# Patient Record
Sex: FEMALE | Race: WHITE | NOT HISPANIC OR LATINO | Employment: OTHER | URBAN - METROPOLITAN AREA
[De-identification: names, ages, dates, MRNs, and addresses within clinical notes are randomized per-mention and may not be internally consistent; named-entity substitution may affect disease eponyms.]

---

## 2017-02-21 ENCOUNTER — HOSPITAL ENCOUNTER (EMERGENCY)
Facility: HOSPITAL | Age: 32
Discharge: HOME | End: 2017-02-21
Payer: COMMERCIAL

## 2017-02-21 VITALS
BODY MASS INDEX: 29.45 KG/M2 | TEMPERATURE: 98.6 F | SYSTOLIC BLOOD PRESSURE: 127 MMHG | RESPIRATION RATE: 18 BRPM | DIASTOLIC BLOOD PRESSURE: 78 MMHG | HEART RATE: 73 BPM | OXYGEN SATURATION: 100 % | WEIGHT: 150 LBS | HEIGHT: 60 IN
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
## 2017-02-21 DIAGNOSIS — T25.021A: ICD-10-CM

## 2017-02-21 PROCEDURE — 99283 EMERGENCY DEPT VISIT LOW MDM: CPT

## 2017-02-21 RX ORDER — OXYCODONE HYDROCHLORIDE AND ACETAMINOPHEN 5; 325 MG/1; MG/1
1 TABLET ORAL EVERY 4 HOURS PRN
Qty: 15 TABLET | Refills: 0 | Status: SHIPPED | OUTPATIENT
Start: 2017-02-21 | End: 2017-03-03

## 2017-09-23 ENCOUNTER — HOSPITAL ENCOUNTER (EMERGENCY)
Facility: HOSPITAL | Age: 32
Discharge: HOME | End: 2017-09-23
Attending: EMERGENCY MEDICINE | Admitting: EMERGENCY MEDICINE
Payer: COMMERCIAL

## 2017-09-23 ENCOUNTER — APPOINTMENT (EMERGENCY)
Dept: ULTRASOUND IMAGING | Facility: HOSPITAL | Age: 32
End: 2017-09-23
Payer: COMMERCIAL

## 2017-09-23 VITALS
WEIGHT: 150 LBS | OXYGEN SATURATION: 99 % | HEART RATE: 75 BPM | HEIGHT: 60 IN | RESPIRATION RATE: 18 BRPM | TEMPERATURE: 97 F | BODY MASS INDEX: 29.45 KG/M2 | DIASTOLIC BLOOD PRESSURE: 67 MMHG | SYSTOLIC BLOOD PRESSURE: 112 MMHG

## 2017-09-23 DIAGNOSIS — R19.7 NAUSEA VOMITING AND DIARRHEA: ICD-10-CM

## 2017-09-23 DIAGNOSIS — R11.2 NAUSEA VOMITING AND DIARRHEA: ICD-10-CM

## 2017-09-23 DIAGNOSIS — R10.9 ABDOMINAL PAIN: Primary | ICD-10-CM

## 2017-09-23 LAB
ALBUMIN SERPL BCP-MCNC: 3.9 G/DL (ref 3.5–5)
ALP SERPL-CCNC: 86 U/L (ref 46–116)
ALT SERPL W P-5'-P-CCNC: 24 U/L (ref 12–78)
AMYLASE SERPL-CCNC: 38 IU/L (ref 25–115)
ANION GAP SERPL CALCULATED.3IONS-SCNC: 12 MMOL/L (ref 4–13)
AST SERPL W P-5'-P-CCNC: 12 U/L (ref 5–45)
BACTERIA UR QL AUTO: ABNORMAL /HPF
BASOPHILS # BLD AUTO: 0.02 THOUSANDS/ΜL (ref 0–0.1)
BASOPHILS NFR BLD AUTO: 0 % (ref 0–1)
BILIRUB DIRECT SERPL-MCNC: 0.16 MG/DL (ref 0–0.2)
BILIRUB SERPL-MCNC: 0.6 MG/DL (ref 0.2–1)
BILIRUB UR QL STRIP: NEGATIVE
BUN SERPL-MCNC: 11 MG/DL (ref 5–25)
CALCIUM SERPL-MCNC: 8.4 MG/DL (ref 8.3–10.1)
CHLORIDE SERPL-SCNC: 102 MMOL/L (ref 100–108)
CLARITY UR: CLEAR
CO2 SERPL-SCNC: 25 MMOL/L (ref 21–32)
COLOR UR: YELLOW
CREAT SERPL-MCNC: 0.77 MG/DL (ref 0.6–1.3)
EOSINOPHIL # BLD AUTO: 0.02 THOUSAND/ΜL (ref 0–0.61)
EOSINOPHIL NFR BLD AUTO: 0 % (ref 0–6)
ERYTHROCYTE [DISTWIDTH] IN BLOOD BY AUTOMATED COUNT: 13.1 % (ref 11.6–15.1)
EXT PREG TEST URINE: NEGATIVE
GFR SERPL CREATININE-BSD FRML MDRD: 103 ML/MIN/1.73SQ M
GLUCOSE SERPL-MCNC: 91 MG/DL (ref 65–140)
GLUCOSE UR STRIP-MCNC: NEGATIVE MG/DL
HCT VFR BLD AUTO: 42.8 % (ref 34.8–46.1)
HGB BLD-MCNC: 14.6 G/DL (ref 11.5–15.4)
HGB UR QL STRIP.AUTO: NEGATIVE
KETONES UR STRIP-MCNC: NEGATIVE MG/DL
LEUKOCYTE ESTERASE UR QL STRIP: NEGATIVE
LIPASE SERPL-CCNC: 152 U/L (ref 73–393)
LYMPHOCYTES # BLD AUTO: 1.6 THOUSANDS/ΜL (ref 0.6–4.47)
LYMPHOCYTES NFR BLD AUTO: 20 % (ref 14–44)
MCH RBC QN AUTO: 28.9 PG (ref 26.8–34.3)
MCHC RBC AUTO-ENTMCNC: 34.1 G/DL (ref 31.4–37.4)
MCV RBC AUTO: 85 FL (ref 82–98)
MONOCYTES # BLD AUTO: 0.42 THOUSAND/ΜL (ref 0.17–1.22)
MONOCYTES NFR BLD AUTO: 5 % (ref 4–12)
NEUTROPHILS # BLD AUTO: 6.12 THOUSANDS/ΜL (ref 1.85–7.62)
NEUTS SEG NFR BLD AUTO: 75 % (ref 43–75)
NITRITE UR QL STRIP: NEGATIVE
NON-SQ EPI CELLS URNS QL MICRO: ABNORMAL /HPF
NRBC BLD AUTO-RTO: 0 /100 WBCS
PH UR STRIP.AUTO: 6.5 [PH] (ref 4.5–8)
PLATELET # BLD AUTO: 341 THOUSANDS/UL (ref 149–390)
PMV BLD AUTO: 9.3 FL (ref 8.9–12.7)
POTASSIUM SERPL-SCNC: 3.7 MMOL/L (ref 3.5–5.3)
PROT SERPL-MCNC: 8 G/DL (ref 6.4–8.2)
PROT UR STRIP-MCNC: ABNORMAL MG/DL
RBC # BLD AUTO: 5.05 MILLION/UL (ref 3.81–5.12)
RBC #/AREA URNS AUTO: ABNORMAL /HPF
SODIUM SERPL-SCNC: 139 MMOL/L (ref 136–145)
SP GR UR STRIP.AUTO: 1.02 (ref 1–1.03)
UROBILINOGEN UR QL STRIP.AUTO: 1 E.U./DL
WBC # BLD AUTO: 8.21 THOUSAND/UL (ref 4.31–10.16)
WBC #/AREA URNS AUTO: ABNORMAL /HPF

## 2017-09-23 PROCEDURE — 83690 ASSAY OF LIPASE: CPT | Performed by: EMERGENCY MEDICINE

## 2017-09-23 PROCEDURE — 99284 EMERGENCY DEPT VISIT MOD MDM: CPT

## 2017-09-23 PROCEDURE — 80076 HEPATIC FUNCTION PANEL: CPT | Performed by: EMERGENCY MEDICINE

## 2017-09-23 PROCEDURE — 93005 ELECTROCARDIOGRAM TRACING: CPT | Performed by: EMERGENCY MEDICINE

## 2017-09-23 PROCEDURE — 36415 COLL VENOUS BLD VENIPUNCTURE: CPT

## 2017-09-23 PROCEDURE — 96374 THER/PROPH/DIAG INJ IV PUSH: CPT

## 2017-09-23 PROCEDURE — 76705 ECHO EXAM OF ABDOMEN: CPT

## 2017-09-23 PROCEDURE — 96375 TX/PRO/DX INJ NEW DRUG ADDON: CPT

## 2017-09-23 PROCEDURE — 85025 COMPLETE CBC W/AUTO DIFF WBC: CPT | Performed by: EMERGENCY MEDICINE

## 2017-09-23 PROCEDURE — 80048 BASIC METABOLIC PNL TOTAL CA: CPT | Performed by: EMERGENCY MEDICINE

## 2017-09-23 PROCEDURE — 81001 URINALYSIS AUTO W/SCOPE: CPT | Performed by: EMERGENCY MEDICINE

## 2017-09-23 PROCEDURE — 96361 HYDRATE IV INFUSION ADD-ON: CPT

## 2017-09-23 PROCEDURE — 81025 URINE PREGNANCY TEST: CPT | Performed by: EMERGENCY MEDICINE

## 2017-09-23 PROCEDURE — 82150 ASSAY OF AMYLASE: CPT | Performed by: EMERGENCY MEDICINE

## 2017-09-23 RX ORDER — SUCRALFATE 1 G/1
1 TABLET ORAL 4 TIMES DAILY
Qty: 28 TABLET | Refills: 0 | Status: SHIPPED | OUTPATIENT
Start: 2017-09-23 | End: 2021-06-21

## 2017-09-23 RX ORDER — SUCRALFATE 1 G/1
1 TABLET ORAL ONCE
Status: COMPLETED | OUTPATIENT
Start: 2017-09-23 | End: 2017-09-23

## 2017-09-23 RX ORDER — ONDANSETRON 2 MG/ML
4 INJECTION INTRAMUSCULAR; INTRAVENOUS ONCE
Status: COMPLETED | OUTPATIENT
Start: 2017-09-23 | End: 2017-09-23

## 2017-09-23 RX ORDER — OMEPRAZOLE 20 MG/1
40 CAPSULE, DELAYED RELEASE ORAL DAILY
Qty: 28 CAPSULE | Refills: 0 | Status: SHIPPED | OUTPATIENT
Start: 2017-09-23 | End: 2021-04-22

## 2017-09-23 RX ORDER — ONDANSETRON 4 MG/1
4 TABLET, ORALLY DISINTEGRATING ORAL EVERY 6 HOURS PRN
Qty: 12 TABLET | Refills: 0 | Status: SHIPPED | OUTPATIENT
Start: 2017-09-23 | End: 2021-10-31 | Stop reason: HOSPADM

## 2017-09-23 RX ORDER — MAGNESIUM HYDROXIDE/ALUMINUM HYDROXICE/SIMETHICONE 120; 1200; 1200 MG/30ML; MG/30ML; MG/30ML
30 SUSPENSION ORAL ONCE
Status: COMPLETED | OUTPATIENT
Start: 2017-09-23 | End: 2017-09-23

## 2017-09-23 RX ADMIN — ONDANSETRON 4 MG: 2 INJECTION INTRAMUSCULAR; INTRAVENOUS at 12:46

## 2017-09-23 RX ADMIN — SODIUM CHLORIDE 1000 ML: 0.9 INJECTION, SOLUTION INTRAVENOUS at 13:50

## 2017-09-23 RX ADMIN — ALUMINUM HYDROXIDE, MAGNESIUM HYDROXIDE, AND SIMETHICONE 30 ML: 200; 200; 20 SUSPENSION ORAL at 13:48

## 2017-09-23 RX ADMIN — FAMOTIDINE 20 MG: 10 INJECTION, SOLUTION INTRAVENOUS at 13:49

## 2017-09-23 RX ADMIN — LIDOCAINE HYDROCHLORIDE 15 ML: 20 SOLUTION ORAL; TOPICAL at 13:48

## 2017-09-23 RX ADMIN — SUCRALFATE 1 G: 1 TABLET ORAL at 15:45

## 2017-09-25 LAB
ATRIAL RATE: 63 BPM
P AXIS: 50 DEGREES
PR INTERVAL: 168 MS
QRS AXIS: -33 DEGREES
QRSD INTERVAL: 82 MS
QT INTERVAL: 408 MS
QTC INTERVAL: 417 MS
T WAVE AXIS: 8 DEGREES
VENTRICULAR RATE: 63 BPM

## 2018-10-11 ENCOUNTER — APPOINTMENT (EMERGENCY)
Dept: RADIOLOGY | Facility: HOSPITAL | Age: 33
End: 2018-10-11
Payer: COMMERCIAL

## 2018-10-11 ENCOUNTER — HOSPITAL ENCOUNTER (EMERGENCY)
Facility: HOSPITAL | Age: 33
Discharge: HOME | End: 2018-10-11
Attending: EMERGENCY MEDICINE | Admitting: EMERGENCY MEDICINE
Payer: COMMERCIAL

## 2018-10-11 VITALS
WEIGHT: 160 LBS | OXYGEN SATURATION: 98 % | HEIGHT: 60 IN | RESPIRATION RATE: 20 BRPM | DIASTOLIC BLOOD PRESSURE: 59 MMHG | BODY MASS INDEX: 31.41 KG/M2 | SYSTOLIC BLOOD PRESSURE: 96 MMHG | HEART RATE: 90 BPM | TEMPERATURE: 100.4 F

## 2018-10-11 DIAGNOSIS — B34.9 VIRAL ILLNESS: Primary | ICD-10-CM

## 2018-10-11 LAB
ANION GAP SERPL CALCULATED.3IONS-SCNC: 8 MMOL/L (ref 4–13)
BACTERIA UR QL AUTO: ABNORMAL /HPF
BASOPHILS # BLD AUTO: 0.01 THOUSANDS/ΜL (ref 0–0.1)
BASOPHILS NFR BLD AUTO: 0 % (ref 0–1)
BILIRUB UR QL STRIP: NEGATIVE
BUN SERPL-MCNC: 8 MG/DL (ref 5–25)
CALCIUM SERPL-MCNC: 8.4 MG/DL (ref 8.3–10.1)
CHLORIDE SERPL-SCNC: 98 MMOL/L (ref 100–108)
CLARITY UR: CLEAR
CO2 SERPL-SCNC: 24 MMOL/L (ref 21–32)
COLOR UR: YELLOW
CREAT SERPL-MCNC: 0.95 MG/DL (ref 0.6–1.3)
EOSINOPHIL # BLD AUTO: 0 THOUSAND/ΜL (ref 0–0.61)
EOSINOPHIL NFR BLD AUTO: 0 % (ref 0–6)
ERYTHROCYTE [DISTWIDTH] IN BLOOD BY AUTOMATED COUNT: 13.2 % (ref 11.6–15.1)
EXT PREG TEST URINE: NEGATIVE
GFR SERPL CREATININE-BSD FRML MDRD: 80 ML/MIN/1.73SQ M
GLUCOSE SERPL-MCNC: 120 MG/DL (ref 65–140)
GLUCOSE UR STRIP-MCNC: NEGATIVE MG/DL
HCT VFR BLD AUTO: 42.6 % (ref 34.8–46.1)
HGB BLD-MCNC: 14.7 G/DL (ref 11.5–15.4)
HGB UR QL STRIP.AUTO: ABNORMAL
IMM GRANULOCYTES # BLD AUTO: 0.07 THOUSAND/UL (ref 0–0.2)
IMM GRANULOCYTES NFR BLD AUTO: 1 % (ref 0–2)
KETONES UR STRIP-MCNC: ABNORMAL MG/DL
LEUKOCYTE ESTERASE UR QL STRIP: NEGATIVE
LIPASE SERPL-CCNC: 300 U/L (ref 73–393)
LYMPHOCYTES # BLD AUTO: 1.11 THOUSANDS/ΜL (ref 0.6–4.47)
LYMPHOCYTES NFR BLD AUTO: 14 % (ref 14–44)
MCH RBC QN AUTO: 29.5 PG (ref 26.8–34.3)
MCHC RBC AUTO-ENTMCNC: 34.5 G/DL (ref 31.4–37.4)
MCV RBC AUTO: 86 FL (ref 82–98)
MONOCYTES # BLD AUTO: 0.22 THOUSAND/ΜL (ref 0.17–1.22)
MONOCYTES NFR BLD AUTO: 3 % (ref 4–12)
MUCOUS THREADS UR QL AUTO: ABNORMAL
NEUTROPHILS # BLD AUTO: 6.8 THOUSANDS/ΜL (ref 1.85–7.62)
NEUTS SEG NFR BLD AUTO: 82 % (ref 43–75)
NITRITE UR QL STRIP: NEGATIVE
NON-SQ EPI CELLS URNS QL MICRO: ABNORMAL /HPF
NRBC BLD AUTO-RTO: 0 /100 WBCS
PH UR STRIP.AUTO: 6 [PH] (ref 4.5–8)
PLATELET # BLD AUTO: 290 THOUSANDS/UL (ref 149–390)
PMV BLD AUTO: 9 FL (ref 8.9–12.7)
POTASSIUM SERPL-SCNC: 3.6 MMOL/L (ref 3.5–5.3)
PROT UR STRIP-MCNC: ABNORMAL MG/DL
RBC # BLD AUTO: 4.98 MILLION/UL (ref 3.81–5.12)
RBC #/AREA URNS AUTO: ABNORMAL /HPF
SODIUM SERPL-SCNC: 130 MMOL/L (ref 136–145)
SP GR UR STRIP.AUTO: 1.02 (ref 1–1.03)
UROBILINOGEN UR QL STRIP.AUTO: 2 E.U./DL
WBC # BLD AUTO: 8.21 THOUSAND/UL (ref 4.31–10.16)
WBC #/AREA URNS AUTO: ABNORMAL /HPF

## 2018-10-11 PROCEDURE — 85025 COMPLETE CBC W/AUTO DIFF WBC: CPT | Performed by: EMERGENCY MEDICINE

## 2018-10-11 PROCEDURE — 71046 X-RAY EXAM CHEST 2 VIEWS: CPT

## 2018-10-11 PROCEDURE — 81025 URINE PREGNANCY TEST: CPT | Performed by: EMERGENCY MEDICINE

## 2018-10-11 PROCEDURE — 81001 URINALYSIS AUTO W/SCOPE: CPT | Performed by: EMERGENCY MEDICINE

## 2018-10-11 PROCEDURE — 83690 ASSAY OF LIPASE: CPT | Performed by: EMERGENCY MEDICINE

## 2018-10-11 PROCEDURE — 80048 BASIC METABOLIC PNL TOTAL CA: CPT | Performed by: EMERGENCY MEDICINE

## 2018-10-11 PROCEDURE — 99284 EMERGENCY DEPT VISIT MOD MDM: CPT

## 2018-10-11 PROCEDURE — 96374 THER/PROPH/DIAG INJ IV PUSH: CPT

## 2018-10-11 PROCEDURE — 96361 HYDRATE IV INFUSION ADD-ON: CPT

## 2018-10-11 PROCEDURE — 36415 COLL VENOUS BLD VENIPUNCTURE: CPT

## 2018-10-11 PROCEDURE — 96375 TX/PRO/DX INJ NEW DRUG ADDON: CPT

## 2018-10-11 RX ORDER — PSEUDOEPHEDRINE HCL 120 MG/1
120 TABLET, FILM COATED, EXTENDED RELEASE ORAL 2 TIMES DAILY
Qty: 15 TABLET | Refills: 0 | Status: SHIPPED | OUTPATIENT
Start: 2018-10-11 | End: 2021-06-21

## 2018-10-11 RX ORDER — NAPROXEN 500 MG/1
500 TABLET ORAL 2 TIMES DAILY WITH MEALS
Qty: 30 TABLET | Refills: 0 | Status: SHIPPED | OUTPATIENT
Start: 2018-10-11 | End: 2021-06-21

## 2018-10-11 RX ORDER — KETOROLAC TROMETHAMINE 30 MG/ML
15 INJECTION, SOLUTION INTRAMUSCULAR; INTRAVENOUS ONCE
Status: COMPLETED | OUTPATIENT
Start: 2018-10-11 | End: 2018-10-11

## 2018-10-11 RX ORDER — ONDANSETRON 2 MG/ML
4 INJECTION INTRAMUSCULAR; INTRAVENOUS ONCE
Status: COMPLETED | OUTPATIENT
Start: 2018-10-11 | End: 2018-10-11

## 2018-10-11 RX ORDER — ACETAMINOPHEN 325 MG/1
650 TABLET ORAL ONCE
Status: COMPLETED | OUTPATIENT
Start: 2018-10-11 | End: 2018-10-11

## 2018-10-11 RX ADMIN — ACETAMINOPHEN 650 MG: 325 TABLET, FILM COATED ORAL at 17:14

## 2018-10-11 RX ADMIN — ONDANSETRON 4 MG: 2 INJECTION INTRAMUSCULAR; INTRAVENOUS at 17:14

## 2018-10-11 RX ADMIN — KETOROLAC TROMETHAMINE 15 MG: 30 INJECTION, SOLUTION INTRAMUSCULAR at 17:17

## 2018-10-11 RX ADMIN — SODIUM CHLORIDE 1000 ML: 0.9 INJECTION, SOLUTION INTRAVENOUS at 17:14

## 2018-10-11 NOTE — ED PROVIDER NOTES
History  Chief Complaint   Patient presents with    Vomiting     started last night with vomiting and chills, with diarrhea     HPI    This is a 29 year female that presents today with fevers, chills, vomiting and diarrhea and body aches  Patient states for past 2 days she has been having symptoms  States initially started with chills  Denies any abdominal pain  No urinary complaints  States some mild cough  Mild headache  States today started with congestion  No sick contacts at home  Patient states she has no other medical problems  Healthy at baseline  She has not taken anything for symptoms at home  Denies any Tylenol or ibuprofen  States generalized weakness  No rashes or tick bites  No travel outside the country  29 year female that presents today with fevers  On exam no tenderness of the abdomen  Lungs clear to auscultation  No neck stiffness  Patient is alert and oriented x3  Will get labs  Symptomatic treatment reassess patient most likely viral syndrome  Prior to Admission Medications   Prescriptions Last Dose Informant Patient Reported? Taking?   omeprazole (PriLOSEC) 20 mg delayed release capsule   No No   Sig: Take 2 capsules by mouth daily for 14 days   ondansetron (ZOFRAN-ODT) 4 mg disintegrating tablet   No No   Sig: Take 1 tablet by mouth every 6 (six) hours as needed for nausea or vomiting for up to 3 days   sucralfate (CARAFATE) 1 g tablet   No No   Sig: Take 1 tablet by mouth 4 (four) times a day for 7 days      Facility-Administered Medications: None       History reviewed  No pertinent past medical history  Past Surgical History:   Procedure Laterality Date     SECTION         History reviewed  No pertinent family history  I have reviewed and agree with the history as documented      Social History   Substance Use Topics    Smoking status: Never Smoker    Smokeless tobacco: Never Used    Alcohol use No        Review of Systems   Constitutional: Positive for chills, fatigue and fever  Negative for diaphoresis  HENT: Negative  Respiratory: Negative  Negative for cough, shortness of breath and wheezing  Cardiovascular: Negative  Negative for chest pain, palpitations and leg swelling  Gastrointestinal: Positive for diarrhea, nausea and vomiting  Negative for abdominal distention and abdominal pain  Genitourinary: Negative  Musculoskeletal: Negative  Negative for back pain, neck pain and neck stiffness  Skin: Negative  Neurological: Positive for weakness and headaches  Psychiatric/Behavioral: Negative  All other systems reviewed and are negative  Physical Exam  Physical Exam   Constitutional: She is oriented to person, place, and time  She appears well-developed and well-nourished  No distress  HENT:   Head: Normocephalic and atraumatic  Eyes: Pupils are equal, round, and reactive to light  EOM are normal    Neck: Normal range of motion  Neck supple  Cardiovascular: Normal rate, regular rhythm and normal heart sounds  No murmur heard  Pulmonary/Chest: Effort normal and breath sounds normal    Abdominal: Soft  Bowel sounds are normal  She exhibits no distension  There is no tenderness  There is no rebound and no guarding  Musculoskeletal: Normal range of motion  She exhibits no edema or deformity  Neurological: She is alert and oriented to person, place, and time  Skin: Skin is warm  Capillary refill takes less than 2 seconds  No rash noted  She is not diaphoretic  Psychiatric: She has a normal mood and affect  Vitals reviewed        Vital Signs  ED Triage Vitals   Temperature Pulse Respirations Blood Pressure SpO2   10/11/18 1513 10/11/18 1513 10/11/18 1513 10/11/18 1513 10/11/18 1513   (!) 102 5 °F (39 2 °C) (!) 116 18 127/74 96 %      Temp Source Heart Rate Source Patient Position - Orthostatic VS BP Location FiO2 (%)   10/11/18 1606 10/11/18 1606 10/11/18 1826 10/11/18 1606 --   Axillary Monitor Lying Right arm       Pain Score       10/11/18 1513       9           Vitals:    10/11/18 1513 10/11/18 1606 10/11/18 1826   BP: 127/74 139/62 96/59   Pulse: (!) 116 (!) 119 90   Patient Position - Orthostatic VS:   Lying       Visual Acuity      ED Medications  Medications   ondansetron (ZOFRAN) injection 4 mg (4 mg Intravenous Given 10/11/18 1714)   acetaminophen (TYLENOL) tablet 650 mg (650 mg Oral Given 10/11/18 1714)   sodium chloride 0 9 % bolus 1,000 mL (0 mL Intravenous Stopped 10/11/18 1858)   ketorolac (TORADOL) injection 15 mg (15 mg Intravenous Given 10/11/18 1717)       Diagnostic Studies  Results Reviewed     Procedure Component Value Units Date/Time    Urine Microscopic [[id number]]  (Abnormal) Collected:  10/11/18 1719    Lab Status:  Final result Specimen:  Urine from Urine, Clean Catch Updated:  10/11/18 1741     RBC, UA 2-4 (A) /hpf      WBC, UA None Seen /hpf      Epithelial Cells Occasional /hpf      Bacteria, UA Occasional /hpf      MUCOUS THREADS Occasional (A)    UA w Reflex to Microscopic [[id number]]  (Abnormal) Collected:  10/11/18 1719    Lab Status:  Final result Specimen:  Urine from Urine, Clean Catch Updated:  10/11/18 1729     Color, UA Yellow     Clarity, UA Clear     Specific Gravity, UA 1 025     pH, UA 6 0     Leukocytes, UA Negative     Nitrite, UA Negative     Protein, UA Trace (A) mg/dl      Glucose, UA Negative mg/dl      Ketones, UA Trace (A) mg/dl      Urobilinogen, UA 2 0 (A) E U /dl      Bilirubin, UA Negative     Blood, UA Trace-Intact (A)    POCT pregnancy, urine [[id number]]  (Normal) Resulted:  10/11/18 1710    Lab Status:  Final result Updated:  10/11/18 1719     EXT PREG TEST UR (Ref: Negative) negative    Basic metabolic panel [[id number]]  (Abnormal) Collected:  10/11/18 1605    Lab Status:  Final result Specimen:  Blood from Arm, Left Updated:  10/11/18 1630     Sodium 130 (L) mmol/L      Potassium 3 6 mmol/L      Chloride 98 (L) mmol/L      CO2 24 mmol/L      ANION GAP 8 mmol/L BUN 8 mg/dL      Creatinine 0 95 mg/dL      Glucose 120 mg/dL      Calcium 8 4 mg/dL      eGFR 80 ml/min/1 73sq m     Narrative:         National Kidney Disease Education Program recommendations are as follows:  GFR calculation is accurate only with a steady state creatinine  Chronic Kidney disease less than 60 ml/min/1 73 sq  meters  Kidney failure less than 15 ml/min/1 73 sq  meters  Lipase [[id number]]  (Normal) Collected:  10/11/18 1605    Lab Status:  Final result Specimen:  Blood from Arm, Left Updated:  10/11/18 1630     Lipase 300 u/L     CBC and differential [[id number]]  (Abnormal) Collected:  10/11/18 1605    Lab Status:  Final result Specimen:  Blood from Arm, Left Updated:  10/11/18 1617     WBC 8 21 Thousand/uL      RBC 4 98 Million/uL      Hemoglobin 14 7 g/dL      Hematocrit 42 6 %      MCV 86 fL      MCH 29 5 pg      MCHC 34 5 g/dL      RDW 13 2 %      MPV 9 0 fL      Platelets 726 Thousands/uL      nRBC 0 /100 WBCs      Neutrophils Relative 82 (H) %      Immat GRANS % 1 %      Lymphocytes Relative 14 %      Monocytes Relative 3 (L) %      Eosinophils Relative 0 %      Basophils Relative 0 %      Neutrophils Absolute 6 80 Thousands/µL      Immature Grans Absolute 0 07 Thousand/uL      Lymphocytes Absolute 1 11 Thousands/µL      Monocytes Absolute 0 22 Thousand/µL      Eosinophils Absolute 0 00 Thousand/µL      Basophils Absolute 0 01 Thousands/µL                  XR chest 2 views   ED Interpretation by Lizabeth Dao MD (10/11 1746)   Normal cxr       Final Result by Jj Sawyer DO (10/11 2004)      No acute cardiopulmonary disease              Workstation performed: [id number]                    Procedures  Procedures       Phone Contacts  ED Phone Contact    ED Course                               MDM  CritCare Time    Disposition  Final diagnoses:   Viral illness     Time reflects when diagnosis was documented in both MDM as applicable and the Disposition within this note     Time User Action Codes Description Comment    10/11/2018  5:50 PM Karen Junior Box [B34 9] Viral illness       ED Disposition     ED Disposition Condition Comment    Discharge  Tamala Gosselin discharge to home/self care  Condition at discharge: stable      Follow-up Information     Follow up With Specialties Details Why Contact Gabo Mendez MD  Schedule an appointment as soon as possible for a visit  [address] [place] [address]  [phone]            Discharge Medication List as of 10/11/2018  5:51 PM      START taking these medications    Details   naproxen (NAPROSYN) 500 mg tablet Take 1 tablet (500 mg total) by mouth 2 (two) times a day with meals, Starting u 10/11/2018, Print      pseudoephedrine (SUDAFED) 120 MG 12 hr tablet Take 1 tablet (120 mg total) by mouth 2 (two) times a day, Starting u 10/11/2018, Print         CONTINUE these medications which have NOT CHANGED    Details   omeprazole (PriLOSEC) 20 mg delayed release capsule Take 2 capsules by mouth daily for 14 days, Starting Sat 9/23/2017, Until Sat 10/7/2017, Print      ondansetron (ZOFRAN-ODT) 4 mg disintegrating tablet Take 1 tablet by mouth every 6 (six) hours as needed for nausea or vomiting for up to 3 days, Starting Sat 9/23/2017, Until Tue 9/26/2017, Print      sucralfate (CARAFATE) 1 g tablet Take 1 tablet by mouth 4 (four) times a day for 7 days, Starting Sat 9/23/2017, Until Sat 9/30/2017, Print           No discharge procedures on file      ED Provider  Electronically Signed by           Abdirashid Davison MD  10/11/18 6251

## 2018-10-11 NOTE — DISCHARGE INSTRUCTIONS
If any worsening of symptoms or new symptoms please return emergency department        Viral Syndrome   WHAT YOU NEED TO KNOW:   Viral syndrome is a term used for a viral infection that has no clear cause  Viruses are spread easily from person to person through the air and on shared items  DISCHARGE INSTRUCTIONS:   Call 911 for the following:   · You have a seizure  · You cannot be woken  · You have chest pain or trouble breathing  Return to the emergency department if:   · You have a stiff neck, a bad headache, and sensitivity to light  · You feel weak, dizzy, or confused  · You stop urinating or urinate a lot less than normal      · You cough up blood or thick, yellow or green, mucus  · You have severe abdominal pain or your abdomen is larger than usual   Contact your healthcare provider if:   · Your symptoms do not get better with treatment, or get worse, after 3 days  · You have a rash or ear pain  · You have burning when you urinate  · You have questions or concerns about your condition or care  Medicines: You may  need any of the following:  · Acetaminophen  decreases pain and fever  It is available without a doctor's order  Ask how much medicine to take and how often to take it  Follow directions  Acetaminophen can cause liver damage if not taken correctly  · NSAIDs , such as ibuprofen, help decrease swelling, pain, and fever  NSAIDs can cause stomach bleeding or kidney problems in certain people  If you take blood thinner medicine, always ask your healthcare provider if NSAIDs are safe for you  Always read the medicine label and follow directions  · Cold medicine  helps decrease swelling, control a cough, and relieve chest or nasal congestion  · Saline nasal spray  helps decrease nasal congestion  · Take your medicine as directed  Contact your healthcare provider if you think your medicine is not helping or if you have side effects   Tell him of her if you are allergic to any medicine  Keep a list of the medicines, vitamins, and herbs you take  Include the amounts, and when and why you take them  Bring the list or the pill bottles to follow-up visits  Carry your medicine list with you in case of an emergency  Manage your symptoms:   · Drink liquids as directed  to prevent dehydration  Ask how much liquid to drink each day and which liquids are best for you  Ask if you should drink an oral rehydration solution (ORS)  An ORS has the right amounts of water, salts, and sugar you need to replace body fluids  This may help prevent dehydration caused by vomiting or diarrhea  Do not drink liquids with caffeine  Drinks with caffeine can make dehydration worse  · Get plenty of rest  to help your body heal  Take naps throughout the day  Ask your healthcare provider when you can return to work and your normal activities  · Use a cool mist humidifier  to help you breathe easier if you have nasal or chest congestion  Ask your healthcare provider how to use a cool mist humidifier  · Eat honey or use cough drops  to help decrease throat discomfort  Ask your healthcare provider how much honey you should eat each day  Cough drops are available without a doctor's order  Follow directions for taking cough drops  · Do not smoke and stay away from others who smoke  Nicotine and other chemicals in cigarettes and cigars can cause lung damage  Smoking can also delay healing  Ask your healthcare provider for information if you currently smoke and need help to quit  E-cigarettes or smokeless tobacco still contain nicotine  Talk to your healthcare provider before you use these products  · Wash your hands frequently  to prevent the spread of germs to others  Use soap and water  Use gel hand  when soap and water are not available  Wash your hands after you use the bathroom, cough, or sneeze  Wash your hands before you prepare or eat food    Follow up with your healthcare provider as directed:  Write down your questions so you remember to ask them during your visits  © 2017 [employer] Information is for End User's use only and may not be sold, redistributed or otherwise used for commercial purposes  All illustrations and images included in CareNotes® are the copyrighted property of A [employer] M [employer]  or [employer]  The above information is an  only  It is not intended as medical advice for individual conditions or treatments  Talk to your doctor, nurse or pharmacist before following any medical regimen to see if it is safe and effective for you

## 2018-10-14 ENCOUNTER — APPOINTMENT (EMERGENCY)
Dept: CT IMAGING | Facility: HOSPITAL | Age: 33
DRG: 263 | End: 2018-10-14
Payer: COMMERCIAL

## 2018-10-14 ENCOUNTER — APPOINTMENT (OUTPATIENT)
Dept: ULTRASOUND IMAGING | Facility: HOSPITAL | Age: 33
DRG: 263 | End: 2018-10-14
Payer: COMMERCIAL

## 2018-10-14 ENCOUNTER — HOSPITAL ENCOUNTER (INPATIENT)
Facility: HOSPITAL | Age: 33
LOS: 5 days | Discharge: HOME | DRG: 263 | End: 2018-10-20
Attending: EMERGENCY MEDICINE | Admitting: SURGERY
Payer: COMMERCIAL

## 2018-10-14 DIAGNOSIS — R79.89 ABNORMAL LFTS (LIVER FUNCTION TESTS): ICD-10-CM

## 2018-10-14 DIAGNOSIS — K81.9 CHOLECYSTITIS: Primary | ICD-10-CM

## 2018-10-14 DIAGNOSIS — K81.0 ACUTE CHOLECYSTITIS: ICD-10-CM

## 2018-10-14 PROBLEM — F17.200 SMOKER: Chronic | Status: ACTIVE | Noted: 2018-10-14

## 2018-10-14 LAB
ALBUMIN SERPL BCP-MCNC: 3.2 G/DL (ref 3.5–5)
ALP SERPL-CCNC: 212 U/L (ref 46–116)
ALT SERPL W P-5'-P-CCNC: 191 U/L (ref 12–78)
ANION GAP SERPL CALCULATED.3IONS-SCNC: 12 MMOL/L (ref 4–13)
AST SERPL W P-5'-P-CCNC: 168 U/L (ref 5–45)
BACTERIA UR QL AUTO: ABNORMAL /HPF
BASOPHILS # BLD MANUAL: 0 THOUSAND/UL (ref 0–0.1)
BASOPHILS NFR MAR MANUAL: 0 % (ref 0–1)
BILIRUB DIRECT SERPL-MCNC: 2.36 MG/DL (ref 0–0.2)
BILIRUB SERPL-MCNC: 2.9 MG/DL (ref 0.2–1)
BILIRUB UR QL STRIP: ABNORMAL
BUN SERPL-MCNC: 12 MG/DL (ref 5–25)
CALCIUM SERPL-MCNC: 8.4 MG/DL (ref 8.3–10.1)
CHLORIDE SERPL-SCNC: 97 MMOL/L (ref 100–108)
CLARITY UR: CLEAR
CO2 SERPL-SCNC: 24 MMOL/L (ref 21–32)
COLOR UR: YELLOW
CREAT SERPL-MCNC: 1.08 MG/DL (ref 0.6–1.3)
EOSINOPHIL # BLD MANUAL: 0 THOUSAND/UL (ref 0–0.4)
EOSINOPHIL NFR BLD MANUAL: 0 % (ref 0–6)
ERYTHROCYTE [DISTWIDTH] IN BLOOD BY AUTOMATED COUNT: 13.5 % (ref 11.6–15.1)
EXT PREG TEST URINE: NEGATIVE
GFR SERPL CREATININE-BSD FRML MDRD: 68 ML/MIN/1.73SQ M
GLUCOSE SERPL-MCNC: 172 MG/DL (ref 65–140)
GLUCOSE UR STRIP-MCNC: NEGATIVE MG/DL
HCT VFR BLD AUTO: 40.7 % (ref 34.8–46.1)
HGB BLD-MCNC: 14.4 G/DL (ref 11.5–15.4)
HGB UR QL STRIP.AUTO: NEGATIVE
KETONES UR STRIP-MCNC: ABNORMAL MG/DL
LEUKOCYTE ESTERASE UR QL STRIP: NEGATIVE
LIPASE SERPL-CCNC: 145 U/L (ref 73–393)
LYMPHOCYTES # BLD AUTO: 0.26 THOUSAND/UL (ref 0.6–4.47)
LYMPHOCYTES # BLD AUTO: 5 % (ref 14–44)
MCH RBC QN AUTO: 29.3 PG (ref 26.8–34.3)
MCHC RBC AUTO-ENTMCNC: 35.4 G/DL (ref 31.4–37.4)
MCV RBC AUTO: 83 FL (ref 82–98)
METAMYELOCYTES NFR BLD MANUAL: 1 % (ref 0–1)
MONOCYTES # BLD AUTO: 0.1 THOUSAND/UL (ref 0–1.22)
MONOCYTES NFR BLD: 2 % (ref 4–12)
MYELOCYTES NFR BLD MANUAL: 2 % (ref 0–1)
NEUTROPHILS # BLD MANUAL: 4.66 THOUSAND/UL (ref 1.85–7.62)
NEUTS BAND NFR BLD MANUAL: 12 % (ref 0–8)
NEUTS SEG NFR BLD AUTO: 78 % (ref 43–75)
NITRITE UR QL STRIP: NEGATIVE
NON-SQ EPI CELLS URNS QL MICRO: ABNORMAL /HPF
NRBC BLD AUTO-RTO: 0 /100 WBCS
PH UR STRIP.AUTO: 5.5 [PH] (ref 4.5–8)
PLATELET # BLD AUTO: 165 THOUSANDS/UL (ref 149–390)
PLATELET BLD QL SMEAR: ADEQUATE
PMV BLD AUTO: 9.8 FL (ref 8.9–12.7)
POTASSIUM SERPL-SCNC: 3.5 MMOL/L (ref 3.5–5.3)
PROT SERPL-MCNC: 6.5 G/DL (ref 6.4–8.2)
PROT UR STRIP-MCNC: ABNORMAL MG/DL
RBC # BLD AUTO: 4.91 MILLION/UL (ref 3.81–5.12)
RBC #/AREA URNS AUTO: ABNORMAL /HPF
RBC MORPH BLD: NORMAL
S PYO AG THROAT QL: NEGATIVE
SODIUM SERPL-SCNC: 133 MMOL/L (ref 136–145)
SP GR UR STRIP.AUTO: 1.01 (ref 1–1.03)
TOTAL CELLS COUNTED SPEC: 100
UROBILINOGEN UR QL STRIP.AUTO: 1 E.U./DL
WBC # BLD AUTO: 5.18 THOUSAND/UL (ref 4.31–10.16)
WBC #/AREA URNS AUTO: ABNORMAL /HPF

## 2018-10-14 PROCEDURE — 80076 HEPATIC FUNCTION PANEL: CPT | Performed by: EMERGENCY MEDICINE

## 2018-10-14 PROCEDURE — 74177 CT ABD & PELVIS W/CONTRAST: CPT

## 2018-10-14 PROCEDURE — 99223 1ST HOSP IP/OBS HIGH 75: CPT | Performed by: SURGERY

## 2018-10-14 PROCEDURE — 76705 ECHO EXAM OF ABDOMEN: CPT

## 2018-10-14 PROCEDURE — 85027 COMPLETE CBC AUTOMATED: CPT | Performed by: EMERGENCY MEDICINE

## 2018-10-14 PROCEDURE — 81025 URINE PREGNANCY TEST: CPT | Performed by: EMERGENCY MEDICINE

## 2018-10-14 PROCEDURE — 87040 BLOOD CULTURE FOR BACTERIA: CPT | Performed by: SURGERY

## 2018-10-14 PROCEDURE — 81001 URINALYSIS AUTO W/SCOPE: CPT | Performed by: EMERGENCY MEDICINE

## 2018-10-14 PROCEDURE — 96374 THER/PROPH/DIAG INJ IV PUSH: CPT

## 2018-10-14 PROCEDURE — 36415 COLL VENOUS BLD VENIPUNCTURE: CPT | Performed by: EMERGENCY MEDICINE

## 2018-10-14 PROCEDURE — 87430 STREP A AG IA: CPT | Performed by: SURGERY

## 2018-10-14 PROCEDURE — 80048 BASIC METABOLIC PNL TOTAL CA: CPT | Performed by: EMERGENCY MEDICINE

## 2018-10-14 PROCEDURE — 99285 EMERGENCY DEPT VISIT HI MDM: CPT

## 2018-10-14 PROCEDURE — 83690 ASSAY OF LIPASE: CPT | Performed by: EMERGENCY MEDICINE

## 2018-10-14 PROCEDURE — 96375 TX/PRO/DX INJ NEW DRUG ADDON: CPT

## 2018-10-14 PROCEDURE — 87631 RESP VIRUS 3-5 TARGETS: CPT | Performed by: SURGERY

## 2018-10-14 PROCEDURE — 87070 CULTURE OTHR SPECIMN AEROBIC: CPT | Performed by: SURGERY

## 2018-10-14 PROCEDURE — 96361 HYDRATE IV INFUSION ADD-ON: CPT

## 2018-10-14 PROCEDURE — 85007 BL SMEAR W/DIFF WBC COUNT: CPT | Performed by: EMERGENCY MEDICINE

## 2018-10-14 RX ORDER — ONDANSETRON 2 MG/ML
4 INJECTION INTRAMUSCULAR; INTRAVENOUS EVERY 6 HOURS PRN
Status: DISCONTINUED | OUTPATIENT
Start: 2018-10-14 | End: 2018-10-20 | Stop reason: HOSPADM

## 2018-10-14 RX ORDER — DEXTROSE, SODIUM CHLORIDE, AND POTASSIUM CHLORIDE 5; .45; .15 G/100ML; G/100ML; G/100ML
75 INJECTION INTRAVENOUS CONTINUOUS
Status: DISCONTINUED | OUTPATIENT
Start: 2018-10-14 | End: 2018-10-17

## 2018-10-14 RX ORDER — MORPHINE SULFATE 10 MG/ML
6 INJECTION, SOLUTION INTRAMUSCULAR; INTRAVENOUS ONCE
Status: COMPLETED | OUTPATIENT
Start: 2018-10-14 | End: 2018-10-14

## 2018-10-14 RX ORDER — ONDANSETRON 2 MG/ML
4 INJECTION INTRAMUSCULAR; INTRAVENOUS ONCE
Status: COMPLETED | OUTPATIENT
Start: 2018-10-14 | End: 2018-10-14

## 2018-10-14 RX ORDER — ACETAMINOPHEN 325 MG/1
650 TABLET ORAL EVERY 6 HOURS PRN
Status: DISCONTINUED | OUTPATIENT
Start: 2018-10-14 | End: 2018-10-20 | Stop reason: HOSPADM

## 2018-10-14 RX ORDER — KETOROLAC TROMETHAMINE 30 MG/ML
15 INJECTION, SOLUTION INTRAMUSCULAR; INTRAVENOUS ONCE
Status: COMPLETED | OUTPATIENT
Start: 2018-10-14 | End: 2018-10-14

## 2018-10-14 RX ORDER — MORPHINE SULFATE 4 MG/ML
4 INJECTION, SOLUTION INTRAMUSCULAR; INTRAVENOUS ONCE
Status: DISCONTINUED | OUTPATIENT
Start: 2018-10-14 | End: 2018-10-14

## 2018-10-14 RX ADMIN — MORPHINE SULFATE 2 MG: 2 INJECTION, SOLUTION INTRAMUSCULAR; INTRAVENOUS at 22:30

## 2018-10-14 RX ADMIN — CEFAZOLIN SODIUM 2000 MG: 2 SOLUTION INTRAVENOUS at 08:13

## 2018-10-14 RX ADMIN — ONDANSETRON 4 MG: 2 INJECTION INTRAMUSCULAR; INTRAVENOUS at 05:24

## 2018-10-14 RX ADMIN — DEXTROSE, SODIUM CHLORIDE, AND POTASSIUM CHLORIDE 125 ML/HR: 5; .45; .15 INJECTION INTRAVENOUS at 08:43

## 2018-10-14 RX ADMIN — ENOXAPARIN SODIUM 40 MG: 40 INJECTION SUBCUTANEOUS at 08:13

## 2018-10-14 RX ADMIN — KETOROLAC TROMETHAMINE 15 MG: 30 INJECTION, SOLUTION INTRAMUSCULAR at 05:23

## 2018-10-14 RX ADMIN — IOHEXOL 100 ML: 350 INJECTION, SOLUTION INTRAVENOUS at 06:29

## 2018-10-14 RX ADMIN — ACETAMINOPHEN 650 MG: 325 TABLET, FILM COATED ORAL at 15:50

## 2018-10-14 RX ADMIN — FAMOTIDINE 20 MG: 10 INJECTION, SOLUTION INTRAVENOUS at 19:00

## 2018-10-14 RX ADMIN — MORPHINE SULFATE 6 MG: 10 INJECTION INTRAVENOUS at 05:24

## 2018-10-14 RX ADMIN — MORPHINE SULFATE 2 MG: 2 INJECTION, SOLUTION INTRAMUSCULAR; INTRAVENOUS at 14:11

## 2018-10-14 RX ADMIN — SODIUM CHLORIDE 1000 ML: 0.9 INJECTION, SOLUTION INTRAVENOUS at 05:25

## 2018-10-14 RX ADMIN — ONDANSETRON 4 MG: 2 INJECTION INTRAMUSCULAR; INTRAVENOUS at 22:44

## 2018-10-14 RX ADMIN — FAMOTIDINE 20 MG: 10 INJECTION, SOLUTION INTRAVENOUS at 08:13

## 2018-10-14 NOTE — UTILIZATION REVIEW
Initial Clinical Review    Admission: Date/Time/Statement: 10/14/18 @ 0714 OBSERVATION    Orders Placed This Encounter   Procedures    Place in Observation (expected length of stay for this patient is less than two midnights)     Standing Status:   Standing     Number of Occurrences:   1     Order Specific Question:   Admitting Physician     Answer:   Xochilt Luo     Order Specific Question:   Level of Care     Answer:   Med Surg [16]         ED: Date/Time/Mode of Arrival:   ED Arrival Information     Expected Arrival Acuity Means of Arrival Escorted By Service Admission Type    - 10/14/2018 04:51 Urgent [facility]-General Urgent    Arrival Complaint    ABDOMINAL PAIN          Chief Complaint:   Chief Complaint   Patient presents with    Abdominal Pain     Pt c/o epigastric pain that started since being dx with viral infection  History of Illness:      HPI:  Yariel Becerril is a 29 y o  female who presents with severe epigastric and right upper quadrant abdominal pain since last night  The patient was in the emergency room a couple days ago complaining of malaise and body aches with fevers up to 103  The patient was treated for viral infection subsequently discharged from the emergency room  Then the patient presented early this morning complaining of severe epigastric and right upper quadrant associated with nausea without vomiting, chills and fever, dark urine for several days  She does not recall any single event the provoked the symptoms        ED Vital Signs:   ED Triage Vitals   Temperature Pulse Respirations Blood Pressure SpO2   10/14/18 0459 10/14/18 0457 10/14/18 0457 10/14/18 0457 10/14/18 0457   97 6 °F (36 4 °C) 105 19 131/78 100 %   Pain Score       10/14/18 0457       Worst Possible Pain        Wt Readings from Last 1 Encounters:   10/11/18 72 6 kg (160 lb)       Vital Signs:  WNL    Abnormal Labs/Diagnostic Test Results:     CT abdomen and pelvis:     No radiopaque gallstones  Mild gallbladder wall hyperemia without thickening likely reactive  Follow-up with gallbladder sonogram if clinically warranted  Minimal free fluid in the cul-de-sac and pooling within the gallbladder fossa  Fluid within physiologic limits  This may be sequela of leaking or ruptured right ovarian 3 cm dominant follicle  Nonobstructive bowel pattern suggestive of mild gastroenteritis  No bowel obstruction  Otherwise, no acute intra-abdominal or intrapelvic process          10/14/18 0515    WBC 4 31 - 10 16 Thousand/uL 5 18      10/14/18 0515     Segmented % 43 - 75 % 78     Bands % 0 - 8 % 12     Absolute Neutrophils 1 85 - 7 62 Thousand/uL 4 66    Lymphocytes Absolute 0 60 - 4 47 Thousand/uL 0 26           10/14/18 0515    Sodium 136 - 145 mmol/L 133     Potassium 3 5 - 5 3 mmol/L 3 5    Chloride 100 - 108 mmol/L 97     Glucose 65 - 140 mg/dL 172     Calcium 8 3 - 10 1 mg/dL 8 4         10/14/18 0515     Total Bilirubin 0 20 - 1 00 mg/dL 2 90     Bilirubin, Direct 0 00 - 0 20 mg/dL 2 36     Alkaline Phosphatase 46 - 116 U/L 212     AST 5 - 45 U/L 168     ALT 12 - 78 U/L 191       ED Treatment:   Medication Administration from 10/14/2018 0451 to 10/14/2018 1019       Date/Time Order Dose Route Action Comments     10/14/2018 0524 morphine (PF) 10 mg/mL injection 6 mg 6 mg Intravenous Given      10/14/2018 0524 ondansetron (ZOFRAN) injection 4 mg 4 mg Intravenous Given      10/14/2018 0523 ketorolac (TORADOL) injection 15 mg 15 mg Intravenous Given      10/14/2018 0525 sodium chloride 0 9 % bolus 1,000 mL 1,000 mL Intravenous New Bag      10/14/2018 0629 iohexol (OMNIPAQUE) 350 MG/ML injection (MULTI-DOSE) 100 mL 100 mL Intravenous Given         Past Medical/Surgical History:     No Additional Past Medical History       Admitting Diagnosis: Cholecystitis [K81 9]  Abdominal pain [R10 9]    Age/Sex: 29 y o  female    Assessment/Plan:     Assessment:  Acute cholecystitis with elevated liver function test  Suspect cholangitis     Plan:  The patient will be admitted to the hospital, IV fluids, IV antibiotics, ultrasound of the gallbladder and common bile duct, and if this is positive for common bile duct stone then the patient will need ERCP  If ultrasound failed to revealed common bile duct stones, MRI will be recommended  She will also will require laparoscopic cholecystectomy possible open before discharge      Admission Orders: NPO, Ultrasound RUQ abdomen, MRI/MRCP, activity as tolerated, sequential compression device  Scheduled Meds:   Current Facility-Administered Medications:  enoxaparin 40 mg Subcutaneous Daily   famotidine 20 mg Intravenous BID   morphine injection 2 mg Intravenous Q1H PRN   ondansetron 4 mg Intravenous Q6H PRN     Continuous Infusions:   dextrose 5 % and sodium chloride 0 45 % with KCl 20 mEq/L 125 mL/hr Last Rate: 125 mL/hr (10/14/18 0843)     ===============================================================  Continued Stay Review    Date: 10/14 @ 1630    Vital Signs: BP 94/60 (BP Location: Left arm)   Pulse (!) 115   Temp (!) 103 1 °F (39 5 °C) (Oral)   Resp 18   SpO2 97%     Medications:   Scheduled Meds:   Current Facility-Administered Medications:  acetaminophen 650 mg Oral Q6H PRN   enoxaparin 40 mg Subcutaneous Daily   famotidine 20 mg Intravenous BID   morphine injection 2 mg Intravenous Q1H PRN x 1 @ 1411   ondansetron 4 mg Intravenous Q6H PRN      Continuous Infusions:   dextrose 5 % and sodium chloride 0 45 % with KCl 20 mEq/L 125 mL/hr Last Rate: 125 mL/hr (10/14/18 0843)     Abnormal Labs/Diagnostic Results:     Ultrasound Right Upper Quadrant:     Marked gallbladder wall thickening with no shadowing stones or biliary dilatation  Age/Sex: 29 y o  female       Discharge Plan: TBD                Thank you,  [address]  Utilization Review Department  Phone: [phone];  Fax [phone]  ATTENTION: Please call with any questions or concerns to [phone]  and carefully follow the prompts so that you are directed to the right person  Send all requests for admission clinical reviews, approved or denied determinations and any other requests to fax [phone]   All voicemails are confidential

## 2018-10-14 NOTE — ED NOTES
Patient transported to [facility], [address] [place] [address]  10/14/18 7660

## 2018-10-14 NOTE — H&P
H&P Exam - General Surgery   Kemi Blanco 29 y o  female MRN: [id number]  Unit/Bed#: -01 Encounter: [id number]    Assessment/Plan     Assessment:  Acute cholecystitis with elevated liver function test  Suspect cholangitis    Plan:  The patient will be admitted to the hospital, IV fluids, IV antibiotics, ultrasound of the gallbladder and common bile duct, and this is positive for common bile duct stone then the patient will need ERCP  If ultrasound failed to revealed common bile duct stones, MRI will be recommended  She will also will require laparoscopic cholecystectomy possible open before discharge  History of Present Illness     HPI:  Kemi Blanco is a 29 y o  female who presents with severe epigastric and right upper quadrant abdominal pain since last night  The patient was in the emergency room a couple days ago complaining of malaise and body aches with fevers up to 103  The patient was treated for viral infection subsequently discharged from the emergency room  Then the patient presented early this morning complaining of severe epigastric and right upper quadrant associated with nausea without vomiting, chills and fever, dark urine for several days  She does not recall any single event the provoked the symptoms  The patient smokes 1 pack a day  Denies having any abdominal surgery in the past, she did have a  4 years ago  Review of Systems   Constitutional: Positive for chills and fever  HENT: Negative for nosebleeds and sore throat  Eyes: Negative for pain and discharge  Respiratory: Negative for cough and shortness of breath  Cardiovascular: Negative for chest pain and palpitations  Gastrointestinal:        As per history of present illness  Endocrine: Negative for cold intolerance and heat intolerance  Genitourinary: Negative for dysuria and hematuria  Neurological: Negative for seizures and headaches  Hematological: Negative for adenopathy   Does not bruise/bleed easily  Psychiatric/Behavioral: Negative for confusion  The patient is not nervous/anxious  Historical Information   History reviewed  No pertinent past medical history  Past Surgical History:   Procedure Laterality Date     SECTION       Social History   History   Alcohol Use No     History   Drug Use No     History   Smoking Status    Never Smoker   Smokeless Tobacco    Never Used     Family History: non-contributory    Meds/Allergies   PTA meds:   Prior to Admission Medications   Prescriptions Last Dose Informant Patient Reported?  Taking?   naproxen (NAPROSYN) 500 mg tablet   No No   Sig: Take 1 tablet (500 mg total) by mouth 2 (two) times a day with meals   omeprazole (PriLOSEC) 20 mg delayed release capsule   No No   Sig: Take 2 capsules by mouth daily for 14 days   ondansetron (ZOFRAN-ODT) 4 mg disintegrating tablet   No No   Sig: Take 1 tablet by mouth every 6 (six) hours as needed for nausea or vomiting for up to 3 days   pseudoephedrine (SUDAFED) 120 MG 12 hr tablet   No No   Sig: Take 1 tablet (120 mg total) by mouth 2 (two) times a day   sucralfate (CARAFATE) 1 g tablet   No No   Sig: Take 1 tablet by mouth 4 (four) times a day for 7 days      Facility-Administered Medications: None     No Known Allergies    Objective   First Vitals:   Blood Pressure: 131/78 (10/14/18 0457)  Pulse: 105 (10/14/18 045)  Temperature: 97 6 °F (36 4 °C) (10/14/18 0459)  Temp Source: Oral (10/14/18 045)  Respirations: 19 (10/14/18 0457)  SpO2: 100 % (10/14/18 0457)    Current Vitals:   Blood Pressure: 101/59 (10/14/18 0700)  Pulse: 86 (10/14/18 0700)  Temperature: 98 °F (36 7 °C) (10/14/18 0700)  Temp Source: Oral (10/14/18 0700)  Respirations: 18 (10/14/18 0700)  SpO2: 96 % (10/14/18 07)    No intake or output data in the 24 hours ending 10/14/18 1114    Invasive Devices     Peripheral Intravenous Line            Peripheral IV 10/14/18 Right Antecubital less than 1 day Physical Exam   Constitutional: She is oriented to person, place, and time  She appears well-developed and well-nourished  No distress  HENT:   Head: Normocephalic  Mouth/Throat: No oropharyngeal exudate  Eyes: Pupils are equal, round, and reactive to light  Scleral icterus is present  Neck: Normal range of motion  Neck supple  Cardiovascular: Normal rate and regular rhythm  No murmur heard  Pulmonary/Chest: Effort normal and breath sounds normal  No respiratory distress  Abdominal:   Abdomen is obese, soft, nondistended and moderate tender in the epigastric and right upper quadrant  Hunt sign positive  There is no visceromegaly or mass palpable  There is no abdominal wall hernia noted  Musculoskeletal: She exhibits no edema or tenderness  Lymphadenopathy:     She has no cervical adenopathy  Neurological: She is alert and oriented to person, place, and time  No cranial nerve deficit  Skin: No rash noted  No erythema  Psychiatric: She has a normal mood and affect   Her behavior is normal        Lab Results:   CBC:   Lab Results   Component Value Date    WBC 5 18 10/14/2018    HGB 14 4 10/14/2018    HCT 40 7 10/14/2018    MCV 83 10/14/2018     10/14/2018    MCH 29 3 10/14/2018    MCHC 35 4 10/14/2018    RDW 13 5 10/14/2018    MPV 9 8 10/14/2018    NRBC 0 10/14/2018   , CMP:   Lab Results   Component Value Date     (L) 10/14/2018    K 3 5 10/14/2018    CL 97 (L) 10/14/2018    CO2 24 10/14/2018    BUN 12 10/14/2018    CREATININE 1 08 10/14/2018    CALCIUM 8 4 10/14/2018     (H) 10/14/2018     (H) 10/14/2018    ALKPHOS 212 (H) 10/14/2018    EGFR 68 10/14/2018   , Coagulation: No results found for: PT, INR, APTT, Urinalysis:   Lab Results   Component Value Date    COLORU Yellow 10/14/2018    CLARITYU Clear 10/14/2018    SPECGRAV 1 010 10/14/2018    PHUR 5 5 10/14/2018    LEUKOCYTESUR Negative 10/14/2018    NITRITE Negative 10/14/2018    PROTEINUA Trace (A) 10/14/2018    GLUCOSEU Negative 10/14/2018    KETONESU Trace (A) 10/14/2018    BILIRUBINUR Moderate (A) 10/14/2018    BLOODU Negative 10/14/2018   , Amylase: No results found for: AMYLASE, Lipase:   Lab Results   Component Value Date    LIPASE 145 10/14/2018     Imaging: I have personally reviewed pertinent reports  EKG, Pathology, and Other Studies: I have personally reviewed pertinent films in PACS  CT abdomen pelvis with contrast [[id number]] Collected: 10/14/18 0654   Order Status: Completed Updated: 10/14/18 0706   Narrative:     CT ABDOMEN AND PELVIS WITH IV CONTRAST    INDICATION:   Right upper quadrant pain rule out cholecystitis  COMPARISON:  Right upper quadrant abdomen sonogram 9/23/2017    TECHNIQUE:  CT examination of the abdomen and pelvis was performed  Axial, sagittal, and coronal 2D reformatted images were created from the source data and submitted for interpretation  Radiation dose length product (DLP) for this visit:  628 mGy-cm    This examination, like all CT scans performed in the [facility], was performed utilizing techniques to minimize radiation dose exposure, including the use of iterative   reconstruction and automated exposure control  IV Contrast:  100 mL of iohexol (OMNIPAQUE)  350  Enteric Contrast:  Enteric contrast was not administered  FINDINGS:    ABDOMEN    LOWER CHEST:  Trace right pleural effusion  LIVER/BILIARY TREE:  Minimal central intrahepatic periportal tracking   Mild hepatic steatosis   Otherwise unremarkable  GALLBLADDER:  Minimal mucosal hyperemia   No radiopaque gallstones   Minimal fluid pooling in the gallbladder fossa  SPLEEN:  Unremarkable  PANCREAS:  Unremarkable  ADRENAL GLANDS:  Unremarkable  KIDNEYS/URETERS:  Unremarkable  No hydronephrosis      STOMACH AND BOWEL:  Gas fluid levels are present in the proximal and mid nondilated colon   Minimal fluid in nondilated stomach and scattered within small bowel   No bowel obstruction  APPENDIX:  A normal appendix was visualized  ABDOMINOPELVIC CAVITY:  Trace free fluid in the cul-de-sac within physiologic limits   Minimal fluid pooling in the gallbladder fossa   No free gas or enlarged no free gas   Celiac axis and agustin hepatic lymph nodes upper limits of normal in size     Otherwise, no enlarged lymph nodes  VESSELS:  Unremarkable for patient's age  PELVIS    REPRODUCTIVE ORGANS:  3 cm right ovarian dominant follicle   Otherwise unremarkable  URINARY BLADDER:  Unremarkable  ABDOMINAL WALL/INGUINAL REGIONS:  2 2 cm ventral umbilical abdominal wall diastases containing fat   No bowel herniation  No inguinal hernia or mass  OSSEOUS STRUCTURES:  No acute fracture or destructive osseous lesion  Impression:       No radiopaque gallstones   Mild gallbladder wall hyperemia without thickening likely reactive   Follow-up with gallbladder sonogram if clinically warranted  Minimal free fluid in the cul-de-sac and pooling within the gallbladder fossa   Fluid within physiologic limits   This may be sequela of leaking or ruptured right ovarian 3 cm dominant follicle   Overall, these findings are within normal limits for   premenopausal patient  Nonobstructive bowel pattern suggestive of mild gastroenteritis   No bowel obstruction  Otherwise, no acute intra-abdominal or intrapelvic process  Mild hepatic steatosis         Code Status: Level 1 - Full Code

## 2018-10-14 NOTE — ED PROVIDER NOTES
History  Chief Complaint   Patient presents with    Abdominal Pain     Pt c/o epigastric pain that started since being dx with viral infection  HPI patient is a 81-year-old female, he recently on  with some vomiting chills and diarrhea, diagnostic testing showed normal laboratory testing, patient was told that she had a viral illness  Patient reports she has persistent pain since that time now is localized to her epigastric and right upper quadrant  She points to an area in her right upper abdomen which she reports his severely tender  Points to her right upper quadrant  Patient denies any radiation of the pain  She denies any thing that makes the pain better makes the pain worse  Past medical history , recent diagnosis viral illness  Family history noncontributory  Social history, nonsmoker no history of drug abuse    Prior to Admission Medications   Prescriptions Last Dose Informant Patient Reported? Taking?   naproxen (NAPROSYN) 500 mg tablet   No No   Sig: Take 1 tablet (500 mg total) by mouth 2 (two) times a day with meals   omeprazole (PriLOSEC) 20 mg delayed release capsule   No No   Sig: Take 2 capsules by mouth daily for 14 days   ondansetron (ZOFRAN-ODT) 4 mg disintegrating tablet   No No   Sig: Take 1 tablet by mouth every 6 (six) hours as needed for nausea or vomiting for up to 3 days   pseudoephedrine (SUDAFED) 120 MG 12 hr tablet   No No   Sig: Take 1 tablet (120 mg total) by mouth 2 (two) times a day   sucralfate (CARAFATE) 1 g tablet   No No   Sig: Take 1 tablet by mouth 4 (four) times a day for 7 days      Facility-Administered Medications: None       History reviewed  No pertinent past medical history  Past Surgical History:   Procedure Laterality Date     SECTION         History reviewed  No pertinent family history  I have reviewed and agree with the history as documented      Social History   Substance Use Topics    Smoking status: Never Smoker    Smokeless tobacco: Never Used    Alcohol use No        Review of Systems   Constitutional: Negative for diaphoresis, fatigue and fever  HENT: Negative for congestion, ear pain, nosebleeds and sore throat  Eyes: Negative for photophobia, pain, discharge and visual disturbance  Respiratory: Negative for cough, choking, chest tightness, shortness of breath and wheezing  Cardiovascular: Negative for chest pain and palpitations  Gastrointestinal: Positive for abdominal pain  Negative for abdominal distention, diarrhea and vomiting  Genitourinary: Negative for dysuria, flank pain and frequency  Musculoskeletal: Negative for back pain, gait problem and joint swelling  Skin: Negative for color change and rash  Neurological: Negative for dizziness, syncope and headaches  Psychiatric/Behavioral: Negative for behavioral problems and confusion  The patient is not nervous/anxious  All other systems reviewed and are negative  Physical Exam  Physical Exam   Constitutional: She is oriented to person, place, and time  She appears well-developed and well-nourished  HENT:   Head: Normocephalic  Right Ear: External ear normal    Left Ear: External ear normal    Nose: Nose normal    Mouth/Throat: Oropharynx is clear and moist    Eyes: Pupils are equal, round, and reactive to light  EOM and lids are normal    Neck: Normal range of motion  Neck supple  Cardiovascular: Normal rate, regular rhythm, normal heart sounds and intact distal pulses  Pulmonary/Chest: Effort normal and breath sounds normal  No respiratory distress  Abdominal: Soft  Bowel sounds are normal  She exhibits no mass  There is tenderness  There is no rebound and no guarding  No hernia  There is epigastric and right upper quadrant abdominal tenderness, no rebound no guarding   Musculoskeletal: Normal range of motion  She exhibits no deformity  Neurological: She is alert and oriented to person, place, and time     Skin: Skin is warm and dry  Psychiatric: She has a normal mood and affect  Nursing note and vitals reviewed     Pulse oximetry 100% on room air adequate oxygenation, there is no hypoxia    Vital Signs  ED Triage Vitals   Temperature Pulse Respirations Blood Pressure SpO2   10/14/18 0459 10/14/18 0457 10/14/18 0457 10/14/18 0457 10/14/18 0457   97 6 °F (36 4 °C) 105 19 131/78 100 %      Temp Source Heart Rate Source Patient Position - Orthostatic VS BP Location FiO2 (%)   10/14/18 0459 10/14/18 0457 10/14/18 0457 10/14/18 0457 --   Oral Monitor Lying Right arm       Pain Score       10/14/18 0457       Worst Possible Pain           Vitals:    10/14/18 0457 10/14/18 0615 10/14/18 0653 10/14/18 0700   BP: 131/78 93/66 104/59 101/59   Pulse: 105 85 84 86   Patient Position - Orthostatic VS: Lying  Lying Lying       Visual Acuity      ED Medications  Medications   dextrose 5 % and sodium chloride 0 45 % with KCl 20 mEq/L infusion (125 mL/hr Intravenous New Bag 10/14/18 0843)   ondansetron (ZOFRAN) injection 4 mg (not administered)   enoxaparin (LOVENOX) subcutaneous injection 40 mg (40 mg Subcutaneous Given 10/14/18 0813)   famotidine (PEPCID) injection 20 mg (20 mg Intravenous Given 10/14/18 0813)   morphine injection 2 mg (not administered)   morphine (PF) 10 mg/mL injection 6 mg (6 mg Intravenous Given 10/14/18 0524)   ondansetron (ZOFRAN) injection 4 mg (4 mg Intravenous Given 10/14/18 0524)   ketorolac (TORADOL) injection 15 mg (15 mg Intravenous Given 10/14/18 0523)   sodium chloride 0 9 % bolus 1,000 mL (1,000 mL Intravenous New Bag 10/14/18 0525)   iohexol (OMNIPAQUE) 350 MG/ML injection (MULTI-DOSE) 100 mL (100 mL Intravenous Given 10/14/18 0629)   ceFAZolin (ANCEF) IVPB (premix) 2,000 mg (2,000 mg Intravenous New Bag 10/14/18 0813)       Diagnostic Studies  Results Reviewed     Procedure Component Value Units Date/Time    Platelet count [[id number]]     Lab Status:  No result Specimen:  Blood     CBC and differential [[id number]] Collected:  10/14/18 0515    Lab Status:  Final result Specimen:  Blood from Arm, Right Updated:  10/14/18 0706     WBC 5 18 Thousand/uL      RBC 4 91 Million/uL      Hemoglobin 14 4 g/dL      Hematocrit 40 7 %      MCV 83 fL      MCH 29 3 pg      MCHC 35 4 g/dL      RDW 13 5 %      MPV 9 8 fL      Platelets 600 Thousands/uL      nRBC 0 /100 WBCs     Urine Microscopic [[id number]]  (Abnormal) Collected:  10/14/18 0618    Lab Status:  Final result Specimen:  Urine from Urine, Clean Catch Updated:  10/14/18 0649     RBC, UA 0-1 (A) /hpf      WBC, UA 1-2 (A) /hpf      Epithelial Cells Occasional /hpf      Bacteria, UA None Seen /hpf     UA w Reflex to Microscopic w Reflex to Culture [[id number]]  (Abnormal) Collected:  10/14/18 0618    Lab Status:  Final result Specimen:  Urine from Urine, Clean Catch Updated:  10/14/18 0625     Color, UA Yellow     Clarity, UA Clear     Specific Gravity, UA 1 010     pH, UA 5 5     Leukocytes, UA Negative     Nitrite, UA Negative     Protein, UA Trace (A) mg/dl      Glucose, UA Negative mg/dl      Ketones, UA Trace (A) mg/dl      Urobilinogen, UA 1 0 E U /dl      Bilirubin, UA Moderate (A)     Blood, UA Negative    POCT pregnancy, urine [[id number]]  (Normal) Resulted:  10/14/18 0617    Lab Status:  Final result Updated:  10/14/18 0617     EXT PREG TEST UR (Ref: Negative) Negative    Basic metabolic panel [[id number]]  (Abnormal) Collected:  10/14/18 0515    Lab Status:  Final result Specimen:  Blood from Arm, Right Updated:  10/14/18 0559     Sodium 133 (L) mmol/L      Potassium 3 5 mmol/L      Chloride 97 (L) mmol/L      CO2 24 mmol/L      ANION GAP 12 mmol/L      BUN 12 mg/dL      Creatinine 1 08 mg/dL      Glucose 172 (H) mg/dL      Calcium 8 4 mg/dL      eGFR 68 ml/min/1 73sq m     Narrative:         National Kidney Disease Education Program recommendations are as follows:  GFR calculation is accurate only with a steady state creatinine  Chronic Kidney disease less than 60 ml/min/1 73 sq  meters  Kidney failure less than 15 ml/min/1 73 sq  meters  Hepatic function panel [[id number]]  (Abnormal) Collected:  10/14/18 0515    Lab Status:  Final result Specimen:  Blood from Arm, Right Updated:  10/14/18 0559     Total Bilirubin 2 90 (H) mg/dL      Bilirubin, Direct 2 36 (H) mg/dL      Alkaline Phosphatase 212 (H) U/L       (H) U/L       (H) U/L      Total Protein 6 5 g/dL      Albumin 3 2 (L) g/dL     Lipase [[id number]]  (Normal) Collected:  10/14/18 0515    Lab Status:  Final result Specimen:  Blood from Arm, Right Updated:  10/14/18 0559     Lipase 145 u/L                  US right upper quadrant   Final Result by Monica Kelly DO (10/14 [id number])      Marked gallbladder wall thickening with no shadowing stones or biliary dilatation  Workstation performed: [id number]         CT abdomen pelvis with contrast   Final Result by Earnestine Gomez MD (66/70 0023)      No radiopaque gallstones  Mild gallbladder wall hyperemia without thickening likely reactive  Follow-up with gallbladder sonogram if clinically warranted  Minimal free fluid in the cul-de-sac and pooling within the gallbladder fossa  Fluid within physiologic limits  This may be sequela of leaking or ruptured right ovarian 3 cm dominant follicle  Overall, these findings are within normal limits for    premenopausal patient  Nonobstructive bowel pattern suggestive of mild gastroenteritis  No bowel obstruction  Otherwise, no acute intra-abdominal or intrapelvic process  Mild hepatic steatosis  Workstation performed: [id number]         MRI inpatient order    (Results Pending)              Procedures  Procedures       Phone Contacts  ED Phone Contact    ED Course    diagnostic testing showed normal white count of 5 1 no sign of inflammation, hemoglobin was normal at 14 4 no sign of anemia  Urinalysis showed no sign of infection  Pregnancy test was negative         patient's electrolytes showed no anion gap, normal renal function  Patient had markedly abnormal liver functions test, total bilirubin was 2 9, direct bili was 2 36      CT scan the abdomen pelvis showed inflammation of the gallbladder,   discussed with patient, believe exam and CT consistent with cholecystitis, I spoke with General surgery Dr Klaus Garcia he will see the patient  MDM medical decision making 80-year-old female presents with right upper quadrant pain, focal tenderness and elevated bilirubin  Presentation consistent with cholecystitis on physical exam and CT scan  Discussed with General surgery will evaluate the patient  I placed the patient in observation    CritCare Time    Disposition  Final diagnoses:   Cholecystitis     Time reflects when diagnosis was documented in both MDM as applicable and the Disposition within this note     Time User Action Codes Description Comment    10/14/2018  7:13 AM Beena Rivear Add [K81 9] Cholecystitis       ED Disposition     ED Disposition Condition Comment    Admit  Case was discussed with Dr Klaus Garcia and the patient's admission status was agreed to be observation status        Follow-up Information    None         Current Discharge Medication List      CONTINUE these medications which have NOT CHANGED    Details   naproxen (NAPROSYN) 500 mg tablet Take 1 tablet (500 mg total) by mouth 2 (two) times a day with meals  Qty: 30 tablet, Refills: 0    Associated Diagnoses: Viral illness      omeprazole (PriLOSEC) 20 mg delayed release capsule Take 2 capsules by mouth daily for 14 days  Qty: 28 capsule, Refills: 0      ondansetron (ZOFRAN-ODT) 4 mg disintegrating tablet Take 1 tablet by mouth every 6 (six) hours as needed for nausea or vomiting for up to 3 days  Qty: 12 tablet, Refills: 0      pseudoephedrine (SUDAFED) 120 MG 12 hr tablet Take 1 tablet (120 mg total) by mouth 2 (two) times a day  Qty: 15 tablet, Refills: 0    Associated Diagnoses: Viral illness sucralfate (CARAFATE) 1 g tablet Take 1 tablet by mouth 4 (four) times a day for 7 days  Qty: 28 tablet, Refills: 0           No discharge procedures on file      ED Provider  Electronically Signed by           Kenneth Taylor MD  10/14/18 [phone]

## 2018-10-15 ENCOUNTER — ANESTHESIA EVENT (INPATIENT)
Dept: PERIOP | Facility: HOSPITAL | Age: 33
DRG: 263 | End: 2018-10-15
Payer: COMMERCIAL

## 2018-10-15 ENCOUNTER — APPOINTMENT (OUTPATIENT)
Dept: MRI IMAGING | Facility: HOSPITAL | Age: 33
DRG: 263 | End: 2018-10-15
Payer: COMMERCIAL

## 2018-10-15 LAB
ALBUMIN SERPL BCP-MCNC: 2.4 G/DL (ref 3.5–5)
ALP SERPL-CCNC: 194 U/L (ref 46–116)
ALT SERPL W P-5'-P-CCNC: 304 U/L (ref 12–78)
ANION GAP SERPL CALCULATED.3IONS-SCNC: 10 MMOL/L (ref 4–13)
AST SERPL W P-5'-P-CCNC: 409 U/L (ref 5–45)
BASOPHILS # BLD MANUAL: 0 THOUSAND/UL (ref 0–0.1)
BASOPHILS NFR MAR MANUAL: 0 % (ref 0–1)
BILIRUB SERPL-MCNC: 3.1 MG/DL (ref 0.2–1)
BUN SERPL-MCNC: 9 MG/DL (ref 5–25)
CALCIUM SERPL-MCNC: 7 MG/DL (ref 8.3–10.1)
CHLORIDE SERPL-SCNC: 103 MMOL/L (ref 100–108)
CO2 SERPL-SCNC: 20 MMOL/L (ref 21–32)
CREAT SERPL-MCNC: 0.83 MG/DL (ref 0.6–1.3)
EOSINOPHIL # BLD MANUAL: 0 THOUSAND/UL (ref 0–0.4)
EOSINOPHIL NFR BLD MANUAL: 0 % (ref 0–6)
ERYTHROCYTE [DISTWIDTH] IN BLOOD BY AUTOMATED COUNT: 14.1 % (ref 11.6–15.1)
FLUAV AG SPEC QL: NORMAL
FLUBV AG SPEC QL: NORMAL
GFR SERPL CREATININE-BSD FRML MDRD: 94 ML/MIN/1.73SQ M
GLUCOSE P FAST SERPL-MCNC: 128 MG/DL (ref 65–99)
GLUCOSE SERPL-MCNC: 128 MG/DL (ref 65–140)
HCT VFR BLD AUTO: 37.6 % (ref 34.8–46.1)
HGB BLD-MCNC: 13.2 G/DL (ref 11.5–15.4)
LYMPHOCYTES # BLD AUTO: 0.22 THOUSAND/UL (ref 0.6–4.47)
LYMPHOCYTES # BLD AUTO: 7 % (ref 14–44)
MCH RBC QN AUTO: 29.3 PG (ref 26.8–34.3)
MCHC RBC AUTO-ENTMCNC: 35.1 G/DL (ref 31.4–37.4)
MCV RBC AUTO: 83 FL (ref 82–98)
MONOCYTES # BLD AUTO: 0.16 THOUSAND/UL (ref 0–1.22)
MONOCYTES NFR BLD: 5 % (ref 4–12)
NEUTROPHILS # BLD MANUAL: 2.75 THOUSAND/UL (ref 1.85–7.62)
NEUTS BAND NFR BLD MANUAL: 25 % (ref 0–8)
NEUTS SEG NFR BLD AUTO: 63 % (ref 43–75)
NRBC BLD AUTO-RTO: 0 /100 WBCS
PLATELET # BLD AUTO: 113 THOUSANDS/UL (ref 149–390)
PLATELET BLD QL SMEAR: ABNORMAL
PMV BLD AUTO: 10.2 FL (ref 8.9–12.7)
POTASSIUM SERPL-SCNC: 4 MMOL/L (ref 3.5–5.3)
PROT SERPL-MCNC: 5.2 G/DL (ref 6.4–8.2)
RBC # BLD AUTO: 4.51 MILLION/UL (ref 3.81–5.12)
RSV B RNA SPEC QL NAA+PROBE: NORMAL
SODIUM SERPL-SCNC: 133 MMOL/L (ref 136–145)
TOTAL CELLS COUNTED SPEC: 100
WBC # BLD AUTO: 3.13 THOUSAND/UL (ref 4.31–10.16)

## 2018-10-15 PROCEDURE — 85007 BL SMEAR W/DIFF WBC COUNT: CPT | Performed by: SURGERY

## 2018-10-15 PROCEDURE — 99232 SBSQ HOSP IP/OBS MODERATE 35: CPT | Performed by: PHYSICIAN ASSISTANT

## 2018-10-15 PROCEDURE — 76377 3D RENDER W/INTRP POSTPROCES: CPT

## 2018-10-15 PROCEDURE — 74181 MRI ABDOMEN W/O CONTRAST: CPT

## 2018-10-15 PROCEDURE — 80053 COMPREHEN METABOLIC PANEL: CPT | Performed by: SURGERY

## 2018-10-15 PROCEDURE — 85027 COMPLETE CBC AUTOMATED: CPT | Performed by: SURGERY

## 2018-10-15 RX ADMIN — FAMOTIDINE 20 MG: 10 INJECTION, SOLUTION INTRAVENOUS at 10:43

## 2018-10-15 RX ADMIN — MORPHINE SULFATE 2 MG: 2 INJECTION, SOLUTION INTRAMUSCULAR; INTRAVENOUS at 18:47

## 2018-10-15 RX ADMIN — ONDANSETRON 4 MG: 2 INJECTION INTRAMUSCULAR; INTRAVENOUS at 06:20

## 2018-10-15 RX ADMIN — ENOXAPARIN SODIUM 40 MG: 40 INJECTION SUBCUTANEOUS at 10:43

## 2018-10-15 RX ADMIN — PIPERACILLIN SODIUM,TAZOBACTAM SODIUM 3.38 G: 3; .375 INJECTION, POWDER, FOR SOLUTION INTRAVENOUS at 21:35

## 2018-10-15 RX ADMIN — DEXTROSE, SODIUM CHLORIDE, AND POTASSIUM CHLORIDE 125 ML/HR: 5; .45; .15 INJECTION INTRAVENOUS at 21:35

## 2018-10-15 RX ADMIN — Medication 1 SPRAY: at 17:15

## 2018-10-15 RX ADMIN — DEXTROSE, SODIUM CHLORIDE, AND POTASSIUM CHLORIDE 125 ML/HR: 5; .45; .15 INJECTION INTRAVENOUS at 09:04

## 2018-10-15 RX ADMIN — MORPHINE SULFATE 2 MG: 2 INJECTION, SOLUTION INTRAMUSCULAR; INTRAVENOUS at 06:16

## 2018-10-15 RX ADMIN — FAMOTIDINE 20 MG: 10 INJECTION, SOLUTION INTRAVENOUS at 17:12

## 2018-10-15 RX ADMIN — Medication 1 SPRAY: at 21:29

## 2018-10-15 RX ADMIN — PIPERACILLIN SODIUM,TAZOBACTAM SODIUM 3.38 G: 3; .375 INJECTION, POWDER, FOR SOLUTION INTRAVENOUS at 15:13

## 2018-10-15 RX ADMIN — PIPERACILLIN SODIUM,TAZOBACTAM SODIUM 3.38 G: 3; .375 INJECTION, POWDER, FOR SOLUTION INTRAVENOUS at 09:00

## 2018-10-15 RX ADMIN — DEXTROSE, SODIUM CHLORIDE, AND POTASSIUM CHLORIDE 125 ML/HR: 5; .45; .15 INJECTION INTRAVENOUS at 01:40

## 2018-10-15 RX ADMIN — MORPHINE SULFATE 2 MG: 2 INJECTION, SOLUTION INTRAMUSCULAR; INTRAVENOUS at 12:25

## 2018-10-15 RX ADMIN — ONDANSETRON 4 MG: 2 INJECTION INTRAMUSCULAR; INTRAVENOUS at 12:25

## 2018-10-15 NOTE — UTILIZATION REVIEW
OBS order  10/14 @  9126 converted to IP on 10/15 @  1119 for treatment of elevated bilirubin     Admitting Physician KRISHAN REA    Level of Care Med Surg    Estimated length of stay More than 2 Midnights    Certification I certify that inpatient services are medically necessary for this patient for a duration of greater than two midnights  See H&P and MD Progress Notes for additional information about the patient's course of treatment

## 2018-10-15 NOTE — PROGRESS NOTES
Progress Note - General Surgery   Victorina Floyd 29 y o  female MRN: [id number]  Unit/Bed#: -01 Encounter: [id number]    Assessment:  1  Victorina Floyd is a 29 y o  female with acute cholecystitis with elevated LFTs, suspected cholangitis    Plan:  -Plan for [facility] today to evaluate patency of biliary tree  LFTs trending upwards, suggestive of choledocholithiasis  If MRCP shows CBD stone, will consult GI for ERCP   -Patient will require laparoscopic cholecystectomy, possible open, prior to discharge pending results of MRCP   -Continue NPO/IVF/IV Antibiotics  -Trend labs  -Serial abdominal exams    Subjective/Objective     Subjective: Lying in bed in no acute distress  Abdominal pain improved as she has not had anything to eat  Felt febrile overnight, denies chills, chest pain, shortness of breath, nausea, or vomiting  Able to urinate and passing gas  Ambulating  Does have a cough  No other complaints  Objective:     Blood pressure 110/60, pulse 102, temperature 99 °F (37 2 °C), temperature source Oral, resp  rate 18, height 5' 1" (1 549 m), weight 80 kg (176 lb 5 9 oz), SpO2 96 %  ,Body mass index is 33 32 kg/m²        Intake/Output Summary (Last 24 hours) at 10/15/18 0931  Last data filed at 10/15/18 0140   Gross per 24 hour   Intake             1000 ml   Output                0 ml   Net             1000 ml       Invasive Devices     Peripheral Intravenous Line            Peripheral IV 10/14/18 Right Antecubital 1 day                Physical Exam: /60 (BP Location: Left arm)   Pulse 102   Temp 99 °F (37 2 °C) (Oral)   Resp 18   Ht 5' 1" (1 549 m)   Wt 80 kg (176 lb 5 9 oz)   SpO2 96%   BMI 33 32 kg/m²   General appearance: alert and oriented, in no acute distress  Lungs: clear to auscultation bilaterally  Heart: regular rate and rhythm, S1, S2 normal, no murmur, click, rub or gallop  Abdomen: soft, non-distended, active bowel sounds, focally tender in RUQ without guarding or rebound tenderness, no xochitliuse peritoneal signs    Lab, Imaging and other studies:I have personally reviewed pertinent lab results       VTE Pharmacologic Prophylaxis: Enoxaparin (Lovenox)  VTE Mechanical Prophylaxis: sequential compression device    Recent Results (from the past 36 hour(s))   CBC and differential    Collection Time: 10/14/18  5:15 AM   Result Value Ref Range    WBC 5 18 4 31 - 10 16 Thousand/uL    RBC 4 91 3 81 - 5 12 Million/uL    Hemoglobin 14 4 11 5 - 15 4 g/dL    Hematocrit 40 7 34 8 - 46 1 %    MCV 83 82 - 98 fL    MCH 29 3 26 8 - 34 3 pg    MCHC 35 4 31 4 - 37 4 g/dL    RDW 13 5 11 6 - 15 1 %    MPV 9 8 8 9 - 12 7 fL    Platelets 201 659 - 000 Thousands/uL    nRBC 0 /100 WBCs   Basic metabolic panel    Collection Time: 10/14/18  5:15 AM   Result Value Ref Range    Sodium 133 (L) 136 - 145 mmol/L    Potassium 3 5 3 5 - 5 3 mmol/L    Chloride 97 (L) 100 - 108 mmol/L    CO2 24 21 - 32 mmol/L    ANION GAP 12 4 - 13 mmol/L    BUN 12 5 - 25 mg/dL    Creatinine 1 08 0 60 - 1 30 mg/dL    Glucose 172 (H) 65 - 140 mg/dL    Calcium 8 4 8 3 - 10 1 mg/dL    eGFR 68 ml/min/1 73sq m   Hepatic function panel    Collection Time: 10/14/18  5:15 AM   Result Value Ref Range    Total Bilirubin 2 90 (H) 0 20 - 1 00 mg/dL    Bilirubin, Direct 2 36 (H) 0 00 - 0 20 mg/dL    Alkaline Phosphatase 212 (H) 46 - 116 U/L     (H) 5 - 45 U/L     (H) 12 - 78 U/L    Total Protein 6 5 6 4 - 8 2 g/dL    Albumin 3 2 (L) 3 5 - 5 0 g/dL   Lipase    Collection Time: 10/14/18  5:15 AM   Result Value Ref Range    Lipase 145 73 - 393 u/L   Manual Differential(PHLEBS Do Not Order)    Collection Time: 10/14/18  5:15 AM   Result Value Ref Range    Segmented % 78 (H) 43 - 75 %    Bands % 12 (H) 0 - 8 %    Lymphocytes % 5 (L) 14 - 44 %    Monocytes % 2 (L) 4 - 12 %    Eosinophils % 0 0 - 6 %    Basophils % 0 0 - 1 %    Metamyelocytes% 1 0 - 1 %    Myelocytes % 2 (H) 0 - 1 %    Absolute Neutrophils 4 66 1 85 - 7 62 Thousand/uL    Lymphocytes Absolute 0 26 (L) 0 60 - 4 47 Thousand/uL    Monocytes Absolute 0 10 0 00 - 1 22 Thousand/uL    Eosinophils Absolute 0 00 0 00 - 0 40 Thousand/uL    Basophils Absolute 0 00 0 00 - 0 10 Thousand/uL    Total Counted 100     RBC Morphology Normal     Platelet Estimate Adequate Adequate   POCT pregnancy, urine    Collection Time: 10/14/18  6:17 AM   Result Value Ref Range    EXT PREG TEST UR (Ref: Negative) Negative    UA w Reflex to Microscopic w Reflex to Culture    Collection Time: 10/14/18  6:18 AM   Result Value Ref Range    Color, UA Yellow     Clarity, UA Clear     Specific [place], UA 1 010 1 003 - 1 030    pH, UA 5 5 4 5 - 8 0    Leukocytes, UA Negative Negative    Nitrite, UA Negative Negative    Protein, UA Trace (A) Negative mg/dl    Glucose, UA Negative Negative mg/dl    Ketones, UA Trace (A) Negative mg/dl    Urobilinogen, UA 1 0 0 2, 1 0 E U /dl E U /dl    Bilirubin, UA Moderate (A) Negative    Blood, UA Negative Negative   Urine Microscopic    Collection Time: 10/14/18  6:18 AM   Result Value Ref Range    RBC, UA 0-1 (A) None Seen, 0-5 /hpf    WBC, UA 1-2 (A) None Seen, 0-5, 5-55, 5-65 /hpf    Epithelial Cells Occasional None Seen, Occasional /hpf    Bacteria, UA None Seen None Seen, Occasional /hpf   Rapid Strep A Screen With Reflex to Culture, Pediatrics and Compromised Adults    Collection Time: 10/14/18 10:44 PM   Result Value Ref Range    Rapid Strep A Screen Negative Negative   CBC and differential    Collection Time: 10/15/18  6:25 AM   Result Value Ref Range    WBC 3 13 (L) 4 31 - 10 16 Thousand/uL    RBC 4 51 3 81 - 5 12 Million/uL    Hemoglobin 13 2 11 5 - 15 4 g/dL    Hematocrit 37 6 34 8 - 46 1 %    MCV 83 82 - 98 fL    MCH 29 3 26 8 - 34 3 pg    MCHC 35 1 31 4 - 37 4 g/dL    RDW 14 1 11 6 - 15 1 %    MPV 10 2 8 9 - 12 7 fL    Platelets 100 (L) 087 - 390 Thousands/uL    nRBC 0 /100 WBCs   Comprehensive metabolic panel    Collection Time: 10/15/18  6:25 AM   Result Value Ref Range    Sodium 133 (L) 136 - 145 mmol/L    Potassium 4 0 3 5 - 5 3 mmol/L    Chloride 103 100 - 108 mmol/L    CO2 20 (L) 21 - 32 mmol/L    ANION GAP 10 4 - 13 mmol/L    BUN 9 5 - 25 mg/dL    Creatinine 0 83 0 60 - 1 30 mg/dL    Glucose 128 65 - 140 mg/dL    Glucose, Fasting 128 (H) 65 - 99 mg/dL    Calcium 7 0 (L) 8 3 - 10 1 mg/dL     (H) 5 - 45 U/L     (H) 12 - 78 U/L    Alkaline Phosphatase 194 (H) 46 - 116 U/L    Total Protein 5 2 (L) 6 4 - 8 2 g/dL    Albumin 2 4 (L) 3 5 - 5 0 g/dL    Total Bilirubin 3 10 (H) 0 20 - 1 00 mg/dL    eGFR 94 ml/min/1 73sq m   Manual Differential(PHLEBS Do Not Order)    Collection Time: 10/15/18  6:25 AM   Result Value Ref Range    Segmented % 63 43 - 75 %    Bands % 25 (H) 0 - 8 %    Lymphocytes % 7 (L) 14 - 44 %    Monocytes % 5 4 - 12 %    Eosinophils % 0 0 - 6 %    Basophils % 0 0 - 1 %    Absolute Neutrophils 2 75 1 85 - 7 62 Thousand/uL    Lymphocytes Absolute 0 22 (L) 0 60 - 4 47 Thousand/uL    Monocytes Absolute 0 16 0 00 - 1 22 Thousand/uL    Eosinophils Absolute 0 00 0 00 - 0 40 Thousand/uL    Basophils Absolute 0 00 0 00 - 0 10 Thousand/uL    Total Counted 100     Platelet Estimate Borderline (A) Adequate

## 2018-10-16 ENCOUNTER — APPOINTMENT (INPATIENT)
Dept: RADIOLOGY | Facility: HOSPITAL | Age: 33
DRG: 263 | End: 2018-10-16
Payer: COMMERCIAL

## 2018-10-16 ENCOUNTER — ANESTHESIA (INPATIENT)
Dept: PERIOP | Facility: HOSPITAL | Age: 33
DRG: 263 | End: 2018-10-16
Payer: COMMERCIAL

## 2018-10-16 PROCEDURE — 47563 LAPARO CHOLECYSTECTOMY/GRAPH: CPT | Performed by: SURGERY

## 2018-10-16 PROCEDURE — 0FT44ZZ RESECTION OF GALLBLADDER, PERCUTANEOUS ENDOSCOPIC APPROACH: ICD-10-PCS | Performed by: SURGERY

## 2018-10-16 PROCEDURE — BF43ZZZ ULTRASONOGRAPHY OF GALLBLADDER AND BILE DUCTS: ICD-10-PCS | Performed by: SURGERY

## 2018-10-16 PROCEDURE — C1729 CATH, DRAINAGE: HCPCS | Performed by: SURGERY

## 2018-10-16 PROCEDURE — 88304 TISSUE EXAM BY PATHOLOGIST: CPT | Performed by: PATHOLOGY

## 2018-10-16 PROCEDURE — BF131ZZ FLUOROSCOPY OF GALLBLADDER AND BILE DUCTS USING LOW OSMOLAR CONTRAST: ICD-10-PCS | Performed by: SURGERY

## 2018-10-16 PROCEDURE — 74300 X-RAY BILE DUCTS/PANCREAS: CPT

## 2018-10-16 RX ORDER — PROPOFOL 10 MG/ML
INJECTION, EMULSION INTRAVENOUS AS NEEDED
Status: DISCONTINUED | OUTPATIENT
Start: 2018-10-16 | End: 2018-10-16 | Stop reason: SURG

## 2018-10-16 RX ORDER — HYDROMORPHONE HYDROCHLORIDE 2 MG/ML
INJECTION, SOLUTION INTRAMUSCULAR; INTRAVENOUS; SUBCUTANEOUS AS NEEDED
Status: DISCONTINUED | OUTPATIENT
Start: 2018-10-16 | End: 2018-10-16 | Stop reason: SURG

## 2018-10-16 RX ORDER — NICOTINE 21 MG/24HR
14 PATCH, TRANSDERMAL 24 HOURS TRANSDERMAL DAILY
Status: DISCONTINUED | OUTPATIENT
Start: 2018-10-16 | End: 2018-10-20 | Stop reason: HOSPADM

## 2018-10-16 RX ORDER — ONDANSETRON 2 MG/ML
4 INJECTION INTRAMUSCULAR; INTRAVENOUS ONCE AS NEEDED
Status: DISCONTINUED | OUTPATIENT
Start: 2018-10-16 | End: 2018-10-16 | Stop reason: HOSPADM

## 2018-10-16 RX ORDER — SODIUM CHLORIDE, SODIUM LACTATE, POTASSIUM CHLORIDE, CALCIUM CHLORIDE 600; 310; 30; 20 MG/100ML; MG/100ML; MG/100ML; MG/100ML
75 INJECTION, SOLUTION INTRAVENOUS CONTINUOUS
Status: DISCONTINUED | OUTPATIENT
Start: 2018-10-16 | End: 2018-10-16

## 2018-10-16 RX ORDER — MIDAZOLAM HYDROCHLORIDE 1 MG/ML
INJECTION INTRAMUSCULAR; INTRAVENOUS AS NEEDED
Status: DISCONTINUED | OUTPATIENT
Start: 2018-10-16 | End: 2018-10-16 | Stop reason: SURG

## 2018-10-16 RX ORDER — SUCCINYLCHOLINE CHLORIDE 20 MG/ML
INJECTION INTRAMUSCULAR; INTRAVENOUS AS NEEDED
Status: DISCONTINUED | OUTPATIENT
Start: 2018-10-16 | End: 2018-10-16 | Stop reason: SURG

## 2018-10-16 RX ORDER — LIDOCAINE HYDROCHLORIDE 10 MG/ML
INJECTION, SOLUTION INFILTRATION; PERINEURAL AS NEEDED
Status: DISCONTINUED | OUTPATIENT
Start: 2018-10-16 | End: 2018-10-16 | Stop reason: SURG

## 2018-10-16 RX ORDER — GLYCOPYRROLATE 0.2 MG/ML
INJECTION INTRAMUSCULAR; INTRAVENOUS AS NEEDED
Status: DISCONTINUED | OUTPATIENT
Start: 2018-10-16 | End: 2018-10-16 | Stop reason: SURG

## 2018-10-16 RX ORDER — HYDROMORPHONE HCL/PF 1 MG/ML
0.2 SYRINGE (ML) INJECTION
Status: DISCONTINUED | OUTPATIENT
Start: 2018-10-16 | End: 2018-10-16 | Stop reason: HOSPADM

## 2018-10-16 RX ORDER — SODIUM CHLORIDE, SODIUM LACTATE, POTASSIUM CHLORIDE, CALCIUM CHLORIDE 600; 310; 30; 20 MG/100ML; MG/100ML; MG/100ML; MG/100ML
INJECTION, SOLUTION INTRAVENOUS CONTINUOUS PRN
Status: DISCONTINUED | OUTPATIENT
Start: 2018-10-16 | End: 2018-10-16

## 2018-10-16 RX ORDER — FENTANYL CITRATE/PF 50 MCG/ML
50 SYRINGE (ML) INJECTION
Status: DISCONTINUED | OUTPATIENT
Start: 2018-10-16 | End: 2018-10-16 | Stop reason: HOSPADM

## 2018-10-16 RX ORDER — ROCURONIUM BROMIDE 10 MG/ML
INJECTION, SOLUTION INTRAVENOUS AS NEEDED
Status: DISCONTINUED | OUTPATIENT
Start: 2018-10-16 | End: 2018-10-16 | Stop reason: SURG

## 2018-10-16 RX ORDER — MEPERIDINE HYDROCHLORIDE 50 MG/ML
12.5 INJECTION INTRAMUSCULAR; INTRAVENOUS; SUBCUTANEOUS
Status: DISCONTINUED | OUTPATIENT
Start: 2018-10-16 | End: 2018-10-16 | Stop reason: HOSPADM

## 2018-10-16 RX ORDER — MAGNESIUM HYDROXIDE 1200 MG/15ML
LIQUID ORAL AS NEEDED
Status: DISCONTINUED | OUTPATIENT
Start: 2018-10-16 | End: 2018-10-16 | Stop reason: HOSPADM

## 2018-10-16 RX ORDER — BUPIVACAINE HYDROCHLORIDE 2.5 MG/ML
INJECTION, SOLUTION EPIDURAL; INFILTRATION; INTRACAUDAL AS NEEDED
Status: DISCONTINUED | OUTPATIENT
Start: 2018-10-16 | End: 2018-10-16 | Stop reason: HOSPADM

## 2018-10-16 RX ORDER — SODIUM CHLORIDE, SODIUM LACTATE, POTASSIUM CHLORIDE, CALCIUM CHLORIDE 600; 310; 30; 20 MG/100ML; MG/100ML; MG/100ML; MG/100ML
125 INJECTION, SOLUTION INTRAVENOUS CONTINUOUS
Status: DISCONTINUED | OUTPATIENT
Start: 2018-10-16 | End: 2018-10-17

## 2018-10-16 RX ORDER — FENTANYL CITRATE 50 UG/ML
INJECTION, SOLUTION INTRAMUSCULAR; INTRAVENOUS AS NEEDED
Status: DISCONTINUED | OUTPATIENT
Start: 2018-10-16 | End: 2018-10-16 | Stop reason: SURG

## 2018-10-16 RX ADMIN — PROPOFOL 200 MG: 10 INJECTION, EMULSION INTRAVENOUS at 07:43

## 2018-10-16 RX ADMIN — ONDANSETRON 4 MG: 2 INJECTION INTRAMUSCULAR; INTRAVENOUS at 20:23

## 2018-10-16 RX ADMIN — SODIUM CHLORIDE 500 ML: 0.9 INJECTION, SOLUTION INTRAVENOUS at 12:30

## 2018-10-16 RX ADMIN — SODIUM CHLORIDE, SODIUM LACTATE, POTASSIUM CHLORIDE, AND CALCIUM CHLORIDE 125 ML/HR: .6; .31; .03; .02 INJECTION, SOLUTION INTRAVENOUS at 23:45

## 2018-10-16 RX ADMIN — SODIUM CHLORIDE, SODIUM LACTATE, POTASSIUM CHLORIDE, AND CALCIUM CHLORIDE: .6; .31; .03; .02 INJECTION, SOLUTION INTRAVENOUS at 07:16

## 2018-10-16 RX ADMIN — PIPERACILLIN SODIUM,TAZOBACTAM SODIUM 3.38 G: 3; .375 INJECTION, POWDER, FOR SOLUTION INTRAVENOUS at 14:33

## 2018-10-16 RX ADMIN — FENTANYL CITRATE 100 MCG: 50 INJECTION, SOLUTION INTRAMUSCULAR; INTRAVENOUS at 07:52

## 2018-10-16 RX ADMIN — HYDROMORPHONE HYDROCHLORIDE 0.4 MG: 2 INJECTION, SOLUTION INTRAMUSCULAR; INTRAVENOUS; SUBCUTANEOUS at 08:43

## 2018-10-16 RX ADMIN — MORPHINE SULFATE 2 MG: 2 INJECTION, SOLUTION INTRAMUSCULAR; INTRAVENOUS at 19:43

## 2018-10-16 RX ADMIN — ONDANSETRON 4 MG: 2 INJECTION INTRAMUSCULAR; INTRAVENOUS at 14:33

## 2018-10-16 RX ADMIN — DEXAMETHASONE SODIUM PHOSPHATE 10 MG: 10 INJECTION INTRAMUSCULAR; INTRAVENOUS at 08:21

## 2018-10-16 RX ADMIN — SUCCINYLCHOLINE CHLORIDE 100 MG: 20 INJECTION, SOLUTION INTRAMUSCULAR; INTRAVENOUS at 07:44

## 2018-10-16 RX ADMIN — ONDANSETRON 4 MG: 2 INJECTION INTRAMUSCULAR; INTRAVENOUS at 08:21

## 2018-10-16 RX ADMIN — PIPERACILLIN SODIUM,TAZOBACTAM SODIUM 3.38 G: 3; .375 INJECTION, POWDER, FOR SOLUTION INTRAVENOUS at 20:58

## 2018-10-16 RX ADMIN — SODIUM CHLORIDE, SODIUM LACTATE, POTASSIUM CHLORIDE, AND CALCIUM CHLORIDE 75 ML/HR: .6; .31; .03; .02 INJECTION, SOLUTION INTRAVENOUS at 09:39

## 2018-10-16 RX ADMIN — FAMOTIDINE 20 MG: 10 INJECTION, SOLUTION INTRAVENOUS at 18:34

## 2018-10-16 RX ADMIN — DEXTROSE, SODIUM CHLORIDE, AND POTASSIUM CHLORIDE 125 ML/HR: 5; .45; .15 INJECTION INTRAVENOUS at 06:09

## 2018-10-16 RX ADMIN — NEOSTIGMINE METHYLSULFATE 3 MG: 1 INJECTION, SOLUTION INTRAMUSCULAR; INTRAVENOUS; SUBCUTANEOUS at 08:37

## 2018-10-16 RX ADMIN — FENTANYL CITRATE 100 MCG: 50 INJECTION, SOLUTION INTRAMUSCULAR; INTRAVENOUS at 07:43

## 2018-10-16 RX ADMIN — LIDOCAINE HYDROCHLORIDE 50 MG: 10 INJECTION, SOLUTION INFILTRATION; PERINEURAL at 07:41

## 2018-10-16 RX ADMIN — PIPERACILLIN SODIUM,TAZOBACTAM SODIUM 3.38 G: 3; .375 INJECTION, POWDER, FOR SOLUTION INTRAVENOUS at 07:57

## 2018-10-16 RX ADMIN — GLYCOPYRROLATE 0.6 MG: 0.2 INJECTION, SOLUTION INTRAMUSCULAR; INTRAVENOUS at 08:37

## 2018-10-16 RX ADMIN — HYDROMORPHONE HYDROCHLORIDE 0.4 MG: 2 INJECTION, SOLUTION INTRAMUSCULAR; INTRAVENOUS; SUBCUTANEOUS at 08:40

## 2018-10-16 RX ADMIN — MORPHINE SULFATE 2 MG: 2 INJECTION, SOLUTION INTRAMUSCULAR; INTRAVENOUS at 23:05

## 2018-10-16 RX ADMIN — MORPHINE SULFATE 2 MG: 2 INJECTION, SOLUTION INTRAMUSCULAR; INTRAVENOUS at 11:57

## 2018-10-16 RX ADMIN — ROCURONIUM BROMIDE 40 MG: 10 INJECTION INTRAVENOUS at 07:41

## 2018-10-16 RX ADMIN — MIDAZOLAM HYDROCHLORIDE 2 MG: 1 INJECTION, SOLUTION INTRAMUSCULAR; INTRAVENOUS at 07:37

## 2018-10-16 RX ADMIN — PIPERACILLIN SODIUM,TAZOBACTAM SODIUM 3.38 G: 3; .375 INJECTION, POWDER, FOR SOLUTION INTRAVENOUS at 03:24

## 2018-10-16 NOTE — ANESTHESIA POSTPROCEDURE EVALUATION
Post-Op Assessment Note      CV Status:  Stable    Mental Status:  Awake    Hydration Status:  Stable    PONV Controlled:  None    Airway Patency:  Patent  Airway: intubated    Post Op Vitals Reviewed: Yes          Staff: Anesthesiologist, CRNA           /57 (10/16/18 0850)    Temp 99 2 °F (37 3 °C) (10/16/18 0850)    Pulse (!) 117 (10/16/18 0850)   Resp (!) 34 (10/16/18 0850)    SpO2 98 % (10/16/18 0850)

## 2018-10-16 NOTE — OP NOTE
OPERATIVE REPORT  PATIENT NAME: Oneyda Morse    :  1985  MRN: [id number]  Pt Location: [facility] OR ROOM 03    SURGERY DATE: 10/16/2018    Surgeon(s) and Role:     * Lore Collins MD - Primary     * Dorris Halsted, PA-C - Assisting    Preop Diagnosis:  Acute cholecystitis [K81 0]    Post-Op Diagnosis Codes:     * Acute cholecystitis [K81 0]    Procedure(s) (LRB):  CHOLECYSTECTOMY LAPAROSCOPIC w/ IOC (N/A)    Specimen(s):  ID Type Source Tests Collected by Time Destination   1 : gallbladder Tissue Gallbladder TISSUE EXAM Lore Collins MD 10/16/2018 9318        Estimated Blood Loss:   Minimal    Drains:  NG/OG/Enteral Tube Orogastric 16 Fr Center mouth (Active)   Number of days: 0       Anesthesia Type:   General    Operative Indications:  Acute acalculous cholecystitis [K81 0]  Elevated liver function tests    Operative Findings: The gallbladder was markedly distended with edema of the wall throughout, liver surface shows fatty infiltration and hepatomegaly  Intraoperative cholangiograms failed to reveal any proximal distal filling defects and the contrast went freely into the small bowel  The rest of the abdominal cavity showed no evidence of inflammatory or neoplastic process  Complications:   None    Procedure and Technique:    The patient was identified and she was placed in the operating table in a supine position  After adequate anesthesia induction and satisfactory endotracheal intubation the abdomen was prepped and draped under sterile usual fashion with ChloraPrep  The abdominal wall was elevated with towel clips, an incision was made through the umbilicus and 5 mm trocar was introduced  After verifying the position and the abdomen was insufflated with CO2  After obtaining adequate pneumoperitoneum the scope was advanced and exploration was performed with above findings   11 mm trocar was placed in the epigastric area and 5 mm trocar in the midclavicular and anterior axillary line under direct vision  The fundus of the gallbladder was grasped and pulled towards the right shoulder  The infundibulum of the gallbladder was grasped and pulled towards the right side  The cystic duct was identified, dissected and  stapled distally  An incision was made over the cystic duct with the scissors  Cholangiogram catheter was inserted through a separate stab wound and inserted into the cystic duct and secured with the Endo Clip  Intraoperative cholangiogram was performed with the help of the C-arm with the above findings  The cholangiogram catheter was removed and the cystic duct was stapled proximally ×2 and then divided with scissors  The cystic artery was also identified, dissected, stapled proximally and distally and then divided with scissors  The gallbladder was removed from the gallbladder fossa using electrocautery and the hook  The gallbladder was retrieved through the epigastric port site with the help of the Endo Catch  The abdominal cavity was copiously irrigated with saline solution  The gallbladder fossa was dry  The cystic duct and cystic artery were inspected with no evidence of bile leak or bleeding respectively  The ports were removed under direct vision without evidence of bleeding from the abdominal wall  The subcutaneous tissue was infiltrated with 0 25% Marcaine and the skin was closed with a 4-0 Vicryl in an interrupted significant fashion  Sterile dressings were applied  At the end of the case instrument, needles, sponges counts were correct  The patient tolerated the procedure well and then he was transferred to recovery room in a stable conditions       I was present for the entire procedure, A qualified resident physician was not available and A physician assistant was required during the procedure for retraction tissue handling,dissection and suturing    Patient Disposition:  PACU , hemodynamically stable and extubated and stable    SIGNATURE: Judy Greenfield MD  DATE: October 16, 2018  TIME: 8:33 AM

## 2018-10-16 NOTE — ANESTHESIA PREPROCEDURE EVALUATION
Review of Systems/Medical History  Patient summary reviewed        Cardiovascular  EKG reviewed, Negative cardio ROS    Pulmonary  Smoker cigarette smoker  , Tobacco cessation counseling given ,        GI/Hepatic  Negative GI/hepatic ROS          Negative  ROS        Endo/Other    Obesity    GYN  Negative gynecology ROS          Hematology    Thrombocytopenia,    Musculoskeletal  Negative musculoskeletal ROS        Neurology  Negative neurology ROS      Psychology   Negative psychology ROS              Physical Exam    Airway    Mallampati score: I  TM Distance: >3 FB  Neck ROM: full     Dental       Cardiovascular  Comment: Negative ROS, Cardiovascular exam normal    Pulmonary  Pulmonary exam normal     Other Findings        Anesthesia Plan  ASA Score- 2     Anesthesia Type- general with ASA Monitors  Additional Monitors:   Airway Plan: ETT  Plan Factors-    Induction- intravenous  Postoperative Plan-     Informed Consent- Anesthetic plan and risks discussed with patient  I personally reviewed this patient with the CRNA  Discussed and agreed on the Anesthesia Plan with the CRNA  Yesy Denis

## 2018-10-17 LAB
ALBUMIN SERPL BCP-MCNC: 2.1 G/DL (ref 3.5–5)
ALP SERPL-CCNC: 219 U/L (ref 46–116)
ALT SERPL W P-5'-P-CCNC: 273 U/L (ref 12–78)
ANION GAP SERPL CALCULATED.3IONS-SCNC: 10 MMOL/L (ref 4–13)
AST SERPL W P-5'-P-CCNC: 259 U/L (ref 5–45)
BACTERIA THROAT CULT: NORMAL
BASOPHILS # BLD MANUAL: 0 THOUSAND/UL (ref 0–0.1)
BASOPHILS NFR MAR MANUAL: 0 % (ref 0–1)
BILIRUB DIRECT SERPL-MCNC: 4.97 MG/DL (ref 0–0.2)
BILIRUB SERPL-MCNC: 5 MG/DL (ref 0.2–1)
BUN SERPL-MCNC: 10 MG/DL (ref 5–25)
CALCIUM SERPL-MCNC: 7.4 MG/DL (ref 8.3–10.1)
CHLORIDE SERPL-SCNC: 108 MMOL/L (ref 100–108)
CO2 SERPL-SCNC: 22 MMOL/L (ref 21–32)
CREAT SERPL-MCNC: 0.84 MG/DL (ref 0.6–1.3)
EOSINOPHIL # BLD MANUAL: 0 THOUSAND/UL (ref 0–0.4)
EOSINOPHIL NFR BLD MANUAL: 0 % (ref 0–6)
ERYTHROCYTE [DISTWIDTH] IN BLOOD BY AUTOMATED COUNT: 14.6 % (ref 11.6–15.1)
GFR SERPL CREATININE-BSD FRML MDRD: 92 ML/MIN/1.73SQ M
GLUCOSE SERPL-MCNC: 149 MG/DL (ref 65–140)
HCT VFR BLD AUTO: 36.2 % (ref 34.8–46.1)
HGB BLD-MCNC: 12.3 G/DL (ref 11.5–15.4)
LYMPHOCYTES # BLD AUTO: 2.37 THOUSAND/UL (ref 0.6–4.47)
LYMPHOCYTES # BLD AUTO: 33 % (ref 14–44)
MCH RBC QN AUTO: 28.9 PG (ref 26.8–34.3)
MCHC RBC AUTO-ENTMCNC: 34 G/DL (ref 31.4–37.4)
MCV RBC AUTO: 85 FL (ref 82–98)
MONOCYTES # BLD AUTO: 0.36 THOUSAND/UL (ref 0–1.22)
MONOCYTES NFR BLD: 5 % (ref 4–12)
NEUTROPHILS # BLD MANUAL: 4.39 THOUSAND/UL (ref 1.85–7.62)
NEUTS BAND NFR BLD MANUAL: 6 % (ref 0–8)
NEUTS SEG NFR BLD AUTO: 55 % (ref 43–75)
NRBC BLD AUTO-RTO: 0 /100 WBCS
PLATELET # BLD AUTO: 91 THOUSANDS/UL (ref 149–390)
PLATELET BLD QL SMEAR: ABNORMAL
PMV BLD AUTO: 11.3 FL (ref 8.9–12.7)
POTASSIUM SERPL-SCNC: 4.3 MMOL/L (ref 3.5–5.3)
PROT SERPL-MCNC: 4.9 G/DL (ref 6.4–8.2)
RBC # BLD AUTO: 4.26 MILLION/UL (ref 3.81–5.12)
SODIUM SERPL-SCNC: 140 MMOL/L (ref 136–145)
TOTAL CELLS COUNTED SPEC: 100
VARIANT LYMPHS # BLD AUTO: 1 %
WBC # BLD AUTO: 7.19 THOUSAND/UL (ref 4.31–10.16)

## 2018-10-17 PROCEDURE — 85027 COMPLETE CBC AUTOMATED: CPT | Performed by: PHYSICIAN ASSISTANT

## 2018-10-17 PROCEDURE — 99024 POSTOP FOLLOW-UP VISIT: CPT | Performed by: PHYSICIAN ASSISTANT

## 2018-10-17 PROCEDURE — 80074 ACUTE HEPATITIS PANEL: CPT | Performed by: PHYSICIAN ASSISTANT

## 2018-10-17 PROCEDURE — 85007 BL SMEAR W/DIFF WBC COUNT: CPT | Performed by: PHYSICIAN ASSISTANT

## 2018-10-17 PROCEDURE — 82248 BILIRUBIN DIRECT: CPT | Performed by: PHYSICIAN ASSISTANT

## 2018-10-17 PROCEDURE — 80053 COMPREHEN METABOLIC PANEL: CPT | Performed by: PHYSICIAN ASSISTANT

## 2018-10-17 RX ORDER — OXYCODONE HYDROCHLORIDE AND ACETAMINOPHEN 5; 325 MG/1; MG/1
2 TABLET ORAL EVERY 6 HOURS PRN
Status: DISCONTINUED | OUTPATIENT
Start: 2018-10-17 | End: 2018-10-20 | Stop reason: HOSPADM

## 2018-10-17 RX ORDER — OXYCODONE HYDROCHLORIDE AND ACETAMINOPHEN 5; 325 MG/1; MG/1
1 TABLET ORAL EVERY 6 HOURS PRN
Status: DISCONTINUED | OUTPATIENT
Start: 2018-10-17 | End: 2018-10-20 | Stop reason: HOSPADM

## 2018-10-17 RX ADMIN — MORPHINE SULFATE 2 MG: 2 INJECTION, SOLUTION INTRAMUSCULAR; INTRAVENOUS at 02:37

## 2018-10-17 RX ADMIN — ENOXAPARIN SODIUM 40 MG: 40 INJECTION SUBCUTANEOUS at 08:29

## 2018-10-17 RX ADMIN — PIPERACILLIN SODIUM,TAZOBACTAM SODIUM 3.38 G: 3; .375 INJECTION, POWDER, FOR SOLUTION INTRAVENOUS at 02:38

## 2018-10-17 RX ADMIN — FAMOTIDINE 20 MG: 10 INJECTION, SOLUTION INTRAVENOUS at 08:29

## 2018-10-17 RX ADMIN — PIPERACILLIN SODIUM,TAZOBACTAM SODIUM 3.38 G: 3; .375 INJECTION, POWDER, FOR SOLUTION INTRAVENOUS at 15:05

## 2018-10-17 RX ADMIN — FAMOTIDINE 20 MG: 10 INJECTION, SOLUTION INTRAVENOUS at 20:00

## 2018-10-17 RX ADMIN — PIPERACILLIN SODIUM,TAZOBACTAM SODIUM 3.38 G: 3; .375 INJECTION, POWDER, FOR SOLUTION INTRAVENOUS at 08:28

## 2018-10-17 NOTE — PROGRESS NOTES
Progress Note - General Surgery   Jaylene Coello 29 y o  female MRN: [id number]  Unit/Bed#: -Joseph Encounter: [id number]    Assessment:  1  Jaylene Coello is a 29 y o  female POD#1 s/p laparoscopic cholecystectomy    Plan:  - Trend LFTs  Will order a direct bilirubin  AST/ALT trending down  Mild increase of Alk Phos  Increase in Tbili 3 1 -> 5 0  - Will order hepatitis panel  - Continue surgical soft diet  - Pain control PRN  Discontinued IV pain medication, transition to PO  - Encourage ambulation/OOB  - DVT prophylaxis  - IS       Subjective/Objective     Subjective: Lying in bed in no acute distress  Had some solid food this morning which she tolerated without nausea or vomiting  Abdominal pain is well controlled with some residual soreness  Able to ambulate and get OOB  Denies fevers or chills overnight  Able to urinate  Passing flatus  No other complaints  Objective:     Blood pressure 109/67, pulse 86, temperature 97 7 °F (36 5 °C), temperature source Oral, resp  rate 18, height 5' 1" (1 549 m), weight 80 kg (176 lb 5 9 oz), SpO2 98 %  ,Body mass index is 33 32 kg/m²        Intake/Output Summary (Last 24 hours) at 10/17/18 1123  Last data filed at 10/16/18 2345   Gross per 24 hour   Intake              496 ml   Output                0 ml   Net              496 ml       Invasive Devices     Peripheral Intravenous Line            Peripheral IV 10/14/18 Right Antecubital 3 days          Drain            NG/OG/Enteral Tube Orogastric 16 Fr Center mouth 1 day                Physical Exam: /67 (BP Location: Left arm)   Pulse 86   Temp 97 7 °F (36 5 °C) (Oral)   Resp 18   Ht 5' 1" (1 549 m)   Wt 80 kg (176 lb 5 9 oz)   SpO2 98%   BMI 33 32 kg/m²   General appearance: alert and oriented, in no acute distress  Lungs: clear to auscultation bilaterally  Heart: regular rate and rhythm, S1, S2 normal, no murmur, click, rub or gallop  Abdomen: soft, non-distended, active bowel sounds, appropriate incisional tenderness to palpation, four laparoscopic port sites closed and covered with dressing, epigastric incision dressubg with some serosang saturation    Lab, Imaging and other studies:I have personally reviewed pertinent lab results       VTE Pharmacologic Prophylaxis: Enoxaparin (Lovenox)  VTE Mechanical Prophylaxis: sequential compression device    Recent Results (from the past 36 hour(s))   CBC and differential    Collection Time: 10/17/18  4:33 AM   Result Value Ref Range    WBC 7 19 4 31 - 10 16 Thousand/uL    RBC 4 26 3 81 - 5 12 Million/uL    Hemoglobin 12 3 11 5 - 15 4 g/dL    Hematocrit 36 2 34 8 - 46 1 %    MCV 85 82 - 98 fL    MCH 28 9 26 8 - 34 3 pg    MCHC 34 0 31 4 - 37 4 g/dL    RDW 14 6 11 6 - 15 1 %    MPV 11 3 8 9 - 12 7 fL    Platelets 91 (L) 523 - 390 Thousands/uL    nRBC 0 /100 WBCs   Comprehensive metabolic panel    Collection Time: 10/17/18  4:33 AM   Result Value Ref Range    Sodium 140 136 - 145 mmol/L    Potassium 4 3 3 5 - 5 3 mmol/L    Chloride 108 100 - 108 mmol/L    CO2 22 21 - 32 mmol/L    ANION GAP 10 4 - 13 mmol/L    BUN 10 5 - 25 mg/dL    Creatinine 0 84 0 60 - 1 30 mg/dL    Glucose 149 (H) 65 - 140 mg/dL    Calcium 7 4 (L) 8 3 - 10 1 mg/dL     (H) 5 - 45 U/L     (H) 12 - 78 U/L    Alkaline Phosphatase 219 (H) 46 - 116 U/L    Total Protein 4 9 (L) 6 4 - 8 2 g/dL    Albumin 2 1 (L) 3 5 - 5 0 g/dL    Total Bilirubin 5 00 (H) 0 20 - 1 00 mg/dL    eGFR 92 ml/min/1 73sq m   Manual Differential(PHLEBS Do Not Order)    Collection Time: 10/17/18  4:33 AM   Result Value Ref Range    Segmented % 55 43 - 75 %    Bands % 6 0 - 8 %    Lymphocytes % 33 14 - 44 %    Monocytes % 5 4 - 12 %    Eosinophils % 0 0 - 6 %    Basophils % 0 0 - 1 %    Atypical Lymphocytes % 1 (H) <=0 %    Absolute Neutrophils 4 39 1 85 - 7 62 Thousand/uL    Lymphocytes Absolute 2 37 0 60 - 4 47 Thousand/uL    Monocytes Absolute 0 36 0 00 - 1 22 Thousand/uL    Eosinophils Absolute 0 00 0 00 - 0 40 Thousand/uL Basophils Absolute 0 00 0 00 - 0 10 Thousand/uL    Total Counted 100     Platelet Estimate Decreased (A) Adequate

## 2018-10-18 ENCOUNTER — APPOINTMENT (INPATIENT)
Dept: ULTRASOUND IMAGING | Facility: HOSPITAL | Age: 33
DRG: 263 | End: 2018-10-18
Payer: COMMERCIAL

## 2018-10-18 LAB
ALBUMIN SERPL BCP-MCNC: 2.4 G/DL (ref 3.5–5)
ALP SERPL-CCNC: 333 U/L (ref 46–116)
ALT SERPL W P-5'-P-CCNC: 316 U/L (ref 12–78)
ANION GAP SERPL CALCULATED.3IONS-SCNC: 7 MMOL/L (ref 4–13)
AST SERPL W P-5'-P-CCNC: 246 U/L (ref 5–45)
BILIRUB SERPL-MCNC: 7.6 MG/DL (ref 0.2–1)
BUN SERPL-MCNC: 13 MG/DL (ref 5–25)
CALCIUM SERPL-MCNC: 7.4 MG/DL (ref 8.3–10.1)
CHLORIDE SERPL-SCNC: 104 MMOL/L (ref 100–108)
CO2 SERPL-SCNC: 25 MMOL/L (ref 21–32)
CREAT SERPL-MCNC: 0.74 MG/DL (ref 0.6–1.3)
FERRITIN SERPL-MCNC: 1945 NG/ML (ref 8–388)
GFR SERPL CREATININE-BSD FRML MDRD: 108 ML/MIN/1.73SQ M
GLUCOSE SERPL-MCNC: 101 MG/DL (ref 65–140)
HAV IGM SER QL: NORMAL
HBV CORE IGM SER QL: NORMAL
HBV SURFACE AG SER QL: NORMAL
HCV AB SER QL: NORMAL
INR PPP: 1.29 (ref 0.86–1.17)
POTASSIUM SERPL-SCNC: 3.8 MMOL/L (ref 3.5–5.3)
PROT SERPL-MCNC: 5.4 G/DL (ref 6.4–8.2)
PROTHROMBIN TIME: 15.9 SECONDS (ref 11.8–14.2)
SODIUM SERPL-SCNC: 136 MMOL/L (ref 136–145)

## 2018-10-18 PROCEDURE — 93976 VASCULAR STUDY: CPT

## 2018-10-18 PROCEDURE — 86255 FLUORESCENT ANTIBODY SCREEN: CPT | Performed by: PHYSICIAN ASSISTANT

## 2018-10-18 PROCEDURE — 86645 CMV ANTIBODY IGM: CPT | Performed by: PHYSICIAN ASSISTANT

## 2018-10-18 PROCEDURE — 86665 EPSTEIN-BARR CAPSID VCA: CPT | Performed by: PHYSICIAN ASSISTANT

## 2018-10-18 PROCEDURE — 86235 NUCLEAR ANTIGEN ANTIBODY: CPT | Performed by: PHYSICIAN ASSISTANT

## 2018-10-18 PROCEDURE — 86256 FLUORESCENT ANTIBODY TITER: CPT | Performed by: PHYSICIAN ASSISTANT

## 2018-10-18 PROCEDURE — 86663 EPSTEIN-BARR ANTIBODY: CPT | Performed by: PHYSICIAN ASSISTANT

## 2018-10-18 PROCEDURE — 99244 OFF/OP CNSLTJ NEW/EST MOD 40: CPT | Performed by: INTERNAL MEDICINE

## 2018-10-18 PROCEDURE — 83516 IMMUNOASSAY NONANTIBODY: CPT | Performed by: PHYSICIAN ASSISTANT

## 2018-10-18 PROCEDURE — 86038 ANTINUCLEAR ANTIBODIES: CPT | Performed by: PHYSICIAN ASSISTANT

## 2018-10-18 PROCEDURE — 82103 ALPHA-1-ANTITRYPSIN TOTAL: CPT | Performed by: PHYSICIAN ASSISTANT

## 2018-10-18 PROCEDURE — 82784 ASSAY IGA/IGD/IGG/IGM EACH: CPT | Performed by: PHYSICIAN ASSISTANT

## 2018-10-18 PROCEDURE — 82728 ASSAY OF FERRITIN: CPT | Performed by: PHYSICIAN ASSISTANT

## 2018-10-18 PROCEDURE — 86644 CMV ANTIBODY: CPT | Performed by: PHYSICIAN ASSISTANT

## 2018-10-18 PROCEDURE — 86664 EPSTEIN-BARR NUCLEAR ANTIGEN: CPT | Performed by: PHYSICIAN ASSISTANT

## 2018-10-18 PROCEDURE — 80053 COMPREHEN METABOLIC PANEL: CPT | Performed by: PHYSICIAN ASSISTANT

## 2018-10-18 PROCEDURE — 85610 PROTHROMBIN TIME: CPT | Performed by: PHYSICIAN ASSISTANT

## 2018-10-18 PROCEDURE — 99024 POSTOP FOLLOW-UP VISIT: CPT | Performed by: PHYSICIAN ASSISTANT

## 2018-10-18 PROCEDURE — 82390 ASSAY OF CERULOPLASMIN: CPT | Performed by: PHYSICIAN ASSISTANT

## 2018-10-18 RX ORDER — IBUPROFEN 400 MG/1
800 TABLET ORAL EVERY 6 HOURS PRN
Status: DISCONTINUED | OUTPATIENT
Start: 2018-10-18 | End: 2018-10-18

## 2018-10-18 RX ORDER — GUAIFENESIN/DEXTROMETHORPHAN 100-10MG/5
10 SYRUP ORAL EVERY 4 HOURS PRN
Status: DISCONTINUED | OUTPATIENT
Start: 2018-10-18 | End: 2018-10-20 | Stop reason: HOSPADM

## 2018-10-18 RX ORDER — TEMAZEPAM 15 MG/1
15 CAPSULE ORAL
Status: DISCONTINUED | OUTPATIENT
Start: 2018-10-18 | End: 2018-10-20 | Stop reason: HOSPADM

## 2018-10-18 RX ORDER — IBUPROFEN 400 MG/1
800 TABLET ORAL EVERY 6 HOURS PRN
Status: DISCONTINUED | OUTPATIENT
Start: 2018-10-18 | End: 2018-10-20 | Stop reason: HOSPADM

## 2018-10-18 RX ADMIN — LIDOCAINE HYDROCHLORIDE 10 ML: 20 SOLUTION ORAL; TOPICAL at 20:10

## 2018-10-18 RX ADMIN — GUAIFENESIN AND DEXTROMETHORPHAN 10 ML: 100; 10 SYRUP ORAL at 15:50

## 2018-10-18 RX ADMIN — TEMAZEPAM 15 MG: 15 CAPSULE ORAL at 22:18

## 2018-10-18 RX ADMIN — GUAIFENESIN AND DEXTROMETHORPHAN 10 ML: 100; 10 SYRUP ORAL at 10:48

## 2018-10-18 RX ADMIN — IBUPROFEN 800 MG: 400 TABLET ORAL at 17:40

## 2018-10-18 RX ADMIN — ENOXAPARIN SODIUM 40 MG: 40 INJECTION SUBCUTANEOUS at 10:29

## 2018-10-18 RX ADMIN — FAMOTIDINE 20 MG: 10 INJECTION, SOLUTION INTRAVENOUS at 17:41

## 2018-10-18 RX ADMIN — FAMOTIDINE 20 MG: 10 INJECTION, SOLUTION INTRAVENOUS at 10:23

## 2018-10-18 RX ADMIN — GUAIFENESIN AND DEXTROMETHORPHAN 10 ML: 100; 10 SYRUP ORAL at 20:10

## 2018-10-18 NOTE — CONSULTS
Consultation - [address] [facility]  Antolin Zarco 29 y o  female MRN: [id number]  Unit/Bed#: -01 Encounter: [id number]         Reason for Consult / Principal Problem:  Persistent transaminitis post cholecystectomy    HPI: Malissa Red is a 27-year-old female who presented to the emergency room on 10/14 for abdominal pain, nausea, vomiting, chills and diarrhea  Patient was previously seen in the ED 3 days prior, it was diagnosed with a gastroenteritis  For the past month, she has had episodes of "stomach upset" after eating that would be relieved by vomiting  Overall, the patient reports that she has a "sensitive stomach" since her  3 years ago  Her symptoms throughout this month she attributed to her chronic GI symptoms that fluctuate  Because of increasing pain, she decided to be seen again in the ED  On admission, patient's liver enzymes were elevated as follows:  , , alk-phos 212, and total bilirubin 2 9  CT of the abdomen pelvis with contrast revealed no radiopaque gallstones  There was mild gallbladder wall hyperemia  Minimal free fluid in the cul de sac pooling within the gallbladder fossa as well  Follow-up right upper quadrant ultrasound revealed a thickened gallbladder wall up to 11 mm  Again, no gallstones seen  Because her liver enzymes continue to rise, she underwent MRCP  No sign of choledocholithiasis  Again, similar gallbladder findings consistent with acalculous cholecystitis  Right upper quadrant edema noted  She underwent cholecystectomy on 10/16  Intraop cholangiogram was negative  Patient's bilirubin continues to rise  On admission at 2 9, and now at 7 6  It is predominantly direct elevation  Her liver enzymes have variable trend  Currently , , alk-phos 333  Blood cultures have been negative  Patient relays to me that she was exposed to hepatitis C when sharing a needle in    Patient has also had tattoos that were not performed in a tattoo parlor  Otherwise, she denies liver disease in her family to her knowledge  She denies new medications or herbals  Her liver enzymes in 2017 were normal   Otherwise, no other comparison  She currently denies any abdominal pain, nausea or vomiting  Her appetite is slowly returning to normal          Review of Systems:    CONSTITUTIONAL: Denies any fever, chills, or rigors  Good appetite, and no recent weight loss  HEENT: No earache or tinnitus  Denies hearing loss or visual disturbances  CARDIOVASCULAR: No chest pain or palpitations  RESPIRATORY: Denies any cough, hemoptysis, shortness of breath or dyspnea on exertion  GASTROINTESTINAL: As noted in the History of Present Illness  GENITOURINARY: No problems with urination  Denies any hematuria or dysuria  NEUROLOGIC: No dizziness or vertigo, denies headaches  MUSCULOSKELETAL: Denies any muscle or joint pain  SKIN: Denies skin rashes or itching  ENDOCRINE: Denies excessive thirst  Denies intolerance to heat or cold  PSYCHOSOCIAL: Denies depression or anxiety  Denies any recent memory loss  Historical Information   History reviewed  No pertinent past medical history  Past Surgical History:   Procedure Laterality Date     SECTION      CHOLECYSTECTOMY LAPAROSCOPIC N/A 10/16/2018    Procedure: CHOLECYSTECTOMY LAPAROSCOPIC w/ IOC;  Surgeon: Madiha Young MD;  Location: [facility] [place] OR;  Service: General     Social History   History   Alcohol Use No     History   Drug Use No     History   Smoking Status    Current Every Day Smoker    Packs/day: 1 00   Smokeless Tobacco    Never Used     History reviewed  No pertinent family history       Meds/Allergies     Current Facility-Administered Medications   Medication Dose Route Frequency    acetaminophen (TYLENOL) tablet 650 mg  650 mg Oral Q6H PRN    dextromethorphan-guaiFENesin (ROBITUSSIN DM)  mg/5 mL oral syrup 10 mL  10 mL Oral Q4H PRN    enoxaparin (LOVENOX) subcutaneous injection 40 mg  40 mg Subcutaneous Daily    famotidine (PEPCID) injection 20 mg  20 mg Intravenous BID    ibuprofen (MOTRIN) tablet 800 mg  800 mg Oral Q6H PRN    influenza vaccine, quadrivalent (FLULAVAL) IM injection 0 5 mL  0 5 mL Intramuscular Prior to discharge    nicotine (NICODERM CQ) 14 mg/24hr TD 24 hr patch 14 mg  14 mg Transdermal Daily    ondansetron (ZOFRAN) injection 4 mg  4 mg Intravenous Q6H PRN    oxyCODONE-acetaminophen (PERCOCET) 5-325 mg per tablet 1 tablet  1 tablet Oral Q6H PRN    oxyCODONE-acetaminophen (PERCOCET) 5-325 mg per tablet 2 tablet  2 tablet Oral Q6H PRN    phenol (CHLORASEPTIC) 1 4 % mucosal liquid 1 spray  1 spray Mouth/Throat Q2H PRN    temazepam (RESTORIL) capsule 15 mg  15 mg Oral HS PRN       No Known Allergies      Objective     Blood pressure 115/68, pulse 87, temperature 98 6 °F (37 °C), temperature source Oral, resp  rate 18, height 5' 1" (1 549 m), weight 80 kg (176 lb 5 9 oz), SpO2 91 %  Intake/Output Summary (Last 24 hours) at 10/18/18 1028  Last data filed at 10/18/18 0500   Gross per 24 hour   Intake             1020 ml   Output                0 ml   Net             1020 ml         PHYSICAL EXAM:      General Appearance:   Alert and oriented x 3  Cooperative, and in no respiratory distress   HEENT:   Normocephalic, atraumatic, scleral icterus noted      Neck:  Supple, symmetrical, trachea midline   Lungs:   Clear to auscultation bilaterally; no rales, rhonchi or wheezing; respirations unlabored    Heart[de-identified]   S1 and S2 normal; regular rate and rhythm; no murmur, rub, or gallop     Abdomen:   Soft, non-tender, non-distended; normal bowel sounds; no masses, no organomegaly    Genitalia:   Deferred    Rectal:   Deferred    Extremities:  No cyanosis, clubbing or edema    Pulses:  2+ and symmetric all extremities    Skin:  Skin color, texture, turgor normal, no rashes or lesions    Lymph nodes:  No palpable cervical or supraclavicular lymphadenopathy        Lab Results:     Results from last 7 days  Lab Units 10/17/18  0433  10/11/18  1605   WBC Thousand/uL 7 19  < > 8 21   HEMOGLOBIN g/dL 12 3  < > 14 7   HEMATOCRIT % 36 2  < > 42 6   PLATELETS Thousands/uL 91*  < > 290   NEUTROS PCT %  --   --  82*   LYMPHS PCT %  --   --  14   LYMPHO PCT % 33  < >  --    MONOS PCT %  --   --  3*   MONO PCT MAN % 5  < >  --    EOS PCT %  --   --  0   EOSINO PCT MANUAL % 0  < >  --    < > = values in this interval not displayed  Results from last 7 days  Lab Units 10/18/18  0858   SODIUM mmol/L 136   POTASSIUM mmol/L 3 8   CHLORIDE mmol/L 104   CO2 mmol/L 25   BUN mg/dL 13   CREATININE mg/dL 0 74   CALCIUM mg/dL 7 4*   ALK PHOS U/L 333*   ALT U/L 316*   AST U/L 246*           Results from last 7 days  Lab Units 10/14/18  0515   LIPASE u/L 145       Imaging Studies: I have personally reviewed pertinent imaging studies  Xr Cholangiogram Intraoperative    Result Date: 10/16/2018  Impression: No filling defects  Please see procedure report for further details  Workstation performed: [id number]     Mri Abdomen Wo Contrast And Mrcp    Result Date: 10/15/2018  Impression: No evidence of choledocholithiasis  Marked gallbladder wall edema without calculi likely representing acalculus cholecystitis  Associated right upper quadrant edema  Workstation performed: [id number]     Us Right Upper Quadrant    Result Date: 10/14/2018  Impression: Marked gallbladder wall thickening with no shadowing stones or biliary dilatation  Workstation performed: [id number]     Ct Abdomen Pelvis With Contrast    Result Date: 10/14/2018  Impression: No radiopaque gallstones  Mild gallbladder wall hyperemia without thickening likely reactive  Follow-up with gallbladder sonogram if clinically warranted  Minimal free fluid in the cul-de-sac and pooling within the gallbladder fossa  Fluid within physiologic limits    This may be sequela of leaking or ruptured right ovarian 3 cm dominant follicle  Overall, these findings are within normal limits for premenopausal patient  Nonobstructive bowel pattern suggestive of mild gastroenteritis  No bowel obstruction  Otherwise, no acute intra-abdominal or intrapelvic process  Mild hepatic steatosis  Workstation performed: [id number]       ASSESSMENT and PLAN:      1) Mixed pattern transaminitis - Patient with persistently elevated LFTs  Her bilirubin continues to trend upward  She had no signs of gallstones on CT, right upper quadrant ultrasound and MRCP  She did have significant hyperemia and edema in the right upper quadrant  Her liver enzymes were normal in 2017  This elevation may be secondary to inflammation in the right upper quadrant given possible long duration of gallbladder disease  If she were exposed to hepatitis-C, this would not be acute as this happened in 2012  Patient is agreeable to have further testing   - Follow-up acute hepatitis panel  - Would also obtain other liver workup including ASMA, AMA, KIRSTEN, ferritin, EBV, ceruloplasmin, etc    - Continue to trend liver enzymes and INR  - Further recommendations based on above findings, ideally would like to see patient's LFTs trend downward    The patient was seen and examined by Dr Enrique Price, all spann medical decisions were made with Dr Enrique Price  Thank you for allowing us to participate in the care of this pleasant patient  We will follow up with you closely

## 2018-10-18 NOTE — PROGRESS NOTES
Progress Note - General Surgery   Victorina Floyd 29 y o  female MRN: [id number]  Unit/Bed#: -01 Encounter: [id number]    Assessment/Plan  POD 2 Laparoscopic Cholecystectomy, IOC unremarkable, MRCP unremarkable  Elevated Liver Enzymes continue  Hyperbilirubinemia post op cont to rise     -consult GI who plans to add blood work and wait for hepatitis panel, pt agrees to stay one more night    -cont low fat diet  -ambulate in the halls  -encourage pain meds, add ibuprofen,   -add Restoril for sleep  -ice packs,  -add robitussin for cough, offered nicotine patch again    -discussed lifting/getting OOB/ activity instructions  Chief Complaint: I am so frustrated and want to leave  I have 20/10 pain in bellybutton but I am not taking pain meds  I am frustrated but willing to see what GI has to say  I can not sleep  I am coughing a lot and it really hurts  I am a smoker but I don't want the patch  Objective Directed Exam:  Sitting chair and tearful  Nonproductive loose cough  Lungs are clear, RRR, abd is soft, tenderness umbilical incision  No hematoma  All incisions are healing well with no hematomas, erythema or drainage  Calves are nontender  Blood pressure 115/68, pulse 87, temperature 98 6 °F (37 °C), temperature source Oral, resp  rate 18, height 5' 1" (1 549 m), weight 80 kg (176 lb 5 9 oz), SpO2 91 %  ,Body mass index is 33 32 kg/m²        Intake/Output Summary (Last 24 hours) at 10/18/18 1029  Last data filed at 10/18/18 0500   Gross per 24 hour   Intake             1020 ml   Output                0 ml   Net             1020 ml       Invasive Devices     Peripheral Intravenous Line            Peripheral IV 10/14/18 Right Antecubital 4 days          Drain            NG/OG/Enteral Tube Orogastric 16 Fr Center mouth 2 days                Physical Exam:   General Appearance:    Alert and orientated x 3, cooperative, no distress   Lungs:     Clear to auscultation bilaterally, respirations unlabored    Heart:    Regular rate and rhythm   Abdomen:     As above     Wound/Dressing:  C/d/i,       Extremities:   Extremities normal,  no cyanosis or edema   Pulses:   2+ and symmetric all extremities, no calf tenderness   Skin:   Skin color, texture, turgor normal, no rashes or lesions   Neurologic:   CNII-XII intact, normal strength, sensation and reflexes     Throughout, affect appropriate                           Labs:   CBC with diff: Lab Results   Component Value Date    WBC 7 19 10/17/2018    HGB 12 3 10/17/2018    HCT 36 2 10/17/2018    MCV 85 10/17/2018    PLT 91 (L) 10/17/2018    MCH 28 9 10/17/2018    MCHC 34 0 10/17/2018    RDW 14 6 10/17/2018    MPV 11 3 10/17/2018    NRBC 0 10/17/2018   ,   BMP/CMP:  Lab Results   Component Value Date     10/18/2018    K 3 8 10/18/2018     10/18/2018    CO2 25 10/18/2018    BUN 13 10/18/2018    CREATININE 0 74 10/18/2018    CALCIUM 7 4 (L) 10/18/2018     (H) 10/18/2018     (H) 10/18/2018    ALKPHOS 333 (H) 10/18/2018    EGFR 108 10/18/2018   ,   Lipid Panel: No results found for: CHOL,   Coags: No results found for: PT, PTT, INR,     Blood Culture:   Lab Results   Component Value Date    BLOODCX No Growth at 72 hrs  10/14/2018   ,   Urinalysis: Lab Results   Component Value Date    COLORU Yellow 10/14/2018    CLARITYU Clear 10/14/2018    SPECGRAV 1 010 10/14/2018    PHUR 5 5 10/14/2018    LEUKOCYTESUR Negative 10/14/2018    NITRITE Negative 10/14/2018    PROTEINUA Trace (A) 10/14/2018    GLUCOSEU Negative 10/14/2018    KETONESU Trace (A) 10/14/2018    BILIRUBINUR Moderate (A) 10/14/2018    BLOODU Negative 10/14/2018   ,   Urine Culture: No results found for: URINECX,   Wound Culure: No results found for: WOUNDCULT      Imaging: Xr Cholangiogram Intraoperative    Result Date: 10/16/2018  Impression: No filling defects  Please see procedure report for further details   Workstation performed: [id number]     Mri Abdomen Wo Contrast And Mrcp    Result Date: 10/15/2018  Impression: No evidence of choledocholithiasis  Marked gallbladder wall edema without calculi likely representing acalculus cholecystitis  Associated right upper quadrant edema  Workstation performed: [id number]     Us Right Upper Quadrant    Result Date: 10/14/2018  Impression: Marked gallbladder wall thickening with no shadowing stones or biliary dilatation  Workstation performed: [id number]     Ct Abdomen Pelvis With Contrast    Result Date: 10/14/2018  Impression: No radiopaque gallstones  Mild gallbladder wall hyperemia without thickening likely reactive  Follow-up with gallbladder sonogram if clinically warranted  Minimal free fluid in the cul-de-sac and pooling within the gallbladder fossa  Fluid within physiologic limits  This may be sequela of leaking or ruptured right ovarian 3 cm dominant follicle  Overall, these findings are within normal limits for premenopausal patient  Nonobstructive bowel pattern suggestive of mild gastroenteritis  No bowel obstruction  Otherwise, no acute intra-abdominal or intrapelvic process  Mild hepatic steatosis   Workstation performed: [id number]         Dorris Halsted, PA-C  10/18/2018

## 2018-10-19 LAB
A1AT SERPL-MCNC: 178 MG/DL (ref 90–200)
ACTIN IGG SERPL-ACNC: 10 UNITS (ref 0–19)
ALBUMIN SERPL BCP-MCNC: 2.3 G/DL (ref 3.5–5)
ALP SERPL-CCNC: 398 U/L (ref 46–116)
ALT SERPL W P-5'-P-CCNC: 330 U/L (ref 12–78)
ANION GAP SERPL CALCULATED.3IONS-SCNC: 7 MMOL/L (ref 4–13)
AST SERPL W P-5'-P-CCNC: 209 U/L (ref 5–45)
BACTERIA BLD CULT: NORMAL
BACTERIA BLD CULT: NORMAL
BILIRUB DIRECT SERPL-MCNC: 6.47 MG/DL (ref 0–0.2)
BILIRUB SERPL-MCNC: 7.7 MG/DL (ref 0.2–1)
BUN SERPL-MCNC: 11 MG/DL (ref 5–25)
CALCIUM SERPL-MCNC: 7.4 MG/DL (ref 8.3–10.1)
CERULOPLASMIN SERPL-MCNC: 25.1 MG/DL (ref 19–39)
CHLORIDE SERPL-SCNC: 104 MMOL/L (ref 100–108)
CMV IGG SERPL IA-ACNC: <0.6 U/ML (ref 0–0.59)
CMV IGM SERPL IA-ACNC: <30 AU/ML (ref 0–29.9)
CO2 SERPL-SCNC: 27 MMOL/L (ref 21–32)
CREAT SERPL-MCNC: 0.7 MG/DL (ref 0.6–1.3)
EBV EA IGG SER-ACNC: 14.4 U/ML (ref 0–8.9)
EBV NA IGG SER IA-ACNC: 344 U/ML (ref 0–17.9)
EBV PATRN SPEC IB-IMP: ABNORMAL
EBV VCA IGG SER IA-ACNC: >600 U/ML (ref 0–17.9)
EBV VCA IGM SER IA-ACNC: <36 U/ML (ref 0–35.9)
ENDOMYSIUM IGA SER QL: NEGATIVE
FERRITIN SERPL-MCNC: 1682 NG/ML (ref 8–388)
GFR SERPL CREATININE-BSD FRML MDRD: 115 ML/MIN/1.73SQ M
GLIADIN PEPTIDE IGA SER-ACNC: 9 UNITS (ref 0–19)
GLIADIN PEPTIDE IGG SER-ACNC: 2 UNITS (ref 0–19)
GLUCOSE SERPL-MCNC: 111 MG/DL (ref 65–140)
IGA SERPL-MCNC: 354 MG/DL (ref 87–352)
INR PPP: 1.22 (ref 0.86–1.17)
IRON SATN MFR SERPL: 25 %
IRON SERPL-MCNC: 43 UG/DL (ref 50–170)
MITOCHONDRIA M2 IGG SER-ACNC: 1.7 UNITS (ref 0–20)
POTASSIUM SERPL-SCNC: 3.8 MMOL/L (ref 3.5–5.3)
PROT SERPL-MCNC: 5.3 G/DL (ref 6.4–8.2)
PROTHROMBIN TIME: 15.3 SECONDS (ref 11.8–14.2)
RYE IGE QN: NEGATIVE
SODIUM SERPL-SCNC: 138 MMOL/L (ref 136–145)
TIBC SERPL-MCNC: 173 UG/DL (ref 250–450)
TTG IGA SER-ACNC: <2 U/ML (ref 0–3)
TTG IGG SER-ACNC: <2 U/ML (ref 0–5)

## 2018-10-19 PROCEDURE — 83550 IRON BINDING TEST: CPT | Performed by: PHYSICIAN ASSISTANT

## 2018-10-19 PROCEDURE — 87522 HEPATITIS C REVRS TRNSCRPJ: CPT | Performed by: PHYSICIAN ASSISTANT

## 2018-10-19 PROCEDURE — 80053 COMPREHEN METABOLIC PANEL: CPT | Performed by: PHYSICIAN ASSISTANT

## 2018-10-19 PROCEDURE — 83540 ASSAY OF IRON: CPT | Performed by: PHYSICIAN ASSISTANT

## 2018-10-19 PROCEDURE — 99225 PR SBSQ OBSERVATION CARE/DAY 25 MINUTES: CPT | Performed by: INTERNAL MEDICINE

## 2018-10-19 PROCEDURE — 82728 ASSAY OF FERRITIN: CPT | Performed by: PHYSICIAN ASSISTANT

## 2018-10-19 PROCEDURE — 85610 PROTHROMBIN TIME: CPT | Performed by: PHYSICIAN ASSISTANT

## 2018-10-19 PROCEDURE — 99024 POSTOP FOLLOW-UP VISIT: CPT | Performed by: PHYSICIAN ASSISTANT

## 2018-10-19 PROCEDURE — 82248 BILIRUBIN DIRECT: CPT | Performed by: PHYSICIAN ASSISTANT

## 2018-10-19 RX ADMIN — TEMAZEPAM 15 MG: 15 CAPSULE ORAL at 22:32

## 2018-10-19 RX ADMIN — GUAIFENESIN AND DEXTROMETHORPHAN 10 ML: 100; 10 SYRUP ORAL at 22:37

## 2018-10-19 RX ADMIN — GUAIFENESIN AND DEXTROMETHORPHAN 10 ML: 100; 10 SYRUP ORAL at 16:50

## 2018-10-19 RX ADMIN — LIDOCAINE HYDROCHLORIDE 10 ML: 20 SOLUTION ORAL; TOPICAL at 19:32

## 2018-10-19 RX ADMIN — FAMOTIDINE 20 MG: 10 INJECTION, SOLUTION INTRAVENOUS at 20:00

## 2018-10-19 RX ADMIN — GUAIFENESIN AND DEXTROMETHORPHAN 10 ML: 100; 10 SYRUP ORAL at 08:38

## 2018-10-19 RX ADMIN — FAMOTIDINE 20 MG: 10 INJECTION, SOLUTION INTRAVENOUS at 08:38

## 2018-10-19 RX ADMIN — IBUPROFEN 800 MG: 400 TABLET ORAL at 08:38

## 2018-10-19 RX ADMIN — ENOXAPARIN SODIUM 40 MG: 40 INJECTION SUBCUTANEOUS at 08:38

## 2018-10-19 NOTE — PROGRESS NOTES
Progress Note - General Surgery   Maru Solomon 29 y o  female MRN: [id number]  Unit/Bed#: -01 Encounter: [id number]    Assessment/Plan  POD 3 Laparoscopic Cholecystectomy, IOC and MRCP unremarkable  Elevated Liver Enzymes, continue to be elevated with increase in ALT and ALK PHOS, Total bilirubin  Elevated direct bilirubin, pending  Hepatitis panel, Celiac, EBV, Anti-smooth muscle antibody and Antimicrobial antibody are still in process,   -await results of direct bili, and other labs  -cont diet  -cont ambulation  -cont Robitussin  -cont ibuprofen      Chief Complaint: I slept last night with the Restoril  I feel pretty good  No complaints of pain, bowels are moving  No nausea with eating  Urine is yellow  Objective Directed Exam:  Looks well  Lungs clear, RRR, Abd: soft nontender, NBS  Incisions healing well  No hematoma or drainage  No jaundice, sclera anicterus  Blood pressure 121/65, pulse 74, temperature 98 2 °F (36 8 °C), temperature source Oral, resp  rate 18, height 5' 1" (1 549 m), weight 80 kg (176 lb 5 9 oz), SpO2 94 %  ,Body mass index is 33 32 kg/m²        Intake/Output Summary (Last 24 hours) at 10/19/18 1138  Last data filed at 10/18/18 2148   Gross per 24 hour   Intake              700 ml   Output                0 ml   Net              700 ml       Invasive Devices     Peripheral Intravenous Line            Peripheral IV 10/14/18 Right Antecubital 5 days          Drain            NG/OG/Enteral Tube Orogastric 16 Fr Center mouth 3 days                Physical Exam:   General Appearance:    Alert and orientated x 3, cooperative, no distress   Lungs:     Clear to auscultation bilaterally, respirations unlabored    Heart:    Regular rate and rhythm   Abdomen:     As above     Wound/Dressing:  C/d/i,       Extremities:   Extremities normal,  no cyanosis or edema   Pulses:   2+ and symmetric all extremities, no calf tenderness   Skin:   Skin color, texture, turgor normal, no rashes or lesions   Neurologic:   CNII-XII intact, normal strength, sensation and reflexes     Throughout, affect appropriate                           Labs:   CBC with diff: Lab Results   Component Value Date    WBC 7 19 10/17/2018    HGB 12 3 10/17/2018    HCT 36 2 10/17/2018    MCV 85 10/17/2018    PLT 91 (L) 10/17/2018    MCH 28 9 10/17/2018    MCHC 34 0 10/17/2018    RDW 14 6 10/17/2018    MPV 11 3 10/17/2018    NRBC 0 10/17/2018   ,   BMP/CMP:  Lab Results   Component Value Date     10/19/2018    K 3 8 10/19/2018     10/19/2018    CO2 27 10/19/2018    BUN 11 10/19/2018    CREATININE 0 70 10/19/2018    CALCIUM 7 4 (L) 10/19/2018     (H) 10/19/2018     (H) 10/19/2018    ALKPHOS 398 (H) 10/19/2018    EGFR 115 10/19/2018   ,   Lipid Panel: No results found for: CHOL,   Coags:   Lab Results   Component Value Date    INR 1 22 (H) 10/19/2018   ,     Blood Culture:   Lab Results   Component Value Date    BLOODCX No Growth After 4 Days  10/14/2018   ,   Urinalysis: Lab Results   Component Value Date    COLORU Yellow 10/14/2018    CLARITYU Clear 10/14/2018    SPECGRAV 1 010 10/14/2018    PHUR 5 5 10/14/2018    LEUKOCYTESUR Negative 10/14/2018    NITRITE Negative 10/14/2018    PROTEINUA Trace (A) 10/14/2018    GLUCOSEU Negative 10/14/2018    KETONESU Trace (A) 10/14/2018    BILIRUBINUR Moderate (A) 10/14/2018    BLOODU Negative 10/14/2018   ,   Urine Culture: No results found for: URINECX,   Wound Culure: No results found for: WOUNDCULT      Imaging: Xr Cholangiogram Intraoperative    Result Date: 10/16/2018  Impression: No filling defects  Please see procedure report for further details  Workstation performed: [id number]     Mri Abdomen Wo Contrast And Mrcp    Result Date: 10/15/2018  Impression: No evidence of choledocholithiasis  Marked gallbladder wall edema without calculi likely representing acalculus cholecystitis  Associated right upper quadrant edema   Workstation performed: [id number]     Us Right Upper Quadrant    Result Date: 10/14/2018  Impression: Marked gallbladder wall thickening with no shadowing stones or biliary dilatation  Workstation performed: [id number]     Ct Abdomen Pelvis With Contrast    Result Date: 10/14/2018  Impression: No radiopaque gallstones  Mild gallbladder wall hyperemia without thickening likely reactive  Follow-up with gallbladder sonogram if clinically warranted  Minimal free fluid in the cul-de-sac and pooling within the gallbladder fossa  Fluid within physiologic limits  This may be sequela of leaking or ruptured right ovarian 3 cm dominant follicle  Overall, these findings are within normal limits for premenopausal patient  Nonobstructive bowel pattern suggestive of mild gastroenteritis  No bowel obstruction  Otherwise, no acute intra-abdominal or intrapelvic process  Mild hepatic steatosis   Workstation performed: [id number]     Us Liver Doppler Only    Result Date: 10/19/2018  Impression: Hepatomegaly with increased echogenicity of the liver parenchyma related to diffuse hepatic steatosis Patent the portal vein with the hepatoptal flow Patent hepatic artery Workstation performed: [id number]         Baron Argueta PA-C  10/19/2018

## 2018-10-19 NOTE — SOCIAL WORK
Cm met with patient at bedside, patient alert and oriented during interview  Patient reports residing in a private home with her child and SO  Patient reports being completely independent with ADL's, no dme use, vna, str or MH/SA  Patient reports previously using urgent care for follow up, states she is not a "doctor person" and does not get sick sandra  Patient mentioned she will follow up with [employer] regarding her medical assistance application  Patient mentioned her insurance recently lapsed she was not aware of it  Cm provided patient with [email or web address] telephone number for follow up and the [employer] number  Patient reports no additional needs at this time  CM reviewed discharge planning process including the following: identifying help at home, patient preference for discharge planning needs, pharmacy preference, and availability of treatment team to discuss questions or concerns patient and/or family may have regarding understanding medications and recognizing signs and symptoms once discharged  CM also encouraged patient to follow up with all recommended appointments after discharge  Patient advised of importance for patient and family to participate in managing patients medical well being  CM name and role reviewed  Discharge Checklist reviewed and CM will continue to monitor for progress toward discharge goals in nursing and provider rounds

## 2018-10-19 NOTE — PROGRESS NOTES
Pt has no IV access, very poor IV accessibility  Attempts x 3 nurses with accuvein unsuccessful  Notified MD and ED Charge who will attempt to place IV at earliest convenience

## 2018-10-19 NOTE — NURSING NOTE
Cleansed surgical wound with saline patted dry, and left open to air  Patient tolerated well    Bouchra Mendez RN

## 2018-10-19 NOTE — PROGRESS NOTES
Progress Note - Lizet Valentine 29 y o  female MRN: [id number]    Unit/Bed#: -01 Encounter: [id number]        Subjective:     She continues to deny abdominal pain, nausea or vomiting  ROS: As noted in the HPI, otherwise all others negative  Objective:     Vitals: Blood pressure 121/65, pulse 74, temperature 98 2 °F (36 8 °C), temperature source Oral, resp  rate 18, height 5' 1" (1 549 m), weight 80 kg (176 lb 5 9 oz), SpO2 94 %  ,Body mass index is 33 32 kg/m²  Intake/Output Summary (Last 24 hours) at 10/19/18 1335  Last data filed at 10/18/18 2148   Gross per 24 hour   Intake              420 ml   Output                0 ml   Net              420 ml       Physical Exam:     General Appearance: Alert and oriented x 3  In no respiratory distress  Lungs: Clear to auscultation bilaterally, no rales or rhonchi  Heart: Regular rate and rhythm, S1, S2 normal, no murmur, click, rub or gallop  Abdomen: Soft, non-tender, non-distended; bowel sounds normal; no masses or no organomegaly  Extremities: No cyanosis, edema    Invasive Devices     Peripheral Intravenous Line            Peripheral IV 10/14/18 Right Antecubital 5 days          Drain            NG/OG/Enteral Tube Orogastric 16 Fr Center mouth 3 days                Lab Results:    Results from last 7 days  Lab Units 10/17/18  0433   WBC Thousand/uL 7 19   HEMOGLOBIN g/dL 12 3   HEMATOCRIT % 36 2   PLATELETS Thousands/uL 91*   LYMPHO PCT % 33   MONO PCT MAN % 5   EOSINO PCT MANUAL % 0       Results from last 7 days  Lab Units 10/19/18  0957   SODIUM mmol/L 138   POTASSIUM mmol/L 3 8   CHLORIDE mmol/L 104   CO2 mmol/L 27   BUN mg/dL 11   CREATININE mg/dL 0 70   CALCIUM mg/dL 7 4*   ALK PHOS U/L 398*   ALT U/L 330*   AST U/L 209*       Results from last 7 days  Lab Units 10/19/18  0957   INR  1 22*       Results from last 7 days  Lab Units 10/14/18  0515   LIPASE u/L 145       Imaging Studies: I have personally reviewed pertinent imaging studies      Roxanne Nuñez Cholangiogram Intraoperative    Result Date: 10/16/2018  Impression: No filling defects  Please see procedure report for further details  Workstation performed: [id number]     Mri Abdomen Wo Contrast And Mrcp    Result Date: 10/15/2018  Impression: No evidence of choledocholithiasis  Marked gallbladder wall edema without calculi likely representing acalculus cholecystitis  Associated right upper quadrant edema  Workstation performed: [id number]     Us Right Upper Quadrant    Result Date: 10/14/2018  Impression: Marked gallbladder wall thickening with no shadowing stones or biliary dilatation  Workstation performed: [id number]     Ct Abdomen Pelvis With Contrast    Result Date: 10/14/2018  Impression: No radiopaque gallstones  Mild gallbladder wall hyperemia without thickening likely reactive  Follow-up with gallbladder sonogram if clinically warranted  Minimal free fluid in the cul-de-sac and pooling within the gallbladder fossa  Fluid within physiologic limits  This may be sequela of leaking or ruptured right ovarian 3 cm dominant follicle  Overall, these findings are within normal limits for premenopausal patient  Nonobstructive bowel pattern suggestive of mild gastroenteritis  No bowel obstruction  Otherwise, no acute intra-abdominal or intrapelvic process  Mild hepatic steatosis  Workstation performed: [id number]     Us Liver Doppler Only    Result Date: 10/19/2018  Impression: Hepatomegaly with increased echogenicity of the liver parenchyma related to diffuse hepatic steatosis Patent the portal vein with the hepatoptal flow Patent hepatic artery Workstation performed: [id number]         Assessment and Plan:     1) Mixed pattern transaminitis - Bilirubin only slightly elevated from yesterday at 7 7  Remainder of LFTs again with variable trend, and still elevated  INR slightly elevated at 1 22  KIRSTEN, anti-1-antitrypsin, ceruloplasmin, CMV and acute hepatitis panel are negative   Liver doppler normal  Her liver enzymes were normal in 2017  This elevation may be secondary to inflammation in the right upper quadrant given possible long duration of gallbladder disease, ischemia or underlying liver disease   It appears her LFTs are peaking    - Follow-up remainder of labs   - Continue to monitor CMP and INR daily   - Again, would ideally like to see her liver enzymes trend downward prior to admission   - Outpatient follow-up with us

## 2018-10-19 NOTE — UTILIZATION REVIEW
Continued Stay Review    Date: 10/19    Vital Signs: /65 (BP Location: Left arm)   Pulse 74   Temp 98 2 °F (36 8 °C) (Oral)   Resp 18   Ht 5' 1" (1 549 m)   Wt 80 kg (176 lb 5 9 oz)   SpO2 94%   BMI 33 32 kg/m²     Medications:   Scheduled Meds:   Current Facility-Administered Medications:  acetaminophen 650 mg Oral Q6H PRN    al mag oxide-diphenhydramine-lidocaine viscous 10 mL Swish & Spit Q6H PRN    dextromethorphan-guaiFENesin 10 mL Oral Q4H PRN    enoxaparin 40 mg Subcutaneous Daily    famotidine 20 mg Intravenous BID    ibuprofen 800 mg Oral Q6H PRN    influenza vaccine 0 5 mL Intramuscular Prior to discharge    nicotine 14 mg Transdermal Daily    ondansetron 4 mg Intravenous Q6H PRN x7   oxyCODONE-acetaminophen 1 tablet Oral Q6H PRN    oxyCODONE-acetaminophen 2 tablet Oral Q6H PRN    phenol 1 spray Mouth/Throat Q2H PRN    temazepam 15 mg Oral HS PRN          Abnormal Labs/Diagnostic Results: pt inr  15 3 1 22  Calcium 8 3 - 10 1 mg/dL 7 4   L    AST 5 - 45 U/L 209   H    Comment:    Specimen collection should occur prior to Sulfasalazine administration due to the potential for falsely depressed results  ALT 12 - 78 U/L 330   H    Comment:    Specimen collection should occur prior to Sulfasalazine administration due to the potential for falsely depressed results  Alkaline Phosphatase 46 - 116 U/L 398   H    Total Protein 6 4 - 8 2 g/dL 5 3   L    Comment: Specimen Icteric; Results May be Affected   Albumin 3 5 - 5 0 g/dL 2 3   L    Total Bilirubin 0 20 - 1 00 mg/dL 7 70      US liver 10/18    Hepatomegaly with increased echogenicity of the liver parenchyma related to diffuse hepatic steatosis  Patent the portal vein with the hepatoptal flow  Patent hepatic artery     Age/Sex: 29 y o  female     Assessment/Plan: Assessment/Plan  POD 3 Laparoscopic Cholecystectomy, IOC and MRCP unremarkable     Elevated Liver Enzymes, continue to be elevated with increase in ALT and ALK PHOS, Total bilirubin  Elevated direct bilirubin, pending  Hepatitis panel, Celiac, EBV, Anti-smooth muscle antibody and Antimicrobial antibody are still in process,   -await results of direct bili, and other labs     -cont diet  -cont ambulation  -cont Robitussin  -cont ibuprofen  Discharge Plan: TBD

## 2018-10-19 NOTE — PLAN OF CARE

## 2018-10-20 VITALS
TEMPERATURE: 99.2 F | WEIGHT: 176.37 LBS | SYSTOLIC BLOOD PRESSURE: 120 MMHG | HEART RATE: 77 BPM | BODY MASS INDEX: 33.3 KG/M2 | OXYGEN SATURATION: 95 % | HEIGHT: 61 IN | DIASTOLIC BLOOD PRESSURE: 77 MMHG | RESPIRATION RATE: 18 BRPM

## 2018-10-20 LAB
ALBUMIN SERPL BCP-MCNC: 2.5 G/DL (ref 3.5–5)
ALP SERPL-CCNC: 496 U/L (ref 46–116)
ALT SERPL W P-5'-P-CCNC: 372 U/L (ref 12–78)
ANION GAP SERPL CALCULATED.3IONS-SCNC: 5 MMOL/L (ref 4–13)
AST SERPL W P-5'-P-CCNC: 204 U/L (ref 5–45)
BILIRUB SERPL-MCNC: 6 MG/DL (ref 0.2–1)
BUN SERPL-MCNC: 11 MG/DL (ref 5–25)
CALCIUM SERPL-MCNC: 7.6 MG/DL (ref 8.3–10.1)
CHLORIDE SERPL-SCNC: 100 MMOL/L (ref 100–108)
CO2 SERPL-SCNC: 28 MMOL/L (ref 21–32)
CREAT SERPL-MCNC: 0.65 MG/DL (ref 0.6–1.3)
GFR SERPL CREATININE-BSD FRML MDRD: 118 ML/MIN/1.73SQ M
GLUCOSE SERPL-MCNC: 96 MG/DL (ref 65–140)
INR PPP: 1.11 (ref 0.86–1.17)
POTASSIUM SERPL-SCNC: 4.4 MMOL/L (ref 3.5–5.3)
PROT SERPL-MCNC: 5.9 G/DL (ref 6.4–8.2)
PROTHROMBIN TIME: 14.2 SECONDS (ref 11.8–14.2)
SODIUM SERPL-SCNC: 133 MMOL/L (ref 136–145)

## 2018-10-20 PROCEDURE — 99024 POSTOP FOLLOW-UP VISIT: CPT | Performed by: SURGERY

## 2018-10-20 PROCEDURE — 85610 PROTHROMBIN TIME: CPT | Performed by: PHYSICIAN ASSISTANT

## 2018-10-20 PROCEDURE — 80053 COMPREHEN METABOLIC PANEL: CPT | Performed by: PHYSICIAN ASSISTANT

## 2018-10-20 RX ADMIN — FAMOTIDINE 20 MG: 10 INJECTION, SOLUTION INTRAVENOUS at 08:31

## 2018-10-20 RX ADMIN — GUAIFENESIN AND DEXTROMETHORPHAN 10 ML: 100; 10 SYRUP ORAL at 08:43

## 2018-10-20 RX ADMIN — ENOXAPARIN SODIUM 40 MG: 40 INJECTION SUBCUTANEOUS at 08:31

## 2018-10-20 NOTE — PROGRESS NOTES
No major issues overnight, denies having any fever, chills, abdominal pain or any other constitutional symptoms  On examination, the abdomen is soft, nondistended and nontender  Incisions are healing well without evidence of infection or incisional hernia  I reviewed the labs show and decrease of total bilirubin to 6  Plan:  Home as per GI team   Follow up with us in 2 weeks

## 2018-10-20 NOTE — DISCHARGE INSTRUCTIONS
Call the Surgical office to make appointment for 2 weeks [phone]  Meds:  If you do not need strong pain medicine you may take Tylenol  Do not take acetaminophen (Tylenol) WITH  pain meds that contain acetaminophen  Take one or the other  Do not exceed more than 4000 mg of acetaminophen in 24 hours  or 3000 mg if you have liver disease  Follow a low fat diet until seen in the surgery office   No driving until seen in the office  No driving while taking pain meds  No heavy lifting or strenuous exercise until you are cleared in the surgery office   You may shower and get incisions wet,rinse, and pat dry  If you have steri-strips you may take them off in 5 days   If you have adhesive wound closure, do not rub or pick off  Make an appointment with gastroenterologist in 1 week    Call the office if you have increased pain not relieved with pain medicine  Call the office if you have a fever,redness, the wound opens up, you have pus draining from your incision  Colace 100 mg daily to avoid constipation  Heart Healthy Diet   Low Fat Diet       WHAT YOU NEED TO KNOW:   A heart healthy diet is an eating plan low in total fat, unhealthy fats, and sodium (salt)  A heart healthy diet helps decrease your risk for heart disease and stroke  Limit the amount of fat you eat to 25% to 35% of your total daily calories  Limit sodium to less than 2,300 mg each day  DISCHARGE INSTRUCTIONS:   Healthy fats:  Healthy fats can help improve cholesterol levels  The risk for heart disease is decreased when cholesterol levels are normal  Choose healthy fats, such as the following:  · Unsaturated fat  is found in foods such as soybean, canola, olive, corn, and safflower oils  It is also found in soft tub margarine that is made with liquid vegetable oil  · Omega-3 fat  is found in certain fish, such as salmon, tuna, and trout, and in walnuts and flaxseed    Unhealthy fats:  Unhealthy fats can cause unhealthy cholesterol levels in your blood and increase your risk of heart disease  Limit unhealthy fats, such as the following:  · Cholesterol  is found in animal foods, such as eggs and lobster, and in dairy products made from whole milk  Limit cholesterol to less than 300 milligrams (mg) each day  You may need to limit cholesterol to 200 mg each day if you have heart disease  · Saturated fat  is found in meats, such as deleon and hamburger  It is also found in chicken or turkey skin, whole milk, and butter  Limit saturated fat to less than 7% of your total daily calories  Limit saturated fat to less than 6% if you have heart disease or are at increased risk for it  · Trans fat  is found in packaged foods, such as potato chips and cookies  It is also in hard margarine, some fried foods, and shortening  Avoid trans fats as much as possible    Heart healthy foods and drinks to include:  Ask your dietitian or healthcare provider how many servings to have from each of the following food groups:  · Grains:      ¨ Whole-wheat breads, cereals, and pastas, and brown rice    ¨ Low-fat, low-sodium crackers and chips    · Vegetables:      ¨ Broccoli, green beans, green peas, and spinach    ¨ Collards, kale, and lima beans    ¨ Carrots, sweet potatoes, tomatoes, and peppers    ¨ Canned vegetables with no salt added    · Fruits:      ¨ Bananas, peaches, pears, and pineapple    ¨ Grapes, raisins, and dates    ¨ Oranges, tangerines, grapefruit, orange juice, and grapefruit juice    ¨ Apricots, mangoes, melons, and papaya    ¨ Raspberries and strawberries    ¨ Canned fruit with no added sugar    · Low-fat dairy products:      ¨ Nonfat (skim) milk, 1% milk, and low-fat almond, cashew, or soy milks fortified with calcium    ¨ Low-fat cheese, regular or frozen yogurt, and cottage cheese    · Meats and proteins , such as lean cuts of beef and pork (loin, leg, round), skinless chicken and turkey, legumes, soy products, egg whites, and nuts  Foods and drinks to limit or avoid:  Ask your dietitian or healthcare provider about these and other foods that are high in unhealthy fat, sodium, and sugar:  · Snack or packaged foods , such as frozen dinners, cookies, macaroni and cheese, and cereals with more than 300 mg of sodium per serving    · Canned or dry mixes  for cakes, soups, sauces, or gravies    · Vegetables with added sodium , such as instant potatoes, vegetables with added sauces, or regular canned vegetables    · Other foods high in sodium , such as ketchup, barbecue sauce, salad dressing, pickles, olives, soy sauce, and miso    · High-fat dairy foods  such as whole or 2% milk, cream cheese, or sour cream, and cheeses     · High-fat protein foods  such as high-fat cuts of beef (T-bone steaks, ribs), chicken or turkey with skin, and organ meats, such as liver    · Cured or smoked meats , such as hot dogs, deleon, and sausage    · Unhealthy fats and oils , such as butter, stick margarine, shortening, and cooking oils such as coconut or palm oil    · Food and drinks high in sugar , such as soft drinks (soda), sports drinks, sweetened tea, candy, cake, cookies, pies, and doughnuts  Other diet guidelines to follow:   · Eat more foods containing omega-3 fats  Eat fish high in omega-3 fats at least 2 times a week  · Limit alcohol  Too much alcohol can damage your heart and raise your blood pressure  Women should limit alcohol to 1 drink a day  Men should limit alcohol to 2 drinks a day  A drink of alcohol is 12 ounces of beer, 5 ounces of wine, or 1½ ounces of liquor  · Choose low-sodium foods  High-sodium foods can lead to high blood pressure  Add little or no salt to food you prepare  Use herbs and spices in place of salt  · Eat more fiber  to help lower cholesterol levels  Eat at least 5 servings of fruits and vegetables each day  Eat 3 ounces of whole-grain foods each day  Legumes (beans) are also a good source of fiber    Lifestyle guidelines:   · Do not smoke  Nicotine and other chemicals in cigarettes and cigars can cause lung and heart damage  Ask your healthcare provider for information if you currently smoke and need help to quit  E-cigarettes or smokeless tobacco still contain nicotine  Talk to your healthcare provider before you use these products  · Exercise regularly  to help you maintain a healthy weight and improve your blood pressure and cholesterol levels  Ask your healthcare provider about the best exercise plan for you  Do not start an exercise program without asking your healthcare provider  Follow up with your healthcare provider as directed:  Write down your questions so you remember to ask them during your visits  © 2017 [employer]  Information is for End User's use only and may not be sold, redistributed or otherwise used for commercial purposes  All illustrations and images included in CareNotes® are the copyrighted property of [employer]  or [employer]  The above information is an  only  It is not intended as medical advice for individual conditions or treatments  Talk to your doctor, nurse or pharmacist before following any medical regimen to see if it is safe and effective for you  Laparoscopic Cholecystectomy   WHAT YOU NEED TO KNOW:   Laparoscopic cholecystectomy is surgery to remove your gallbladder  DISCHARGE INSTRUCTIONS:   Medicines: You may need any of the following:  · Prescription pain medicine  helps decrease pain  Do not wait until the pain is severe before you take this medicine  · NSAIDs  decrease swelling and pain  This medicine can be bought with or without a doctor's order  This medicine can cause stomach bleeding or kidney problems in certain people  If you take blood thinner medicine, always ask your healthcare provider if NSAIDs are safe for you  Read the medicine label and follow the directions on it before using this medicine  · Take your medicine as directed  Contact your healthcare provider if you think your medicine is not helping or if you have side effects  Tell him or her if you are allergic to any medicine  Keep a list of the medicines, vitamins, and herbs you take  Include the amounts, and when and why you take them  Bring the list or the pill bottles to follow-up visits  Carry your medicine list with you in case of an emergency  Follow up with your healthcare provider 2 weeks after surgery, or as directed:  Write down your questions so you remember to ask them during your visits  Wound care:  Care for your surgical wounds as directed  Keep the wounds clean and dry  You may take a shower the day after your surgery  What to eat after surgery:  Eat low-fat foods for 4 to 6 weeks while your body learns to digest fat without a gallbladder  Slowly increase the amount of fat that you eat  Drink plenty of liquids  Ask how much liquid to drink and which liquids are best for you  When to return to work and other activities: You may return to work or other activities as soon as your pain is controlled and you feel comfortable  For many people, this is 5 to 7 days after surgery  Contact your healthcare provider if:   · You have a fever over 101°F (38°C) or chills  · You have pain or nausea that is not relieved by medicine  · You have redness and swelling around your incisions, or blood or pus is leaking from your incisions  · You are constipated or have diarrhea  · Your skin or eyes are yellow, or your bowel movements are pale  · You have questions or concerns about your surgery, condition, or care  Seek care immediately or call 911 if:   · You cannot stop vomiting  · Your bowel movements are black or bloody  · You have pain in your abdomen and it is swollen or hard  · Your arm or leg feels warm, tender, and painful  It may look swollen and red      · You feel lightheaded, short of breath, and have chest pain  · You cough up blood  © 2017 [employer] Information is for End User's use only and may not be sold, redistributed or otherwise used for commercial purposes  All illustrations and images included in CareNotes® are the copyrighted property of [employer]  or [employer]  The above information is an  only  It is not intended as medical advice for individual conditions or treatments  Talk to your doctor, nurse or pharmacist before following any medical regimen to see if it is safe and effective for you

## 2018-10-20 NOTE — DISCHARGE SUMMARY
Discharge Summary - Oneyda Morse 29 y o  female MRN: [id number]    Unit/Bed#: -01 Encounter: [id number]    Admission Date:   Admission Orders     Ordered        10/15/18 1119  Inpatient Admission  Once         10/14/18 0714  Place in Observation (expected length of stay for this patient is less than two midnights)  Once               Admitting Diagnosis: Cholecystitis [K81 9]  Abdominal pain [R10 9]    HPI:  29 year female who presents with severe epigastric and right upper quadrant abdominal pain since the night before admission  The patient was in emergency room a couple days prior to that with malaise and body aches with fever up to 103  The patient was treated for viral infections subsequently discharged from the emergency room  The patient then presented to the emergency room complaining of severe epigastric and right upper quadrant abdominal pain associated with nausea without vomiting, chills and fever, dark urine for several days  The patient was admitted the hospital after being evaluated with CT scan of the abdomen and pelvis revealing thickening wall of the gallbladder  She also had elevated liver function test       Procedures Performed:  Laparoscopic cholecystectomy with cholangiograms, CT scan of the abdomen and pelvis and MRCP    Summary of Hospital Course: The patient was admitted the hospital, ultrasound of the gallbladder revealed thickened wall but no gallbladder stones  Common bile duct was normal   MRCP failed to revealed common bile duct stones or gallbladder stones  The patient did have thickening wall of the gallbladder  This point the patient was advised to undergo laparoscopic cholecystectomy for acalculous cholecystitis  Surgery went uneventful but liver function tests still remained elevated up to 7 6  Repeat liver function tests reveal also elevation of the all falls, ALT and AST  Today the enzymes are coming down nicely with total bilirubin down to 6   The patient also had consultation with GI and Medicine, extensive workup for possible hepatitis was performed, all the test came back negative  The patient was doing well after surgery, tolerating diet having regular bowel movement  Denies having any chills, fever or any other constitutional symptoms  The patient was discharged home after being cleared by medicine team       Significant Findings, Care, Treatment and Services Provided:  Ultrasound of the gallbladder and liver, CT scan of the abdomen and pelvis, MRCP, duplex scan of the portal vein    Complications:  Persistent elevation of the liver function test, most likely for intrinsic pathology of the liver    Discharge Diagnosis:  Vital hepatitis, acalculous cholecystitis, elevated liver function tests  Resolved Problems  Date Reviewed: 10/15/2018    None          Condition at Discharge: fair         Discharge instructions/Information to patient and family:   See after visit summary for information provided to patient and family  Provisions for Follow-Up Care:  See after visit summary for information related to follow-up care and any pertinent home health orders  PCP: Manish Sneed MD    Disposition: Home    Planned Readmission: No    Discharge Statement   I spent 30 minutes discharging the patient  This time was spent on the day of discharge  I had direct contact with the patient on the day of discharge  Additional documentation is required if more than 30 minutes were spent on discharge  Discharge Medications:  See after visit summary for reconciled discharge medications provided to patient and family

## 2018-10-24 LAB
HCV RNA SERPL NAA+PROBE-ACNC: NORMAL IU/ML
TEST INFORMATION: NORMAL

## 2018-12-07 NOTE — UTILIZATION REVIEW
[facility]  INPATIENT/SPU AUTHORIZATION REQUEST    Date: 12/07/18            # Pages in this Request:     X New Request   Additional Information for PA#:     Office Contact Name:  Marcie Diaz Title: Utilization Review, Alberto Nurse     Phone: [phone]  Ext  Availability (Date/Time): Wednesday - Friday 8 am- 4 pm    X Inpatient Review  SPU Review        Current        Late Pick-up   · How your facility was first notified of the Late Pick-up: EVS   · When your facility was first notified of the Late Pick-up (date): 11/19/2018         RECIPIENT INFORMATION    Recipient ID#: [id number]   Recipient Name:  Sheryl Vanegas   YOB: 1985  29 y o  Recipient Alias:     Gender:   Male X Female Medicaid Eligibility ([address] [place] [address]): INSURANCE INFORMATION    (All other private or governmental health insurance benefits must be utilized prior to billing the [place] Program)    Was this admission the result of an MVA, other accident, assault, injury, fall, gunshot, bite etc ? Yes X No                   If yes, provide a brief description of the incident  Does the recipient have other insurance coverage? Yes X No        Insurance Company Name/Policy #      Did that insurance pay on this claim? Yes  No        Did that insurance deny this claim? Yes  No    If yes, reason for denial:      Does the recipient have Medicare? Yes X No        Did Medicare exhaust prior to this admission? Yes  No            Did Medicare partially pay this claim? Yes  No        Did that insurance deny this claim? Yes  No    If yes, reason for denial:          Was the recipient a prisoner at the time of admission?    Yes X No            PROVIDER INFORMATION    Hospital Name: [facility] [address] Provider ID#: [id number]    Admitting Physician Name: [address] [place] [address] Provider ID#:  [id number]      ADMISSION INFORMATION    Type of Admission: (please choose one)    X ED      Direct    If yes, from where? Transfer    If yes, transferring hospital (inpatient, rehab, or psych) Provider Name/Provider ID#: Admission Floor or Unit Type:  Med Surg   Dates/Times:        ED Date/Time: 10/14/2018  4:53 AM        Observation Date/Time: 10/14/2018 07:14        Admission Date/Time: 10/15/18 1119        Discharge or Transfer Date/Time: 10/20/2018  5:14 PM        DIAGNOSIS/PROCEDURE CODES    Primary Diagnosis Code/Primary Diagnosis Code description:   K81 0   Acute cholecystitis    Additional Diagnosis Code(s) and Description(s)-(up to three additional codes):   B19 9 Unspecified viral hepatitis without hepatic coma  D69 6 Thrombocytopenia, unspecified  R74 0 Nonspecific elevation of levels of transaminase and lactic acid dehydrogenase (ldh)    Procedure Code (one) and description:   [id number]  resection of Gallbladder, percutaneous endoscopic approach       CLINICAL INFORMATION - PRIOR ADMISSION ONLY    Is there a prior admission with a discharge date within 30 days of the date of this admission? X No (Proceed to the next section - "Clinical Information - General Review Checklist:)      Yes (Provide the following information)     Prior admission dates:    MA Prior Authorization Number:        Review Outcome:     Diagnosis Code(s)/Description:    Procedure Code/Description:    Findings:    Treatment:    Condition on Discharge:   Vitals:    Labs:   Imaging:   Medications: Follow-up Instructions:    Disposition:        CLINICAL INFORMATION - GENERAL REVIEW CHECKLIST    EMERGENCY DEPARTMENT: (Proceed to "ADMISSION" if Direct Admission)    Presenting Signs/Symptoms: Christine Jonas is a 29 y  o  female who presents with severe epigastric and right upper quadrant abdominal pain since last night   The patient was in the emergency room a couple days ago complaining of malaise and body aches with fevers up to 103   The patient was treated for viral infection subsequently discharged from the emergency room   Then the patient presented early this morning complaining of severe epigastric and right upper quadrant associated with nausea without vomiting, chills and fever, dark urine for several days  Irwin Mistry does not recall any single event the provoked the symptoms          Medication/treatment prior to arrival in the ED: none    Past Medical History:   No Additional Past Medical History   C section x 1     Clinical Exam: There is epigastric and right upper quadrant abdominal tenderness, no rebound no guarding     Initial Vital Signs: (Temp, Pulse, Resp, and BP)   ED Triage Vitals   Temperature Pulse Respirations Blood Pressure SpO2   10/14/18 0459 10/14/18 0457 10/14/18 0457 10/14/18 0457 10/14/18 0457   97 6 °F (36 4 °C) 105 19 131/78 100 %      Temp Source Heart Rate Source Patient Position - Orthostatic VS BP Location FiO2 (%)   10/14/18 0459 10/14/18 0457 10/14/18 0457 10/14/18 0457 --   Oral Monitor Lying Right arm       Pain Score       10/14/18 0457       Worst Possible Pain           Pertinent Repeat Vital Signs: (include times they were obtained)    Pertinent Sustained Findings: (include times they were obtained)    Weight in Kilograms:  Wt Readings from Last 1 Encounters:   10/14/18 80 kg (176 lb 5 9 oz)       Pertinent Labs (results):   10/14/18 0515     WBC 4 31 - 10 16 Thousand/uL 5 18       10/14/18 0515       Segmented % 43 - 75 % 78     Bands % 0 - 8 % 12     Absolute Neutrophils 1 85 - 7 62 Thousand/uL 4 66    Lymphocytes Absolute 0 60 - 4 47 Thousand/uL 0 26            10/14/18 0515     Sodium 136 - 145 mmol/L 133     Potassium 3 5 - 5 3 mmol/L 3 5    Chloride 100 - 108 mmol/L 97     Glucose 65 - 140 mg/dL 172     Calcium 8 3 - 10 1 mg/dL 8 4         10/14/18 0515       Total Bilirubin 0 20 - 1 00 mg/dL 2 90     Bilirubin, Direct 0 00 - 0 20 mg/dL 2 36     Alkaline Phosphatase 46 - 116 U/L 212     AST 5 - 45 U/L 168     ALT 12 - 78 U/L 191           Radiology (results):CT abdomen and pelvis:  No radiopaque gallstones  Gallbladder wall hyperemia without thickening likely reactive   Follow-up with gallbladder sonogram if clinically warranted  Minimal free fluid in the cul-de-sac and pooling within the gallbladder fossa   Fluid within physiologic limits   This may be sequela of leaking or ruptured right ovarian 3 cm dominant follicle  Nonobstructive bowel pattern suggestive of mild gastroenteritis   N    EKG (results): Other tests (results):    Tests pending final results:    Treatment in the ED:   Medication Administration from 10/14/2018 0451 to 10/14/2018 0738       Date/Time Order Dose Route Action     10/14/2018 0524 morphine (PF) 10 mg/mL injection 6 mg 6 mg Intravenous Given     10/14/2018 0524 ondansetron (ZOFRAN) injection 4 mg 4 mg Intravenous Given     10/14/2018 0523 ketorolac (TORADOL) injection 15 mg 15 mg Intravenous Given     10/14/2018 0525 sodium chloride 0 9 % bolus 1,000 mL 1,000 mL Intravenous New Bag          Other treatments:       Change in condition while in the ED:       Response to ED Treatment:   diagnostic testing showed normal white count of 5 1 no sign of inflammation, hemoglobin was normal at 14 4 no sign of anemia  Urinalysis showed no sign of infection  Pregnancy test was negative  patient's electrolytes showed no anion gap, normal renal function  Patient had markedly abnormal liver functions test, total bilirubin was 2 9, direct bili was 2 36    CT scan the abdomen pelvis showed inflammation of the gallbladder,   discussed with patient, believe exam and CT consistent with cholecystitis, I spoke with General surgery Dr Valentina Hernandez he will see the patient         MDM medical decision making 21-year-old female presents with right upper quadrant pain, focal tenderness and elevated bilirubin  Presentation consistent with cholecystitis on physical exam and CT scan  Discussed with General surgery will evaluate the patient    I placed the patient in observation  OBSERVATION: (Proceed to "ADMISSION" if Direct Admission)    Orders written during the observation period  Meds Name, dose, route, time, how may doses given:   acetaminophen 650 mg Oral Q6H PRN   enoxaparin 40 mg Subcutaneous Daily   famotidine 20 mg Intravenous BID   morphine injection 2 mg Intravenous Q1H PRN x 1 @ 1411   ondansetron 4 mg Intravenous Q6H PRN        PRN Meds Name, dose, route, time, how many doses given within the first 24 hrs :  IVs Type, rate, and total amt  ordered/given:  dextrose 5 % and sodium chloride 0 45 % with KCl 20 mEq/L 125 mL/hr       Labs, imaging, other:  Consults and findings:    Test Results during the observation period  Pertinent Lab tests (dates/results):  Culture results (blood, urine, spinal, wound, respiratory, etc ):  Imaging tests (dates/results):  EKG (dates/results): Other test (dates/results):  Tests pending (dates/results):    Surgical or Invasive Procedures during the observation period  Name of surgery/procedure:  Date & Time:  Patient Response:  Post-operative orders:  Operative Report/Findings:    Response to Treatment, Major Change in Condition, Major Charge in Treatment during the observation period          ADMISSION:    DIRECT Admissions Only:    · Presenting Signs/Symptoms:   ·   · Medication/treatment prior to arrival:  ·   · Past Medical History:  ·   · Clinical Exam on admission:  ·   · Vital Signs on admission: (Temp, Pulse, Resp, and BP)  ·   · Weight in kilograms:     ALL Admissions:    Admission Orders and Other Orders written within the first 24 hrs after admission  Meds Name, dose, route, time, how may doses given:  PRN Meds Name, dose, route, time, how many doses given within the first 24 hrs :  IVs Type, rate, and total amt   ordered/given:  Labs, imaging, other:        Ref Range & Units 10/18/18 0858 10/17/18 0433 10/15/18 0625 10/14/18 0515 10/14/18 0515   Sodium 136 - 145 mmol/L 136  140  133    133     Potassium 3 5 - 5 3 mmol/L 3 8  4 3  4 0   3 5    Chloride 100 - 108 mmol/L 104  108  103   97     CO2 21 - 32 mmol/L 25  22  20    24    ANION GAP 4 - 13 mmol/L 7  10  10   12    BUN 5 - 25 mg/dL 13  10  9   12    Creatinine 0 60 - 1 30 mg/dL 0 74  0 84CM  0 83CM   1 08CM    Glucose 65 - 140 mg/dL 101  149CM   128CM   172CM     Calcium 8 3 - 10 1 mg/dL 7 4   7 4   7 0    8 4    AST 5 - 45 U/L 246   259CM   409CM   168CM      ALT 12 - 78 U/L 316   273CM   304CM   191CM      Alkaline Phosphatase 46 - 116 U/L 333   219   194   212      Total Protein 6 4 - 8 2 g/dL 5 4   4 9CM   5 2   6 5     Albumin 3 5 - 5 0 g/dL 2 4   2 1   2 4   3 2      Total Bilirubin 0 20 - 1 00 mg/dL 7 60   5 00   3 10   2 90      eGFR ml/min/1 73sq m 108  92  94   68      MRCP on 10/15 - IMPRESSION:   No evidence of choledocholithiasis    Marked gallbladder wall edema without calculi likely representing acalculus cholecystitis  Associated right upper quadrant edema        Consults and findings: Gastroenterology 10/18 - Patient is a 27-year-old female with no significant past medical history status post recent laparoscopic cholecystectomy, GI is consulted for persistently elevated liver enzymes  She has mild incisional pain      Elevated liver enzymes- will review recent right upper quadrant ultrasound, would consider imaging with Dopplers if not previously done to rule out portal venous thrombosis  IOC and MRCP negative for choledocholithiasis  Ischemic hepatitis versus viral versus other is a consideration, laboratory workup pending in ordered  Would continue to monitor liver enzymes  Test Results after admission  Pertinent Lab tests (dates/results):  Culture results (blood, urine, spinal, wound, respiratory, etc ):  Imaging tests (dates/results):  EKG (dates/results):   Other test (dates/results):  Tests pending (dates/results):    Surgical or Invasive Procedures  Name of surgery/procedure:CHOLECYSTECTOMY LAPAROSCOPIC w/ IOC (N/A)  Date & Time:10/16 @ 07:55  Patient Response: tolerated   Post-operative orders: Same meds   Operative Report/Findings: The gallbladder was markedly distended with edema of the wall throughout, liver surface shows fatty infiltration and hepatomegaly  Intraoperative cholangiograms failed to reveal any proximal distal filling defects and the contrast went freely into the small bowel  The rest of the abdominal cavity showed no evidence of inflammatory or neoplastic process  Response to Treatment, Major Change in Condition, Major Charge in Treatment anytime during admission  The patient was admitted the hospital, ultrasound of the gallbladder revealed thickened wall but no gallbladder stones  Common bile duct was normal   MRCP failed to revealed common bile duct stones or gallbladder stones  The patient did have thickening wall of the gallbladder  This point the patient was advised to undergo laparoscopic cholecystectomy for acalculous cholecystitis  Surgery went uneventful but liver function tests still remained elevated up to 7 6  Repeat liver function tests reveal also elevation of the all falls, ALT and AST  Today the enzymes are coming down nicely with total bilirubin down to 6  The patient also had consultation with GI and Medicine, extensive workup for possible hepatitis was performed, all the test came back negative  The patient was doing well after surgery, tolerating diet having regular bowel movement  Denies having any chills, fever or any other constitutional symptoms  The patient was discharged home after being cleared by medicine team        Significant Findings, Care, Treatment and Services Provided:  Ultrasound of the gallbladder and liver, CT scan of the abdomen and pelvis, MRCP, duplex scan of the portal vein        Discharge Diagnosis:  Vital hepatitis, acalculous cholecystitis, elevated liver function tests    Disposition on Discharge  Home, Rehab, SNF, LTC, Shelter, etc : Home/Self Care    Cease to Breathe (CTB)  If a patient expires during an admission, in addition to the above information, please include:    Summary/timeline of the patient's decline in condition:    Medications and treatment:    Patient response to treatment:    Date and time patient ceased to breathe:        Is there a Readmission that follows this admission? Yes X No    If yes, reason for denial:          InterQual Review    InterQual Criteria Met: X Yes  No  N/A        Please include the InterQual Review, InterQual year/version used, and the criteria selected:   Created Using Review Status Review Entered   InterQual® In Primary 10/15/2018 07:39      Criteria Set Name - Subset   LOC:Acute Adult-General Medical      Criteria Review   REVIEW SUMMARY     Patient: Guillermina Sher  Review Number: [id number]  Review Status: In Primary  Criteria Status: Acute Met  Day Met: Episode Day 1     Condition Specific: Yes        OUTCOMES  Outcome Type: Primary           REVIEW DETAILS     Product: [employer] S[facility] Morton Adult  Subset: General Medical      (Symptom or finding within 24h)         (Excludes PO medications unless noted)          [X] Select Day, One:              [X] Episode Day 1, One:                  [X] ACUTE, >= One:                      [X] Gastrointestinal or biliary, One:                          [X] Other gastrointestinal diagnosis, actual or suspected, >= One:                              [X] Jaundice or bilirubin > 2 5 mg/dL(42 8 µmol/L), Both:                                  [X] Finding, >= One:                                      [X] Abdominal pain                                  [X] Intervention, >= One:                                      [X] ERCP or MRCP     Version: Firm58® 2017 2  Rebecca Robertson and [employer]  © 2017 [employer] and/or one of its [place]  All Rights Reserved  CPT only © 2016 American [employer] Association  All Rights Reserved               PLEASE SUBMIT THE COMPLETED FORM TO THE DEPARTMENT OF HUMAN SERVICES - DIVISION OF CLINICAL  REVIEW VIA FAX [phone] or VIA E-MAIL TO [email or web address]    Signature: Adamaris Brunson Date:  12/07/18    Confidentiality Notice: The documents accompanying this telecopy may contain confidential information belonging to the sender  The information is intended only for the use of the individual named above  If you are not the intended recipient, you are hereby notified  That any disclosure, copying, distribution or taking of any telecopy is strictly prohibited

## 2019-07-18 ENCOUNTER — HOSPITAL ENCOUNTER (EMERGENCY)
Facility: HOSPITAL | Age: 34
Discharge: HOME | End: 2019-07-18
Attending: EMERGENCY MEDICINE | Admitting: EMERGENCY MEDICINE
Payer: COMMERCIAL

## 2019-07-18 VITALS
BODY MASS INDEX: 33.3 KG/M2 | HEIGHT: 61 IN | WEIGHT: 176.37 LBS | DIASTOLIC BLOOD PRESSURE: 81 MMHG | RESPIRATION RATE: 17 BRPM | SYSTOLIC BLOOD PRESSURE: 117 MMHG | OXYGEN SATURATION: 97 % | HEART RATE: 92 BPM

## 2019-07-18 DIAGNOSIS — S39.012A STRAIN OF LUMBAR REGION, INITIAL ENCOUNTER: Primary | ICD-10-CM

## 2019-07-18 DIAGNOSIS — M54.9 MUSCULOSKELETAL BACK PAIN: ICD-10-CM

## 2019-07-18 PROCEDURE — 99284 EMERGENCY DEPT VISIT MOD MDM: CPT | Performed by: NURSE PRACTITIONER

## 2019-07-18 PROCEDURE — 96372 THER/PROPH/DIAG INJ SC/IM: CPT

## 2019-07-18 PROCEDURE — 99283 EMERGENCY DEPT VISIT LOW MDM: CPT

## 2019-07-18 RX ORDER — DIAPER,BRIEF,INFANT-TODD,DISP
EACH MISCELLANEOUS 4 TIMES DAILY PRN
Status: DISCONTINUED | OUTPATIENT
Start: 2019-07-18 | End: 2019-07-18 | Stop reason: HOSPADM

## 2019-07-18 RX ORDER — CYCLOBENZAPRINE HCL 10 MG
10 TABLET ORAL 3 TIMES DAILY PRN
Qty: 30 TABLET | Refills: 0 | Status: SHIPPED | OUTPATIENT
Start: 2019-07-18 | End: 2021-06-21

## 2019-07-18 RX ORDER — LIDOCAINE 50 MG/G
1 PATCH TOPICAL DAILY
Qty: 15 PATCH | Refills: 0 | Status: SHIPPED | OUTPATIENT
Start: 2019-07-18 | End: 2021-06-21

## 2019-07-18 RX ORDER — DIAZEPAM 5 MG/1
5 TABLET ORAL ONCE
Status: COMPLETED | OUTPATIENT
Start: 2019-07-18 | End: 2019-07-18

## 2019-07-18 RX ORDER — KETOROLAC TROMETHAMINE 30 MG/ML
30 INJECTION, SOLUTION INTRAMUSCULAR; INTRAVENOUS ONCE
Status: COMPLETED | OUTPATIENT
Start: 2019-07-18 | End: 2019-07-18

## 2019-07-18 RX ORDER — LIDOCAINE 50 MG/G
1 PATCH TOPICAL ONCE
Status: DISCONTINUED | OUTPATIENT
Start: 2019-07-18 | End: 2019-07-18 | Stop reason: HOSPADM

## 2019-07-18 RX ORDER — NAPROXEN 500 MG/1
500 TABLET ORAL 2 TIMES DAILY WITH MEALS
Qty: 10 TABLET | Refills: 0 | Status: SHIPPED | OUTPATIENT
Start: 2019-07-18 | End: 2019-07-23

## 2019-07-18 RX ADMIN — DIAZEPAM 5 MG: 5 TABLET ORAL at 07:34

## 2019-07-18 RX ADMIN — KETOROLAC TROMETHAMINE 30 MG: 30 INJECTION, SOLUTION INTRAMUSCULAR at 07:34

## 2019-07-18 RX ADMIN — HYDROCORTISONE: 1 CREAM TOPICAL at 07:34

## 2019-07-18 RX ADMIN — LIDOCAINE 1 PATCH: 50 PATCH TOPICAL at 07:33

## 2019-07-18 NOTE — ED PROVIDER NOTES
History  Chief Complaint   Patient presents with    Back Pain     patient bent over and felt pain in her lower back      Vishnu Troy is a 28-year-old female who presents here with a chief complaint of right-sided back pain  She reports that yesterday she was attempting to  a heavy object specifically a  and before she even picked up the  she felt a sharp pain in her back and now is continued associated pain and muscle spasm  There are no red flags no saddle anesthesia no incontinence  She reports that she has been using topical preparation but has not taken any oral medications  Minimal relief she has also been stretching which has helped a little bit          Prior to Admission Medications   Prescriptions Last Dose Informant Patient Reported? Taking?   naproxen (NAPROSYN) 500 mg tablet   No No   Sig: Take 1 tablet (500 mg total) by mouth 2 (two) times a day with meals   omeprazole (PriLOSEC) 20 mg delayed release capsule   No No   Sig: Take 2 capsules by mouth daily for 14 days   ondansetron (ZOFRAN-ODT) 4 mg disintegrating tablet   No No   Sig: Take 1 tablet by mouth every 6 (six) hours as needed for nausea or vomiting for up to 3 days   pseudoephedrine (SUDAFED) 120 MG 12 hr tablet   No No   Sig: Take 1 tablet (120 mg total) by mouth 2 (two) times a day   sucralfate (CARAFATE) 1 g tablet   No No   Sig: Take 1 tablet by mouth 4 (four) times a day for 7 days      Facility-Administered Medications: None       History reviewed  No pertinent past medical history  Past Surgical History:   Procedure Laterality Date     SECTION      CHOLECYSTECTOMY LAPAROSCOPIC N/A 10/16/2018    Procedure: CHOLECYSTECTOMY LAPAROSCOPIC w/ IOC;  Surgeon: Louise Boyle MD;  Location: [facility] OR;  Service: General       History reviewed  No pertinent family history  I have reviewed and agree with the history as documented      Social History     Tobacco Use    Smoking status: Current Every Day Smoker     Packs/day: 1 00    Smokeless tobacco: Never Used   Substance Use Topics    Alcohol use: No    Drug use: No        Review of Systems   Constitutional: Negative for fatigue and fever  Respiratory: Negative for chest tightness and shortness of breath  Cardiovascular: Negative for chest pain and leg swelling  Gastrointestinal: Negative for abdominal pain, diarrhea, nausea and vomiting  Genitourinary: Negative for dysuria, flank pain, hematuria and urgency  Musculoskeletal: Positive for back pain and myalgias  Negative for gait problem, neck pain and neck stiffness  Skin: Negative for rash and wound  Neurological: Negative for weakness and numbness  Psychiatric/Behavioral: Negative for confusion  Physical Exam  Physical Exam   Constitutional: She is oriented to person, place, and time  She appears well-developed and well-nourished  She appears distressed (Minimally)  HENT:   Head: Normocephalic and atraumatic  Eyes: Pupils are equal, round, and reactive to light  EOM are normal    Neck: Normal range of motion  Neck supple  Cardiovascular: Normal rate and regular rhythm  Pulmonary/Chest: Effort normal  No respiratory distress  Abdominal: Normal appearance  She exhibits no distension  There is no guarding and no CVA tenderness  Musculoskeletal: She exhibits no deformity  Lumbar back: She exhibits tenderness, pain and spasm  She exhibits no bony tenderness, no swelling and no deformity  Back:    Neurological: She is alert and oriented to person, place, and time  She exhibits normal muscle tone  Skin: Skin is warm and dry  No rash noted  Psychiatric: She has a normal mood and affect  Vitals reviewed        Vital Signs  ED Triage Vitals [07/18/19 0622]   Temp Pulse Respirations Blood Pressure SpO2   -- 92 17 117/81 97 %      Temp src Heart Rate Source Patient Position - Orthostatic VS BP Location FiO2 (%)   -- Monitor Standing Right arm --      Pain Score Worst Possible Pain           Vitals:    07/18/19 0622   BP: 117/81   Pulse: 92   Patient Position - Orthostatic VS: Standing         Visual Acuity      ED Medications  Medications   ketorolac (TORADOL) injection 30 mg (30 mg Intramuscular Given 7/18/19 0734)   diazepam (VALIUM) tablet 5 mg (5 mg Oral Given 7/18/19 0734)       Diagnostic Studies  Results Reviewed     None                 No orders to display              Procedures  Procedures       ED Course                               MDM  Number of Diagnoses or Management Options  Musculoskeletal back pain: new and requires workup  Strain of lumbar region, initial encounter: new and requires workup  Patient Progress  Patient progress: improved      Disposition  Final diagnoses:   Strain of lumbar region, initial encounter   Musculoskeletal back pain     Time reflects when diagnosis was documented in both MDM as applicable and the Disposition within this note     Time User Action Codes Description Comment    7/18/2019  7:37 AM Yue Box [S39 012A] Strain of lumbar region, initial encounter     7/18/2019  7:37 AM Yue Box [M54 9] Musculoskeletal back pain       ED Disposition     ED Disposition Condition Date/Time Comment    Discharge Stable u Jul 18, 2019  7:36 AM Subhash Saavedra discharge to home/self care              Follow-up Information     Follow up With Specialties Details Why Contact Info    Kel Walls MD  Schedule an appointment as soon as possible for a visit  As needed, For Recheck [address] [place] [address]  [phone]            Discharge Medication List as of 7/18/2019  7:39 AM      START taking these medications    Details   cyclobenzaprine (FLEXERIL) 10 mg tablet Take 1 tablet (10 mg total) by mouth 3 (three) times a day as needed for muscle spasms for up to 7 days, Starting Thu 7/18/2019, Until u 7/25/2019, Print      lidocaine (LIDODERM) 5 % Apply 1 patch topically daily for 15 doses Remove & Discard patch within 12 hours or as directed by MD, Starting u 7/18/2019, Until Fri 8/2/2019, Print      !! naproxen (NAPROSYN) 500 mg tablet Take 1 tablet (500 mg total) by mouth 2 (two) times a day with meals for 5 days, Starting u 7/18/2019, Until Tue 7/23/2019, Print       !! - Potential duplicate medications found  Please discuss with provider  CONTINUE these medications which have NOT CHANGED    Details   !! naproxen (NAPROSYN) 500 mg tablet Take 1 tablet (500 mg total) by mouth 2 (two) times a day with meals, Starting Thu 10/11/2018, Print      omeprazole (PriLOSEC) 20 mg delayed release capsule Take 2 capsules by mouth daily for 14 days, Starting Sat 9/23/2017, Until Sat 10/7/2017, Print      ondansetron (ZOFRAN-ODT) 4 mg disintegrating tablet Take 1 tablet by mouth every 6 (six) hours as needed for nausea or vomiting for up to 3 days, Starting Sat 9/23/2017, Until Tue 9/26/2017, Print      pseudoephedrine (SUDAFED) 120 MG 12 hr tablet Take 1 tablet (120 mg total) by mouth 2 (two) times a day, Starting u 10/11/2018, Print      sucralfate (CARAFATE) 1 g tablet Take 1 tablet by mouth 4 (four) times a day for 7 days, Starting Sat 9/23/2017, Until Sat 9/30/2017, Print       !! - Potential duplicate medications found  Please discuss with provider  No discharge procedures on file      ED Provider  Electronically Signed by           TANNER Coates  07/18/19 6956

## 2021-04-21 NOTE — PROGRESS NOTES
Technician: Study performed by the interpreting Certified Nurse Practitioner    Indications:  amenorrhea       Prior to use, disinfection was performed with [employer] Disinfection Process  Probe Serial Number#                                          [id number] [facility])      LMP 21   ( 8 weeks 5 days gestational age based on dates) Prior C/S             Procedure Details  A gestational sac is seen containing a yolk sac and a marshall embryo  The embryonic crown-rump length measures 1 47cm  and calculates to an estimated gestational age of 9 weeks and 6 Days  Embryonic cardiac activity is present @ 150 BPM     Findings:   Viable, marshall intrauterine pregnancy at  8 weeks,  5 days, (no change to SKYLER)  with a calculated SKYLER of 21     Plan to RTO for OB Intake    First Trimester Screening ordered, ADALBERTO

## 2021-04-21 NOTE — PATIENT INSTRUCTIONS
First Trimester Pregnancy   WHAT YOU NEED TO KNOW:   The first trimester of pregnancy lasts from your last period through the 12th week of pregnancy  Follow up with your healthcare providers for prenatal care  Regular prenatal care can help to keep you and your baby healthy  DISCHARGE INSTRUCTIONS:   Return to the emergency department if:   · You have pain or cramping in your abdomen or low back  · You have severe vaginal bleeding or clotting  Call your doctor or obstetrician if:   · You have light bleeding  · You have chills or a fever  · You have vaginal itching, burning, or pain  · You have yellow, green, white, or foul-smelling vaginal discharge  · You have pain or burning when you urinate, less urine than usual, or pink or bloody urine  · You have questions or concerns about your condition or care  Follow up with your doctor or obstetrician as directed:  Go to all of your prenatal visits during your pregnancy  Write down your questions so you remember to ask them during your visits  Prenatal care:  Prenatal care is a series of visits with your healthcare provider throughout your pregnancy  Prenatal care can help prevent problems during pregnancy and childbirth  At each prenatal visit, your healthcare provider will weigh you and check your blood pressure  Your healthcare provider will also check your baby's heartbeat and growth  You may also need the following at some visits:  · A pelvic exam  allows your healthcare provider to see your cervix (the bottom part of your uterus)  Your healthcare provider will use a speculum to open your vagina  He or she will check the size and shape of your uterus  · Blood tests  may be done to check for any of the following:     ? Gestational diabetes or anemia (low iron level)    ? Blood type or Rh factor, or certain birth defects    ? Immunity to certain diseases, such as chickenpox or rubella    ?  An infection, such as a sexually transmitted infection, HIV, or hepatitis B    · Hepatitis B  may need to be prevented or treated  Hepatitis B is inflammation of the liver caused by the hepatitis B virus (HBV)  HBV can spread from a mother to her baby during delivery  You will be checked for HBV as early as possible in the first trimester of each pregnancy  You need the test even if you received the hepatitis B vaccine or were tested before  You may need to have an HBV infection treated before you give birth  · Urine tests  may also be done to check for sugar and protein  These can be signs of gestational diabetes or preeclampsia  Urine tests may also be done to check for signs of infection  · A fetal ultrasound  shows pictures of your baby inside your uterus  The pictures are used to check your baby's development, movement, and position  Your healthcare provider may be able to tell you if you are having a boy or a girl during the ultrasound  This is usually possible at around 18 to 22 weeks  · Genetic disorder screening tests  may be offered to you  These screening tests check your baby's risk for genetic disorders such as Down syndrome  A screening test includes a blood test and ultrasound  Stay healthy during pregnancy:   · Take prenatal vitamins as directed  Prenatal vitamins can help you get the amount of vitamins and minerals you need during pregnancy  Prenatal vitamins may also decrease the risk of certain birth defects  · Eat a variety of healthy foods  Healthy foods include fruits, vegetables, whole-grain breads, low-fat dairy products, beans, turkey and chicken, and lean red meat  Ask your healthcare provider for more information about foods that are healthy and safe to eat during pregnancy  · Drink liquids as directed  Ask how much liquid to drink each day and which liquids are best for you  Some healthy liquids include milk, water, and juice  It is not clear how caffeine affects pregnancy   Limit your intake of caffeine to less than 200 mg each day to avoid possible health problems  Caffeine may be found in coffee, tea, cola, sports drinks, and chocolate  Do not drink alcohol  · Talk to your healthcare provider before you take medicines  Many medicines can harm your baby, especially during early pregnancy  Ask your healthcare provider before you take any medicines, including over-the-counter medicines, vitamins, herbs, or food supplements  Never use illegal or street drugs while you are pregnant  Talk to your healthcare provider if you are having trouble quitting street drugs  · Exercise as directed  Ask your healthcare provider about the best exercise plan for you  Exercise may help you feel better and make your labor and delivery easier  · Do not smoke  Smoking increases your risk of a miscarriage and other health problems during your pregnancy  Smoking can cause your baby to be born too early or weigh less at birth  Quit smoking as soon as you think you might be pregnant  Ask your healthcare provider for information if you need help quitting  Safety tips:   · Do not use a hot tub or sauna  while you are pregnant, especially during your first trimester  Hot tubs and saunas may raise your baby's temperature and increase the risk of birth defects  It also increases your risk of miscarriage  · Protect yourself from illness  Toxoplasmosis is a disease that can cause birth defects  To protect yourself from this disease, do not clean your cat's litter box yourself  Ask someone else to clean your cat's litter box while you are pregnant  Also, do not  eat raw meat or unwashed fruits and vegetables  Wash your hands after you touch raw meat, and eat only well-cooked meat  Wash fruits and vegetables well before you eat them  © Copyright [address] Information is for End User's use only and may not be sold, redistributed or otherwise used for commercial purposes   All illustrations and images included in CareNotes® are the copyrighted property of [employer]  or [employer]   The above information is an  only  It is not intended as medical advice for individual conditions or treatments  Talk to your doctor, nurse or pharmacist before following any medical regimen to see if it is safe and effective for you

## 2021-04-22 ENCOUNTER — ULTRASOUND (OUTPATIENT)
Dept: OBGYN CLINIC | Facility: CLINIC | Age: 36
End: 2021-04-22
Payer: COMMERCIAL

## 2021-04-22 DIAGNOSIS — Z36.9 ANTENATAL SCREENING ENCOUNTER: ICD-10-CM

## 2021-04-22 DIAGNOSIS — N91.2 AMENORRHEA: Primary | ICD-10-CM

## 2021-04-22 DIAGNOSIS — O09.529 ANTEPARTUM MULTIGRAVIDA OF ADVANCED MATERNAL AGE: ICD-10-CM

## 2021-04-22 PROCEDURE — 76817 TRANSVAGINAL US OBSTETRIC: CPT | Performed by: NURSE PRACTITIONER

## 2021-05-05 ENCOUNTER — TELEMEDICINE (OUTPATIENT)
Dept: OBGYN CLINIC | Facility: CLINIC | Age: 36
End: 2021-05-05
Payer: COMMERCIAL

## 2021-05-05 VITALS — BODY MASS INDEX: 34.55 KG/M2 | WEIGHT: 176 LBS | HEIGHT: 60 IN

## 2021-05-05 DIAGNOSIS — Z34.81 PRENATAL CARE, SUBSEQUENT PREGNANCY, FIRST TRIMESTER: ICD-10-CM

## 2021-05-05 PROCEDURE — T1001 NURSING ASSESSMENT/EVALUATN: HCPCS | Performed by: NURSE PRACTITIONER

## 2021-05-05 NOTE — PROGRESS NOTES
OB INTAKE INTERVIEW  Patient is 29y o y o  year old who presents for OB intake at 10 wks  She is accompanied by: Arabella  The father of her baby Luis Chu) is involved in the pregnancy and is 35years old    Last Menstrual Period: 21  Ultrasound: Measured 7 weeks 6 days on 2021  Estimated Date of Delivery: 2021 confirmed 7 week US     Signs/Symptoms of Pregnancy  Current pregnancy symptoms: nausea  Constipation no  Headaches YES sometimes ( depends on day)  Cramping/spotting no  PICA cravings no    Diabetes-  Body mass index is 34 37 kg/m²  If patient has 1 or more, please order early 1 hour GTT  History of GDM YES  BMI >35 no  History of PCOS or current metformin use no  History of LGA/macrosomic infant (4000g/9lbs) no    If patient has 2 or more, please order early 1 hour GTT  BMI>30 YES  AMA YES  First degree relative with type 2 diabetes no  History of chronic HTN, hyperlipidemia, elevated A1C no  High risk race (, , ,  or ) no    Hypertension- if you answer yes, please order preeclampsia labs (comprehensive metabolic panel, urine protein creatinine ratio, 24 hour urine)  History of of chronic HTN no  History of gestational HTN no  History of preeclampsia, eclampsia, or HELLP syndrome no  History of diabetes YES GDM  History of lupus, autoimmune disease, kidney disease no    Thyroid- if yes order TSH with reflex T4  History of thyroid disease no    Bleeding Disorder or Hx of DVT-patient or first degree relative with history of  Order the following if not done previously     (Factor V, antithrombin III, prothrombin gene mutation, protein C and S Ag, lupus anticoagulant, anticardiolipin, beta-2 glycoprotein)   no    OB/GYN-  History of abnormal pap smear no  History of HPV no  History of Herpes/HSV no  History of other STI (gonorrhea, chlamydia, trich) no  History of prior  no  History of prior  YES  History of  delivery prior to 36 weeks 6 days no  History of blood transfusion no  Ok for blood transfusion YES    Substance screening- if yes outside of tobacco for her or anyone in her home-order urine drug screen  History of tobacco use no  Currently using tobacco no  Currently using alcohol no  Presently using drugs YES marijuana  Past drug use  no  IV drug use-If yes add Hep C antibody to labs no  Partner drug use no  Parent/Family drug use YES mom drug use not involved    MRSA Screening-   Does the pt have a hx of MRSA? no  If yes- please follow MRSA protocol and obtain a nasal swab for MRSA culture    Immunizations:  Influenza vaccine given this season NO  Discussed Tdap vaccine YES  Discussed COVID Vaccine NO- not interested    Genetic/M-  Do you or your partner have a history of any of the following in yourselves or first degree relatives? Cystic fibrosis no  Spinal muscular atrophy no  Hemoglobinopathy/Sickle Cell/Thalassemia no  Fragile X Intellectual Disability no    If yes, discuss carrier screening and recommend consultation with [facility]/genetic counseling  If no, discuss option for carrier screening and/or genetic testing with Nuchal Ultrasound  Patient interested YES- due to family hx of Muscular dystrophy  Appointment at [facility] made YES 5/19 for NT already scheduled    Interview education  [facility] Pregnancy Essentials Book reviewed and discussed YES    Nurse/Family Partnership- patient may qualify YES; referral placed NO- if eligible please offer at next visit    Prenatal lab work scripts entered  Extra labs ordered: 1hr Glucose and drug screen    The patient has a history now or in prior pregnancy notable for:  gestational DM, drug use of marijuana and currently is of AMA      Details that I feel the provider should be aware of: Hilda Quick and Rima Adams are expecting their first baby together  He has a 15 yr old daughter form a prior relationship that lives with them   Currently she is taking gummy PN's and I advised to switch or add iron until this bottle runs out  She has a hx of GDM  Her last pregnancy she didn't start PN care until about 27 wks  She did have a csection with that pregnancy due to erratic HR of baby at 39 wks  I added drug screen to todays labs because she is currently still using marijuana, states she has cut back to once a day mostly in the am when she feels nausea  Reviewed with her we do not recommend smoking of any kind during pregnancy  Also added 1 hr GTT for hx and also AMA  She already has her NT appt scheduled for 5/19  PN1 visit scheduled  The patient was oriented to our practice, reviewed delivering physicians and [place] for Delivery  All questions were answered      Interviewed by: Jen Jeronimo

## 2021-05-05 NOTE — PATIENT INSTRUCTIONS
Nausea and Vomiting in Pregnancy   AMBULATORY CARE:   Nausea and vomiting in pregnancy  can happen any time of day  These symptoms usually start before the 9th week of pregnancy, and end by the 14th week (second trimester)  Some women can have nausea and vomiting for a longer time  These symptoms can affect some women throughout the entire pregnancy  Nausea and vomiting do not harm your baby  These symptoms can make it hard for you to do your daily activities  Seek care immediately if:   · You have signs of dehydration  Examples are dark yellow urine, dry mouth and lips, dry skin, fast heartbeat, and urinating less than usual     · You have severe abdominal pain  · You feel too weak or dizzy to stand up  · You see blood in your vomit or bowel movements  Contact your healthcare provider if:   · You vomit more than 4 times in 1 day  · You have not been able to keep liquids down for more than 1 day  · You lose more than 2 pounds  · You have a fever  · Your nausea and vomiting continue longer than 14 weeks  · You have questions or concerns about your condition or care  Treatment  for nausea and vomiting in pregnancy is usually not needed  You can make changes in the foods you eat and in your activities to help manage your symptoms  You may need to try several things to learn what works for you  Talk to your healthcare provider if your symptoms do not decrease with the changes suggested below  You may need vitamin B6 and medicine if these changes do not help, or your symptoms become severe  Nutrition changes you can make to manage nausea and vomiting:   · Eat small meals throughout the day instead of 3 large meals  You may be more likely to have nausea and vomiting when your stomach is empty  Eat foods that are low in fat and high in protein  Examples are lean meat, beans, turkey, and chicken without the skin   Eat a small snack, such as crackers, dry cereal, or a small sandwich before you go to bed  · Eat some crackers or dry toast before you get out of bed in the morning  Get out of bed slowly  Sudden movements could cause you to get dizzy and nauseated  · Eat bland foods when you feel nauseated  Examples of bland foods include dry toast, dry cereal, plain pasta, white rice, and bread  Other bland foods include saltine crackers, bananas, gelatin, and pretzels  Avoid spicy, greasy, and fried foods  Avoid any other foods that make you feel nauseated  · Drink liquids that contain ginger  Drink ginger ale made with real ginger or ginger tea made with fresh grated ginger  Ginger capsules or ginger candies may also help to decrease nausea and vomiting  · Drink liquids between meals instead of with meals  Wait at least 30 minutes after you eat to drink liquids  Drink small amounts of liquids often throughout the day to prevent dehydration  Ask how much liquid you should drink each day  Other changes you can make to manage nausea and vomiting:   · Avoid smells that bother you  Strong odors may cause nausea and vomiting to start, or make it worse  Take a short walk, turn on a fan, or try to sleep with the window open to get fresh air  When you are cooking, open windows to get rid of smells that may cause nausea  · Do not brush your teeth right after you eat  if it makes you nauseated  · Rest when you need to  Start activity slowly and work up to your usual routine as you start to feel better  · Talk to your healthcare provider about your prenatal vitamins  Prenatal vitamins can cause nausea for some women  Try taking your prenatal vitamin at night or with a snack  If this change does not help, your healthcare provider may recommend a different type of vitamin  · Do not use any medicines, vitamins, or supplements to manage your symptoms without asking your healthcare provider  Many medicines can harm an unborn baby       · Light to moderate exercise  may help to decrease your symptoms  It may also help you to sleep better at night  Ask your healthcare provider about the best exercise plan for you  Follow up with your healthcare provider as directed:  Write down your questions so you remember to ask them during your visits  © Copyright [address] 2021 Information is for End User's use only and may not be sold, redistributed or otherwise used for commercial purposes  All illustrations and images included in CareNotes® are the copyrighted property of [employer] Inc  or [employer]   The above information is an  only  It is not intended as medical advice for individual conditions or treatments  Talk to your doctor, nurse or pharmacist before following any medical regimen to see if it is safe and effective for you  Gestational Diabetes   AMBULATORY CARE:   Gestational diabetes (GDM)  is a type of diabetes that develops during pregnancy, usually in the second or third trimester  GDM causes your blood sugar level to rise too high  This can harm you and your unborn baby  Blood sugar levels usually go back to normal after the baby is born  The cause of GDM is not known  Hormones made by the placenta may cause insulin resistance  Insulin helps move sugar out of the blood so it can be used for energy  Insulin resistance means your pancreas makes insulin, but your body cannot use it  As the placenta grows, more of these hormones are produced  The hormones block insulin and cause your blood sugar level to rise  Signs and symptoms of GDM include the following:   · More hunger or thirst than usual    · Having to urinate often    · Blurred vision    · Feeling more tired than usual    · Bladder, vagina, or skin infections that happen often    · More weight gain than your healthcare provider suggests during your pregnancy    · Nausea or vomiting    Seek care immediately if:   · Your heartbeat is fast and weak, or your breathing is fast and shallow      · You are more sleepy than usual or become confused  · You have blurred or double vision  · Your breath has a fruity, sweet smell  · You are shaking or sweating  · Your blood sugar level is below 70 mg/dL or above 250 mg/dL and does not improve with treatment  · You have a headache, or you are dizzy  Call your doctor or diabetes care team if:   · You think your baby is not moving as much as usual     · You have more hunger or thirst than usual     · You are urinating more often than usual     · You have an upset stomach and are vomiting  · You have questions or concerns about your condition or care  The following may increase your risk for GDM:   · Lack of physical activity, such as exercise    · A family history of diabetes    · A history of high blood sugar, blood pressure, or cholesterol levels    · Being overweight or obese    · Past delivery of a large baby    · Glycosuria (sugar in your urine) or polycystic ovary syndrome (PCOS)    · Being [place] American, , , [place] American, or [place] heritage    Manage GDM:  GDM may be controlled with meal planning and physical activity  The goal is to keep your blood sugar level as close to normal, as safely as possible  Your healthcare provider and dietitian will help set up a meal and activity plan for you  · Follow your meal plan as directed  Talk to a dietitian or healthcare provider about the best meal plan for you  He or she may recommend that you eat 3 small meals and 2 to 4 snacks every day  Control the amount of carbohydrates (such as bread, cereal, and fruit) you eat at each meal and snack  Too much carbohydrate in 1 meal or snack can cause your blood sugar to rise to a high level  Your dietitian or provider will tell you how much carbohydrate to eat at each meal and snack  Eat foods that are a good source of fiber, such as vegetables and legumes (beans and lentils)           · Ask your provider about the best physical activity plan for you  Physical activity helps keep your blood sugar level steady  A good goal is to be active for at least 30 minutes, 5 days a week  Low-impact activities such as walking or swimming are effective  Insulin  may be needed if your diabetes is not controlled by nutrition and exercise  Insulin is safe to use during pregnancy  Check your blood sugar level as directed:   · You will be taught how to check a small drop of blood in a glucose monitor  Ask your healthcare provider when and how often to check your blood sugar level during the day  You may need to check your blood sugar level at least 3 times each day  · Ask your provider what your blood sugar levels should be before and after you eat  He or she may suggest that your blood sugar level should be at or below 95 mg/dL before you eat  The level may need to be at or below 140 mg/dL 1 hour after you eat or at or below 120 mg/dL 2 hours after you eat  Your provider may give you higher target levels if you are at risk for hypoglycemia  Write down your results, and show them to your provider  He or she may use the results to make changes to your medicine, food, and physical activity schedules  What else you need to know about GDM:   · Your A1c level may be checked  A hemoglobin A1c is a blood test that measures your average blood sugar level for the past 2 to 3 months  It is also called an HbA1c or glycohemoglobin test  The level is given as a percentage  An A1c of 6% or lower is usually recommended during pregnancy  If you are at risk for hypoglycemia, your goal may be 7%  Changes to your nutrition, physical activity, or medicine plan may be made to help you reach your goal  Your provider may recommend that you have your A1c checked 1 time each month  · Check your blood pressure often  High blood pressure can cause problems with your health and your pregnancy  Blood pressure readings are usually written as 2 numbers   Your systolic blood pressure (the first number) should be between 110 and 129  Your diastolic blood pressure (the second number) should be between 65 and 79          · Maintain a healthy weight  A healthy weight can help you control your GDM  Ask your healthcare provider how much weight is healthy for you to gain during your pregnancy  If you were overweight before you became pregnant, he or she may recommend a safe weight loss plan during pregnancy  Your provider or a dietitian can help you create a healthy meal plan for you and your baby  Do not  try to go on a crash diet or try to lose weight without your provider's approval  You may not get enough calories or nutrients for you and your baby  · Do not smoke  Nicotine is dangerous for you and your baby and can make it harder to manage GDM  Do not use e-cigarettes or smokeless tobacco in place of cigarettes or to help you quit  They still contain nicotine  Ask your provider for information if you currently smoke and need help quitting  · You may need diabetes screening after you give birth  Screening may be done 4 to 12 weeks after your baby is born  This is to check if you have developed diabetes, problems with your fasting glucose levels, or glucose intolerance  You may need other tests or treatment if you have any of these  Testing may be repeated every 1 to 3 years if you had GDM but normal tests within 12 weeks of giving birth  Follow up with your doctor or diabetes care team as directed: You will need to have screening tests for diabetes 4 to 12 weeks after you have your baby  You may also need to have tests for diabetes every 1 to 3 years for life  Write down your questions so you remember to ask them during your visits  © Copyright [employer] 2021 Information is for End User's use only and may not be sold, redistributed or otherwise used for commercial purposes   All illustrations and images included in CareNotes® are the copyrighted property of [employer]  or [address]   The above information is an  only  It is not intended as medical advice for individual conditions or treatments  Talk to your doctor, nurse or pharmacist before following any medical regimen to see if it is safe and effective for you  Pregnancy   AMBULATORY CARE:   What you need to know about pregnancy:  A normal pregnancy lasts about 40 weeks  The first trimester lasts from your last period through the 12th week of pregnancy  The second trimester lasts from the 13th week through the 23rd week  The third trimester lasts from the 24th week until your baby is born  If you know the date of your last period, your healthcare provider can estimate your due date  You may give birth to your baby any time from 37 weeks to 2 weeks after your due date  Seek care immediately if:   · You develop a severe headache that does not go away  · You have new or increased vision changes, such as blurred or spotted vision  · You have new or increased swelling in your face or hands  · You have pain or cramping in your abdomen or low back  · You have vaginal bleeding  Call your doctor or obstetrician if:   · You have abdominal cramps, pressure, or tightening  · You have a change in vaginal discharge  · You cannot keep food or drinks down, and you are losing weight  · You have chills or a fever  · You have vaginal itching, burning, or pain  · You have yellow, green, white, or foul-smelling vaginal discharge  · You have pain or burning when you urinate, less urine than usual, or pink or bloody urine  · You have questions or concerns about your condition or care  Prenatal care:  Prenatal care is a series of visits with your healthcare provider throughout your pregnancy  Prenatal care can help prevent problems during pregnancy and childbirth  At each prenatal visit, your provider will weigh you and check your blood pressure   He or she will also check your baby's heartbeat and growth  You may also need the following at some visits:  · A pelvic exam  allows your healthcare provider to see your cervix (the bottom part of your uterus)  Your healthcare provider will use a speculum to open your vagina  He or she will check the size and shape of your uterus  At your first prenatal visit, you may also have a Pap smear  This is a test to check your cervix for abnormal cells  · Blood tests  may be done to check for any of the following:     ? Gestational diabetes or anemia (low iron level)    ? Blood type or Rh factor, or certain birth defects    ? Immunity to certain diseases, such as chickenpox or rubella    ? An infection, such as a sexually transmitted infection, HIV, or hepatitis B    · Hepatitis B  may need to be prevented or treated  Hepatitis B is inflammation of the liver caused by the hepatitis B virus (HBV)  HBV can spread from a mother to her baby during delivery  You will be checked for HBV as early as possible in the first trimester of each pregnancy  You need the test even if you received the hepatitis B vaccine or were tested before  You may need to have an HBV infection treated before you give birth  · Urine tests  may also be done to check for sugar and protein  These can be signs of gestational diabetes or preeclampsia  Urine tests may also be done to check for signs of infection  · A fetal ultrasound  shows pictures of your baby inside your uterus  The pictures are used to check your baby's development, movement, and position  · Genetic disorder screening tests  may be offered to you  These tests check your baby's risk for genetic disorders such as Down syndrome  A screening test may include blood tests and an ultrasound  Blood tests may be used to check your DNA or your partner's DNA  Genetic tests are not always accurate or complete  Your baby may be born with a genetic disorder that did not show up in the tests   Talk to your healthcare provider about any concerns you have with genetic testing  Body changes that may occur during your pregnancy:   · Breast changes  you will experience include tenderness and tingling during the early part of your pregnancy  Your breasts will become larger  You may need to use a support bra  You may see a thin, yellow fluid, called colostrum, leak from your nipples during the second trimester  Colostrum is a liquid that changes to milk about 3 days after you give birth  · Skin changes and stretch marks  may occur during your pregnancy  You may have red marks, called stretch marks, on your skin  Stretch marks will usually fade after pregnancy  Use lotion if your skin is dry and itchy  The skin on your face, around your nipples, and below your belly button may darken  Most of the time, your skin will return to its normal color after your baby is born  · Morning sickness  is nausea and vomiting that can happen at any time of day  Avoid fatty and spicy foods  Eat small meals throughout the day instead of large meals  Genesis may help to decrease nausea  Ask your healthcare provider about other ways of decreasing nausea and vomiting  · Heartburn  may be caused by changes in your hormones during pregnancy  Your growing uterus may also push your stomach upward and force stomach acid to back up into your esophagus  Eat 4 or 5 small meals each day instead of large meals  Avoid spicy foods  Avoid eating right before bedtime  · Constipation  may develop during your pregnancy  To treat constipation, eat foods high in fiber such as fiber cereals, beans, fruits, vegetables, whole-grain breads, and prune juice  Get regular exercise and drink plenty of water  Your healthcare provider may also suggest a fiber supplement to soften your bowel movements  Talk to your healthcare provider before you use any medicines to decrease constipation  · Hemorrhoids  are enlarged veins in the rectal area   They may cause pain, itching, and bright red bleeding from your rectum  To decrease your risk for hemorrhoids, prevent constipation and do not strain to have a bowel movement  If you have hemorrhoids, soak in a tub of warm water to ease discomfort  Ask your healthcare provider how you can treat hemorrhoids  · Leg cramps and swelling  may be caused by low calcium levels or the added weight of pregnancy  Raise your legs above the level of your heart to decrease swelling  During a leg cramp, stretch or massage the muscle that has the cramp  Heat may help decrease pain and muscle spasms  Apply heat on your muscle for 20 to 30 minutes every 2 hours for as many days as directed  · Back pain  may occur as your baby grows  Do not stand for long periods of time or lift heavy items  Use good posture while you stand, squat, or bend  Wear low-heeled shoes with good support  Rest may also help to relieve back pain  Ask your healthcare provider about exercises you can do to strengthen your back muscles  Stay healthy during your pregnancy:   · Eat a variety of healthy foods  Healthy foods include fruits, vegetables, whole-grain breads, low-fat dairy foods, beans, lean meats, and fish  Drink liquids as directed  Ask how much liquid to drink each day and which liquids are best for you  Limit caffeine to less than 200 milligrams each day  Limit your intake of fish to 2 servings each week  Choose fish low in mercury such as canned light tuna, shrimp, crab, salmon, cod, or tilapia  Do not  eat fish high in mercury such as swordfish, tilefish, vern mackerel, and shark  · Take prenatal vitamins as directed  Your need for certain vitamins and minerals, such as folic acid, increases during pregnancy  Prenatal vitamins provide some of the extra vitamins and minerals you need  Prenatal vitamins may also help to decrease the risk for certain birth defects  · Ask how much weight you should gain during your pregnancy    Too much or too little weight gain can be unhealthy for you and your baby  · Talk to your healthcare provider about exercise  Moderate exercise can help you stay fit  Your healthcare provider will help you plan an exercise program that is safe for you during pregnancy  · Do not smoke  Smoking increases your risk for a miscarriage and heart and blood vessel problems  Smoking can cause your baby to be born too early or weigh less at birth  Quit smoking as soon as you think you might be pregnant  Ask your healthcare provider for information if you need help quitting  · Do not drink alcohol  Alcohol passes from your body to your baby through the placenta  It can affect your baby's brain development and cause fetal alcohol syndrome (FAS)  FAS is a group of conditions that causes mental, behavior, and growth problems  · Talk to your healthcare provider before you take any medicines  Many medicines may harm your baby if you take them when you are pregnant  Do not take any medicines, vitamins, herbs, or supplements without first talking to your healthcare provider  Never use illegal or street drugs (such as marijuana or cocaine) while you are pregnant  Safety tips:   · Avoid hot tubs and saunas  Do not use a hot tub or sauna while you are pregnant, especially during your first trimester  Hot tubs and saunas may raise your baby's temperature and increase the risk for birth defects  · Avoid toxoplasmosis  This is an infection caused by eating raw meat or being around infected cat feces  It can cause birth defects, miscarriages, and other problems  Wash your hands after you touch raw meat  Make sure any meat is well-cooked before you eat it  Avoid raw eggs and unpasteurized milk  Use gloves or ask someone else to clean your cat's litter box while you are pregnant  · Ask your healthcare provider about travel  The most comfortable time to travel is during the second trimester   Ask your healthcare provider if you can travel after 36 weeks  You may not be able to travel in an airplane after 36 weeks  He or she may also recommend that you avoid long road trips  Follow up with your doctor or obstetrician as directed:  Go to all of your prenatal visits during your pregnancy  Write down your questions so you remember to ask them during your visits  © Copyright [employer] 2021 Information is for End User's use only and may not be sold, redistributed or otherwise used for commercial purposes  All illustrations and images included in CareNotes® are the copyrighted property of [employer]  or [place] [employer]   The above information is an  only  It is not intended as medical advice for individual conditions or treatments  Talk to your doctor, nurse or pharmacist before following any medical regimen to see if it is safe and effective for you

## 2021-05-12 ENCOUNTER — APPOINTMENT (OUTPATIENT)
Dept: LAB | Facility: HOSPITAL | Age: 36
End: 2021-05-12
Payer: COMMERCIAL

## 2021-05-12 DIAGNOSIS — Z34.81 PRENATAL CARE, SUBSEQUENT PREGNANCY, FIRST TRIMESTER: ICD-10-CM

## 2021-05-12 LAB
ABO GROUP BLD: NORMAL
AMPHETAMINES SERPL QL SCN: NEGATIVE
BACTERIA UR QL AUTO: NORMAL /HPF
BARBITURATES UR QL: NEGATIVE
BASOPHILS # BLD AUTO: 0.03 THOUSANDS/ΜL (ref 0–0.1)
BASOPHILS NFR BLD AUTO: 0 % (ref 0–1)
BENZODIAZ UR QL: NEGATIVE
BILIRUB UR QL STRIP: NEGATIVE
BLD GP AB SCN SERPL QL: NEGATIVE
CLARITY UR: CLEAR
COCAINE UR QL: NEGATIVE
COLOR UR: YELLOW
EOSINOPHIL # BLD AUTO: 0.07 THOUSAND/ΜL (ref 0–0.61)
EOSINOPHIL NFR BLD AUTO: 1 % (ref 0–6)
ERYTHROCYTE [DISTWIDTH] IN BLOOD BY AUTOMATED COUNT: 13.6 % (ref 11.6–15.1)
GLUCOSE 1H P 50 G GLC PO SERPL-MCNC: 136 MG/DL (ref 40–134)
GLUCOSE UR STRIP-MCNC: NEGATIVE MG/DL
HBV SURFACE AG SER QL: NORMAL
HCT VFR BLD AUTO: 39.2 % (ref 34.8–46.1)
HGB BLD-MCNC: 13 G/DL (ref 11.5–15.4)
HGB UR QL STRIP.AUTO: NEGATIVE
IMM GRANULOCYTES # BLD AUTO: 0.05 THOUSAND/UL (ref 0–0.2)
IMM GRANULOCYTES NFR BLD AUTO: 1 % (ref 0–2)
KETONES UR STRIP-MCNC: NEGATIVE MG/DL
LEUKOCYTE ESTERASE UR QL STRIP: NEGATIVE
LYMPHOCYTES # BLD AUTO: 2.99 THOUSANDS/ΜL (ref 0.6–4.47)
LYMPHOCYTES NFR BLD AUTO: 28 % (ref 14–44)
MCH RBC QN AUTO: 29.3 PG (ref 26.8–34.3)
MCHC RBC AUTO-ENTMCNC: 33.2 G/DL (ref 31.4–37.4)
MCV RBC AUTO: 89 FL (ref 82–98)
METHADONE UR QL: NEGATIVE
MONOCYTES # BLD AUTO: 0.4 THOUSAND/ΜL (ref 0.17–1.22)
MONOCYTES NFR BLD AUTO: 4 % (ref 4–12)
NEUTROPHILS # BLD AUTO: 7.12 THOUSANDS/ΜL (ref 1.85–7.62)
NEUTS SEG NFR BLD AUTO: 66 % (ref 43–75)
NITRITE UR QL STRIP: NEGATIVE
NON-SQ EPI CELLS URNS QL MICRO: NORMAL /HPF
NRBC BLD AUTO-RTO: 0 /100 WBCS
OPIATES UR QL SCN: NEGATIVE
OXYCODONE+OXYMORPHONE UR QL SCN: NEGATIVE
PCP UR QL: NEGATIVE
PH UR STRIP.AUTO: 6.5 [PH]
PLATELET # BLD AUTO: 341 THOUSANDS/UL (ref 149–390)
PMV BLD AUTO: 8.9 FL (ref 8.9–12.7)
PROT UR STRIP-MCNC: NEGATIVE MG/DL
RBC # BLD AUTO: 4.43 MILLION/UL (ref 3.81–5.12)
RBC #/AREA URNS AUTO: NORMAL /HPF
RH BLD: POSITIVE
RUBV IGG SERPL IA-ACNC: 77.4 IU/ML
SP GR UR STRIP.AUTO: 1.01 (ref 1–1.03)
SPECIMEN EXPIRATION DATE: NORMAL
THC UR QL: POSITIVE
UROBILINOGEN UR QL STRIP.AUTO: 0.2 E.U./DL
WBC # BLD AUTO: 10.66 THOUSAND/UL (ref 4.31–10.16)
WBC #/AREA URNS AUTO: NORMAL /HPF

## 2021-05-12 PROCEDURE — 80081 OBSTETRIC PANEL INC HIV TSTG: CPT

## 2021-05-12 PROCEDURE — 82950 GLUCOSE TEST: CPT

## 2021-05-12 PROCEDURE — 81001 URINALYSIS AUTO W/SCOPE: CPT

## 2021-05-12 PROCEDURE — 80307 DRUG TEST PRSMV CHEM ANLYZR: CPT

## 2021-05-12 PROCEDURE — 36415 COLL VENOUS BLD VENIPUNCTURE: CPT

## 2021-05-12 PROCEDURE — 87086 URINE CULTURE/COLONY COUNT: CPT

## 2021-05-13 ENCOUNTER — TELEPHONE (OUTPATIENT)
Dept: OBGYN CLINIC | Facility: CLINIC | Age: 36
End: 2021-05-13

## 2021-05-13 DIAGNOSIS — R73.09 ELEVATED GLUCOSE LEVEL: Primary | ICD-10-CM

## 2021-05-13 LAB
BACTERIA UR CULT: NORMAL
HIV 1+2 AB+HIV1 P24 AG SERPL QL IA: NORMAL
RPR SER QL: NORMAL

## 2021-05-13 NOTE — TELEPHONE ENCOUNTER
----- Message from Belgica Morton sent at 5/13/2021  7:52 AM EDT -----  Please let pt know that her 1 hour is elevated and she will need a 3 hour GTT done  Please help schedule  THC positive, review not to use during pregnancy  Rubella immune, Hep B Neg, UA & CBC wnl  Thanks!

## 2021-05-13 NOTE — TELEPHONE ENCOUNTER
Patient aware 1 hour Glucose test was abnormal  Order for 3 hour Glucose test placed  This test is only done at [facility] based labs and not done in out patient setting  If going to a [address] [place] [address] lab no appt is necessary, however we advise arriving by 9:00 a m  Please do not eat or drink anything past midnight  You may drink 1 glass of water prior to arriving  Please bring healthy snacks so that you may have something to eat when you are able to  Bring reading material or something to occupy your time as you will be there for 3 hours  Advised all other labs are normal  Advised not to use marijuana during pregnancy

## 2021-05-18 ENCOUNTER — ROUTINE PRENATAL (OUTPATIENT)
Dept: PERINATAL CARE | Facility: OTHER | Age: 36
End: 2021-05-18
Payer: COMMERCIAL

## 2021-05-18 VITALS
SYSTOLIC BLOOD PRESSURE: 118 MMHG | BODY MASS INDEX: 34.16 KG/M2 | HEIGHT: 60 IN | WEIGHT: 174 LBS | DIASTOLIC BLOOD PRESSURE: 75 MMHG | HEART RATE: 79 BPM

## 2021-05-18 DIAGNOSIS — O09.529 ANTEPARTUM MULTIGRAVIDA OF ADVANCED MATERNAL AGE: Primary | ICD-10-CM

## 2021-05-18 DIAGNOSIS — O99.211 OBESITY COMPLICATING PREGNANCY, FIRST TRIMESTER: ICD-10-CM

## 2021-05-18 DIAGNOSIS — O35.2XX0 HEREDITARY DISEASE IN FAMILY POSSIBLY AFFECTING FETUS, AFFECTING MANAGEMENT OF MOTHER IN PREGNANCY, SINGLE OR UNSPECIFIED FETUS: ICD-10-CM

## 2021-05-18 DIAGNOSIS — Z36.82 NUCHAL TRANSLUCENCY OF FETUS ON PRENATAL ULTRASOUND: ICD-10-CM

## 2021-05-18 DIAGNOSIS — O34.211 MATERNAL CARE DUE TO LOW TRANSVERSE UTERINE SCAR FROM PREVIOUS CESAREAN DELIVERY: ICD-10-CM

## 2021-05-18 DIAGNOSIS — Z3A.12 12 WEEKS GESTATION OF PREGNANCY: ICD-10-CM

## 2021-05-18 PROCEDURE — 76801 OB US < 14 WKS SINGLE FETUS: CPT | Performed by: OBSTETRICS & GYNECOLOGY

## 2021-05-18 PROCEDURE — 99241 PR OFFICE CONSULTATION NEW/ESTAB PATIENT 15 MIN: CPT | Performed by: OBSTETRICS & GYNECOLOGY

## 2021-05-18 PROCEDURE — 76813 OB US NUCHAL MEAS 1 GEST: CPT | Performed by: OBSTETRICS & GYNECOLOGY

## 2021-05-18 RX ORDER — ASPIRIN 81 MG/1
162 TABLET, CHEWABLE ORAL DAILY
Qty: 60 TABLET | Refills: 6 | Status: SHIPPED | OUTPATIENT
Start: 2021-05-18 | End: 2021-10-31 | Stop reason: HOSPADM

## 2021-05-18 NOTE — LETTER
May 19, 2021     Corrie Rios, [address] [facility] [address]    Patient: Yariel Becerril   YOB: 1985   Date of Visit: 2021       Dear Dr Nisreen Nguyen: Thank you for referring Yariel Becerril to me for evaluation  Below are my notes for this consultation  If you have questions, please do not hesitate to call me  I look forward to following your patient along with you  Sincerely,        John Rivera MD        CC: MD John Ritter MD  2021  4:22 AM  Sign when Signing Visit  OFFICE CONSULT  Referring physician:   Corrie Rios, Crnp  [address] [facility]      Dear Judith Apgar      Thank you for requesting a  consultation on your patient Ms Yariel Becerril for the following indications:  Genetic screening     disregard early ultrasound    Eddie Meza reports that she is on prenatal gummies with iron and denies any known drug allergies  She has a medical history that includes prior gestational diabetes and her BMI is 29 and she is advanced maternal age at age 29  She does not report any surgical history  She denies substance use  She reports a family history of diabetes  She states that her prior pregnancy developed gestational diabetes that required medication and she was induced at 44 weeks and after a prolonged induction required an emergency  for what sounds like nonreassuring fetal testing in   Her baby weighed 6lb 5 oz  She was diagnosed with group B strep in that pregnancy  I tried to review the records in epic but the full records are not present from  in the electronic chart  She did not report any fetal infection with GBS  So far her Glucola has returned as abnormal and she is aware she needs to complete her 3 hour glucose tolerance test  She is unsure if she wants to   Ultrasound findings:  The ultrasound shows a fetus concordant with dates and not concordant with her early ultrasound  The nasal bone and nuchal translucency appears normal  No malformations are seen on today's early ultrasound  The patient was informed of the findings and counseled about the limitations of the exam in detecting all forms of fetal congenital abnormalities  She does not report any vaginal bleeding or uterine cramping or contractions  Specific counseling was provided on the following problems:  1  We discussed the options for genetic screening which include invasive testing on the fetal placenta or on fetal skin cells within the amniotic fluid and compared this to noninvasive testing which includes cell free DNA screening and the sequential screen  We reviewed the risks, the benefits and the limitations of each  In the end patient chose to complete the cell free DNA screen  2  Her prior US dating would have changed her SKYLER but this does not correlate with our scan today that matches her lmp  Will use her LMP to generate her SKYLER  3   mg was encouraged to decrease her risk to develop preeclampsia  4   I recommend the Covid vaccine in pregnancy  The benefits of COVID vaccination in pregnancy is to decrease the mothers risk of developing a Covid infection  A severe Covid infection in pregnancy can cause an increased risk for a  delivery  The vaccine also benefits the baby in that the same antibodies she develops can cross the placenta and cross her breast milk to give the baby some protection after birth  Levels of antibodies developed after a vaccine are higher than those that develope after a natural infection thus providing even further benefit to the baby  Future tests recommended:  1  The results of her NIPT will return in 7-10 days and her OB office will order an MSAFP screen at 16-18 weeks to screen for spina bifida     2  As I was closing her note after she left the office I noted her brother has passed and he had a diagnosis of muscular dystrophy  She needs to be offered genetic counseling if not completed last pregnancy as she may be a carrier of this X linked gene disorder  Future ultrasounds ordered today:   1  Fetal Level II ultrasound imaging is recommended at 19-20 weeks' gestation        The face to face time, in addition to time spent discussing ultrasound results, was approximately 15 minutes, greater than 50% of which was spent during counseling and coordination of care    Rita La MD

## 2021-05-18 NOTE — PROGRESS NOTES
OFFICE CONSULT  Referring physician:   Giovany Turner  [address] [address]      Dear Manda Zavala      Thank you for requesting a  consultation on your patient Ms Krista Fontenot for the following indications:  Genetic screening     disregard early ultrasound    Kendra Dangelo reports that she is on prenatal gummies with iron and denies any known drug allergies  She has a medical history that includes prior gestational diabetes and her BMI is 29 and she is advanced maternal age at age 29  She does not report any surgical history  She denies substance use  She reports a family history of diabetes  She states that her prior pregnancy developed gestational diabetes that required medication and she was induced at 44 weeks and after a prolonged induction required an emergency  for what sounds like nonreassuring fetal testing in   Her baby weighed 6lb 5 oz  She was diagnosed with group B strep in that pregnancy  I tried to review the records in epic but the full records are not present from  in the electronic chart  She did not report any fetal infection with GBS  So far her Glucola has returned as abnormal and she is aware she needs to complete her 3 hour glucose tolerance test  She is unsure if she wants to   Ultrasound findings: The ultrasound shows a fetus concordant with dates and not concordant with her early ultrasound  The nasal bone and nuchal translucency appears normal  No malformations are seen on today's early ultrasound  The patient was informed of the findings and counseled about the limitations of the exam in detecting all forms of fetal congenital abnormalities  She does not report any vaginal bleeding or uterine cramping or contractions  Specific counseling was provided on the following problems:  1   We discussed the options for genetic screening which include invasive testing on the fetal placenta or on fetal skin cells within the amniotic fluid and compared this to noninvasive testing which includes cell free DNA screening and the sequential screen  We reviewed the risks, the benefits and the limitations of each  In the end patient chose to complete the cell free DNA screen  2  Her prior US dating would have changed her SKYLER but this does not correlate with our scan today that matches her lmp  Will use her LMP to generate her SKYLER  3   mg was encouraged to decrease her risk to develop preeclampsia  4   I recommend the Covid vaccine in pregnancy  The benefits of COVID vaccination in pregnancy is to decrease the mothers risk of developing a Covid infection  A severe Covid infection in pregnancy can cause an increased risk for a  delivery  The vaccine also benefits the baby in that the same antibodies she develops can cross the placenta and cross her breast milk to give the baby some protection after birth  Levels of antibodies developed after a vaccine are higher than those that develope after a natural infection thus providing even further benefit to the baby  Future tests recommended:  1  The results of her NIPT will return in 7-10 days and her OB office will order an MSAFP screen at 16-18 weeks to screen for spina bifida  2  As I was closing her note after she left the office I noted her brother has passed and he had a diagnosis of muscular dystrophy  She needs to be offered genetic counseling if not completed last pregnancy as she may be a carrier of this X linked gene disorder  Future ultrasounds ordered today:   1  Fetal Level II ultrasound imaging is recommended at 19-20 weeks' gestation        The face to face time, in addition to time spent discussing ultrasound results, was approximately 15 minutes, greater than 50% of which was spent during counseling and coordination of care    Greta Harvey MD

## 2021-05-18 NOTE — PATIENT INSTRUCTIONS
Based on my review of the information below,  I would encourage vaccination to prevent patients from developing a severe COVID virus infection and the resultant maternal and fetal complications that can arise with a severe infection  COVID-19 mRNA vaccines have been found to generate Covid antibodies in pregnant and lactating women similar to that observed in non-pregnant women  Vaccine-induced antibody levels were significantly greater than the levels found in response to natural infection  Immune transfer of these antibodies to neonates if found to occur via the placenta and breast milk  CDCs summary on the COVID vaccination in pregnancy and breast feeding: [employer] is  html     Miryam saldaña    [email or web address] [email or web address]

## 2021-05-18 NOTE — LETTER
May 19, 2021     Lorraine Prince, [address] [facility] [address]    Patient: Dakota Carrero   YOB: 1985   Date of Visit: 2021       Dear Dr Magalie Domingo: Thank you for referring Dakota Carrero to me for evaluation  Below are my notes for this consultation  If you have questions, please do not hesitate to call me  I look forward to following your patient along with you  Sincerely,        Ruslan Martinez MD        CC: No Recipients  Ruslan Martinez MD  2021  4:12 AM  Sign when Signing Visit  OFFICE CONSULT  Referring physician:   Lorraine Prince, Giovany  [address]      Dear Noman Shipman      Thank you for requesting a  consultation on your patient Ms Dakota Carrero for the following indications:  Genetic screening     disregard early ultrasound    Tasialeonidas [facility] reports that she is on prenatal gummies with iron and denies any known drug allergies  She has a medical history that includes prior gestational diabetes and her BMI is 29 and she is advanced maternal age at age 29  She does not report any surgical history  She denies substance use  She reports a family history of diabetes  She states that her prior pregnancy developed gestational diabetes that required medication and she was induced at 44 weeks and after a prolonged induction required an emergency  for what sounds like nonreassuring fetal testing in   Her baby weighed 6lb 5 oz  She was diagnosed with group B strep in that pregnancy  I tried to review the records in epic but the full records are not present from  in the electronic chart  She did not report any fetal infection with GBS  So far her Glucola has returned as abnormal and she is aware she needs to complete her 3 hour glucose tolerance test  She is unsure if she wants to   Ultrasound findings:  The ultrasound shows a fetus concordant with dates and not concordant with her early ultrasound  The nasal bone and nuchal translucency appears normal  No malformations are seen on today's early ultrasound  The patient was informed of the findings and counseled about the limitations of the exam in detecting all forms of fetal congenital abnormalities  She does not report any vaginal bleeding or uterine cramping or contractions  Specific counseling was provided on the following problems:  1  We discussed the options for genetic screening which include invasive testing on the fetal placenta or on fetal skin cells within the amniotic fluid and compared this to noninvasive testing which includes cell free DNA screening and the sequential screen  We reviewed the risks, the benefits and the limitations of each  In the end patient chose to complete the cell free DNA screen  2  Her prior US dating would have changed her SKYLER but this does not correlate with our scan today that matches her lmp  Will use her LMP to generate her SKYLER  3   mg was encouraged to decrease her risk to develop preeclampsia  4   I recommend the Covid vaccine in pregnancy  The benefits of COVID vaccination in pregnancy is to decrease the mothers risk of developing a Covid infection  A severe Covid infection in pregnancy can cause an increased risk for a  delivery  The vaccine also benefits the baby in that the same antibodies she develops can cross the placenta and cross her breast milk to give the baby some protection after birth  Levels of antibodies developed after a vaccine are higher than those that develope after a natural infection thus providing even further benefit to the baby  Future tests recommended:  1  The results of her NIPT will return in 7-10 days and her OB office will order an MSAFP screen at 16-18 weeks to screen for spina bifida  Future ultrasounds ordered today:   1  Fetal Level II ultrasound imaging is recommended at 19-20 weeks' gestation        The face to face time, in addition to time spent discussing ultrasound results, was approximately 15 minutes, greater than 50% of which was spent during counseling and coordination of care    Jessica Hanna MD

## 2021-05-18 NOTE — PROGRESS NOTES
[id number] lab ordered  Instructed patient on process for checking her OOP cost via [employer] [facility]  Provided [id number] instruction card toll free # [phone]  Patient made aware if [id number]  unable to give an estimate she will need to contact [facility] office prior to blood draw  Patient aware that  is provided by third party and is only an estimate of cost not a guarantee  Insurance may require prior authorization, if test drawn without prior authorization she will be responsible for full cost of test   For definitive OOP cost, lab deductible or if lab authorization is required patient encouraged to call her insurance provider  Explained customer service insurance phone # located on the back of her ID card  Maternal Fetal Medicine will have results in approximately 7-10 business days and will call patient or notify via [address]  Patient aware viewing lab result reveals gender  **[id number] lab kit with prepaid [employer]  given to patient with instructions to wait until she hears from [facility] about her Prior Auth status before having the lab drawn  Patient verbalized understanding of all instructions and no questions at this time

## 2021-05-19 PROBLEM — O99.211 OBESITY COMPLICATING PREGNANCY, FIRST TRIMESTER: Status: ACTIVE | Noted: 2021-05-19

## 2021-05-19 PROBLEM — K81.0 ACUTE CHOLECYSTITIS: Status: RESOLVED | Noted: 2018-10-14 | Resolved: 2021-05-19

## 2021-05-19 PROBLEM — N91.2 AMENORRHEA: Status: RESOLVED | Noted: 2021-04-22 | Resolved: 2021-05-19

## 2021-05-19 PROBLEM — O34.211 MATERNAL CARE DUE TO LOW TRANSVERSE UTERINE SCAR FROM PREVIOUS CESAREAN DELIVERY: Status: ACTIVE | Noted: 2021-05-19

## 2021-05-19 PROBLEM — O35.2XX0 HEREDITARY DISEASE IN FAMILY POSSIBLY AFFECTING FETUS: Status: ACTIVE | Noted: 2021-05-19

## 2021-05-21 ENCOUNTER — TELEPHONE (OUTPATIENT)
Dept: PERINATAL CARE | Facility: CLINIC | Age: 36
End: 2021-05-21

## 2021-05-21 NOTE — TELEPHONE ENCOUNTER
Left [facility] for pt and informed her that prior authorization is not required for her NIPT  Pt instructed that she must use a lab that is in network for her insurance  And recommended she contact the  to determine her out of pocket cost   PT was instructed to contact office with questions

## 2021-05-21 NOTE — TELEPHONE ENCOUNTER
----- Message from Cam Eun sent at 5/21/2021 11:16 AM EDT -----  Shahid Garcia from [facility] is not requiere if is a par Lab  Reference # [id number]      ----- Message -----  From: Arnoldo Bojorquez RN  Sent: 5/18/2021  12:10 PM EDT  To: Annamaria Butler,       Could you please help this Pt  Start Prior Auth process for [employer]? She chose to use [facility] lab    Thank You,               [id number] [facility]

## 2021-05-24 ENCOUNTER — APPOINTMENT (OUTPATIENT)
Dept: LAB | Facility: HOSPITAL | Age: 36
End: 2021-05-24
Payer: COMMERCIAL

## 2021-05-24 ENCOUNTER — TRANSCRIBE ORDERS (OUTPATIENT)
Dept: ADMINISTRATIVE | Facility: HOSPITAL | Age: 36
End: 2021-05-24

## 2021-05-24 ENCOUNTER — INITIAL PRENATAL (OUTPATIENT)
Dept: OBGYN CLINIC | Facility: CLINIC | Age: 36
End: 2021-05-24
Payer: COMMERCIAL

## 2021-05-24 VITALS — SYSTOLIC BLOOD PRESSURE: 112 MMHG | DIASTOLIC BLOOD PRESSURE: 80 MMHG | WEIGHT: 174.6 LBS | BODY MASS INDEX: 34.1 KG/M2

## 2021-05-24 DIAGNOSIS — Z34.92 PREGNANCY, OBSTETRICAL CARE, SECOND TRIMESTER: Primary | ICD-10-CM

## 2021-05-24 DIAGNOSIS — O35.2XX0 HEREDITARY DISEASE IN FAMILY POSSIBLY AFFECTING FETUS, AFFECTING MANAGEMENT OF MOTHER IN PREGNANCY, SINGLE OR UNSPECIFIED FETUS: ICD-10-CM

## 2021-05-24 DIAGNOSIS — O09.529 ANTEPARTUM MULTIGRAVIDA OF ADVANCED MATERNAL AGE: ICD-10-CM

## 2021-05-24 DIAGNOSIS — O34.211 MATERNAL CARE DUE TO LOW TRANSVERSE UTERINE SCAR FROM PREVIOUS CESAREAN DELIVERY: ICD-10-CM

## 2021-05-24 DIAGNOSIS — F12.90 MARIJUANA USE: ICD-10-CM

## 2021-05-24 DIAGNOSIS — Z13.79 GENETIC TESTING: Primary | ICD-10-CM

## 2021-05-24 DIAGNOSIS — Z11.3 SCREENING FOR STDS (SEXUALLY TRANSMITTED DISEASES): ICD-10-CM

## 2021-05-24 DIAGNOSIS — Z13.79 GENETIC TESTING: ICD-10-CM

## 2021-05-24 DIAGNOSIS — O99.211 OBESITY COMPLICATING PREGNANCY, FIRST TRIMESTER: ICD-10-CM

## 2021-05-24 LAB
SL AMB  POCT GLUCOSE, UA: NORMAL
SL AMB POCT URINE PROTEIN: NORMAL

## 2021-05-24 PROCEDURE — 99213 OFFICE O/P EST LOW 20 MIN: CPT | Performed by: STUDENT IN AN ORGANIZED HEALTH CARE EDUCATION/TRAINING PROGRAM

## 2021-05-24 PROCEDURE — 87591 N.GONORRHOEAE DNA AMP PROB: CPT | Performed by: STUDENT IN AN ORGANIZED HEALTH CARE EDUCATION/TRAINING PROGRAM

## 2021-05-24 PROCEDURE — 87491 CHLMYD TRACH DNA AMP PROBE: CPT | Performed by: STUDENT IN AN ORGANIZED HEALTH CARE EDUCATION/TRAINING PROGRAM

## 2021-05-24 PROCEDURE — 36415 COLL VENOUS BLD VENIPUNCTURE: CPT

## 2021-05-24 NOTE — PROGRESS NOTES
Patient presents for her pn1  C/o occasional cramping and nausea  Pap- 6/2018 neg/-HPV  Cultures collected today  Reviewed blue folder

## 2021-05-24 NOTE — PROGRESS NOTES
29 y o   at 13w2d with SKYLER of Estimated Date of Delivery: 21 by LMP consistent w/ 1st trimester US  She has occasional nausea/vomiting and bleeding/cramping  Hepatitis B surface antigen: non reactive  RPR: non reactive  HIV: negative  Rubella: immune  Urine culture: no growth  Blood type: A positive   Antibody screen: negative  Hemoglobin: 13  Platelets: 535  Early 1 hr glucola: 136    Genetic screening: NIPT    OB history:   1  G1 - CS for nonreassuring HT  GDM  Was late to care - found out she was pregnant around 28 wks  GYN history: Last pap smear  neg/neg  No history of STDs  Oral HSV  Past medical history:   1  Obesity  2  AMA    Past surgical history:   1   section  2  cholecystectomy    Social history: Quit smoking  + T[employer] in  Urine - uses marijuana regularly  Reports likely undiagnosed mental illness - marijuana helps regulate mood  Family history: brother w/ muscular dystrophy    Physical exam:   Fetal heart tones: wnl  Pelvic exam: normal external genitalia  No abnormalities of vagina  Cervix visualized - appears closed with no concerning masses or abnormalities  A/P: 29 y o   at 13w2d, presents for initial prenatal visit, doing well  Problem List Items Addressed This Visit        Genitourinary    Maternal care due to low transverse uterine scar from previous  delivery  Most interested in RLTCS at this time  Needs counseling re: tolac vs RLTCS at upcoming appt  Other    Antepartum multigravida of advanced maternal age  NIPT pending  Continue aspirin for pre-eclampsia prevention    Obesity complicating pregnancy, first trimester  Recommend < 20# weight gain  Hereditary disease in family possibly affecting fetus  Brother w/ muscular dystrophy - pt interested in testing  Message sent to [facility]        Other Visit Diagnoses     Pregnancy, obstetrical care, second trimester    -  Primary  Routine visit in 4 wks or PRN    Screening for STDs (sexually transmitted diseases)        Relevant Orders    Chlamydia/GC amplified DNA by PCR    Marijuana use      Not well studied in pregnancy - recommend avoiding  Counseled pt we are able to provide mental health resources if necessary

## 2021-05-25 ENCOUNTER — TELEPHONE (OUTPATIENT)
Dept: OBGYN CLINIC | Facility: CLINIC | Age: 36
End: 2021-05-25

## 2021-05-25 ENCOUNTER — TELEPHONE (OUTPATIENT)
Dept: PERINATAL CARE | Facility: OTHER | Age: 36
End: 2021-05-25

## 2021-05-25 LAB
C TRACH DNA SPEC QL NAA+PROBE: NEGATIVE
N GONORRHOEA DNA SPEC QL NAA+PROBE: NEGATIVE

## 2021-05-25 NOTE — TELEPHONE ENCOUNTER
----- Message from Paz Adan MD sent at 5/25/2021  8:17 AM EDT -----  No mychart - please notify neg STD testing

## 2021-05-25 NOTE — TELEPHONE ENCOUNTER
Called patient to confirm Maternal Fetal Medicine virtual appt scheduled for 5/26/21  2:30  TRENT BARROSO  Confirmed with patient that she will receive a prompt, by her preference of text  Explained procedure for virtual visit and requested she be ready to log into appointment approximately 10 minutes prior to scheduled start time  Reminder the [facility] Provider will send her the link to join virtual appt near the scheduled appointment time  Also confirmed with pt current text info and current insurance information  Patient instructed to call [facility] office @ #[phone] for technical support issues or any questions regarding the procedure for virtual appt       PATIENT VERBALIZED UNDERSTANDING AND HAS NO QUESTIONS AT THIS TIME

## 2021-05-26 ENCOUNTER — TELEMEDICINE (OUTPATIENT)
Dept: PERINATAL CARE | Facility: CLINIC | Age: 36
End: 2021-05-26

## 2021-05-26 DIAGNOSIS — O09.529 ANTEPARTUM MULTIGRAVIDA OF ADVANCED MATERNAL AGE: Primary | ICD-10-CM

## 2021-05-26 DIAGNOSIS — O35.2XX0 HEREDITARY DISEASE IN FAMILY POSSIBLY AFFECTING FETUS, AFFECTING MANAGEMENT OF MOTHER IN PREGNANCY, SINGLE OR UNSPECIFIED FETUS: ICD-10-CM

## 2021-05-26 DIAGNOSIS — Z31.5 ENCOUNTER FOR PROCREATIVE GENETIC COUNSELING: ICD-10-CM

## 2021-05-26 PROCEDURE — NC001 PR NO CHARGE

## 2021-05-28 LAB — MISCELLANEOUS LAB TEST RESULT: NORMAL

## 2021-05-28 NOTE — PROGRESS NOTES
Genetic Counseling   High-Risk Gestation Note    Appointment Date:  2021  Referred By: Lakia Walters MD  YOB: 1985  Partner:  Radu Soriano  Indication for Visit:  advanced maternal age and personal and/or family history of genetic disorder:  patient half brother with galactomsemia; full brother with cerebal palsy  Pregnancy History:   Estimated Date of Delivery: 21  Estimated Gestational Age: 13w4d    Virtual Regular Visit      Assessment/Plan:    Problem List Items Addressed This Visit        Other    Antepartum multigravida of advanced maternal age - Primary    Hereditary disease in family possibly affecting fetus      Other Visit Diagnoses     Encounter for procreative genetic counseling                   Reason for visit is   Chief Complaint   Patient presents with    Virtual Regular Visit        Encounter provider Farooq Mccord    Provider located at [address] [place] [id number] [phone]      Recent Visits  Date Type Provider Dept   21 Telephone Zachary Fran, [address]   Showing recent visits within past 7 days and meeting all other requirements     Future Appointments  No visits were found meeting these conditions  Showing future appointments within next 150 days and meeting all other requirements        The patient was identified by name and date of birth  Zack Ferreira was informed that this is a telemedicine visit and that the visit is being conducted through [address] [place] [address] Now and patient was informed that this is a secure, HIPAA-compliant platform  She agrees to proceed     My office door was closed  No one else was in the room  She acknowledged consent and understanding of privacy and security of the video platform  The patient has agreed to participate and understands they can discontinue the visit at any time  Patient is aware this is a billable service  Bella Dixon is a 29 y o  female who presented with her partner for genetic counseling to discuss a family history of "muscular dystrophy"  A family history of galactosemia was also noted in her chart  Albert Murphy states that her full brother had "muscular dystrophy", however upon further conversation and discussion with her father it was confirmed that he was diagnosed with cerebral palsy  Her brother was premature (born at about 28?weeks), required a shunt due to "fluid on his brain" which developed after being given oxygen in the NICU, had mild intellectual disabilities and speech abnormalities, congenital vision impairment and progressive muscle deterioration  Genetic testing was not performed  We reviewed that cerebral palsy refers to a collective of neurological conditions that share in common disorders of motor function and posture  It occurs in approximately 1000 children and often occur with medical concerns such as intellectual disability, seizures and visual-motor dysfunction  We discussed that while cerebral palsy was once thought to be solely caused by problems in the prenatal/ period related to prematurity or the birth process, cerebral palsy may exhibit multifactorial inheritance  Possible prenatal or genetic etiologies include genetic disorders and syndromes, such as chromosomal abnormalities or inborn errors of metabolism, heritable thrombophilias, cerebral dysgenesis and teratogenic influences  The recurrence risk is dependent upon the underlying etiology and is likely to be low given the history  Albert Murphy has a paternal brother with Luayadia Popanabelle which was diagnosed on  screening  It is a disorder of carbohydrate metabolism that affects the bodys ability to convert galactose to glucose  The disorder is caused by mutations in the GALT gene which leads to a deficiency of an enzyme galactose-1-phosphate uridylyl transferase (GALT) which is vital to this process  Early diagnosis and treatment with a lactose-restricted diet is absolutely essential to avoid profound intellectual disability, liver failure and death in the  period  We reviewed the autosomal recessive inheritance pattern and that her father is an obligate carrier  Therefore there is a 50% chance that Albert Murphy is also a carrier  We discussed that if her and Ector Loco are both identified to be carriers there is a 25% chance for an affected pregnancy  Prenatal diagnosis via amniocentesis is available  Albert Murphy is interested in pursuing carrier screening for herself and will try to obtain genetic testing results for her brother or her father for targeted testing if possible  Histories for the patient and her partner's family were taken during our session  Further review of family history for the patient and her partner was noncontributory  The family history was not significant for other genetic diseases or disorders, intellectual disability, birth defects, fetal loss, or consanguinity  Patient reports being of  descent and that her  is of  descent  She denies either of them having known Ashkenazi Muslim ancestry  The benefits and limitations of Cystic fibrosis (CF), Spinal muscular atrophy (SMA), hemoglobinopathy, Fragile X, and expanded carrier screening was discussed  The patient elected to pursue expanded carrier screening pending insurance approval   She will be contacted for her blood draw once approval is obtained  We briefly reviewed additional prenatal testing and screening options  The risks, benefits, and limitations of amniocentesis were discussed with the patient  Amniocentesis is performed under direct real time ultrasound visualization to avoid both the fetus and the placenta  Once amniotic fluid is withdrawn, laboratory analysis is performed and amniotic fluid alpha-fetoprotein, as well as chromosome and/or microarray analysis is undertaken    The risk of genetic amniocentesis includes, but is not limited to less than 1 in 300 pregnancy loss rate or  delivery rate if 23 weeks or greater, infection, bleeding, rupture of membranes, failure of cells to grow, karyotype error, laboratory error, etc   Occasionally a repeat amniocentesis is necessary due to cell culture failure  Chromosome/microarray analysis from amniocentesis is 99 9% accurate and alpha-fetoprotein analysis can detect approximately 95% of open neural tube defects  We reviewed that level II anatomy ultrasound is typically performed at approximately 20 weeks gestation  Level II ultrasound evaluation is between 60-80% accurate in detecting major physical birth defects and variations in fetal development that may be associated with chromosome abnormalities  Level II ultrasound evaluation is not able to detect all birth defects or health problems  Antoni Barrios declined amniocentesis secondary to procedural related complications  She may reconsider if her [id number] results are abnormal   She went for her blood draw on 21 and results were pending at the time of our counseling session  She did elect to pursue MSAFP screening and Level II anatomy ultrasound at the appropriate times  Lastly, we discussed the fact that everyone in the general population regardless of age, family history, or medical background has approximately a 3-5% risk of having a child with some type of congenital anomaly, genetic disease or intellectual disability  Currently there are no tests available to rule out all birth defects or health problems  Antoni Barrios was provided with our contact information  I encouraged her to call with any questions or concerns  Plan/Tests Ordered:  1) Patient declined amniocentesis  2) Patient had Verlin Lamp drawn on 21 - results pending  3) Obtain and review patient's brother's galactosemia genetic testing results    4) Patient elected expanded carrier screening pending insurance approval   5) MSAFP screening at 15-20 weeks gestation - to be ordered by patient's OB office  6) Level II anatomy ultrasound at approximately 20 weeks gestation        HPI     Past Medical History:   Diagnosis Date    Anxiety     no tx or counseling    CTS (carpal tunnel syndrome)         Gestational diabetes     tx during pregnancy       Past Surgical History:   Procedure Laterality Date     SECTION      fetal heartrate was erratic    CHOLECYSTECTOMY LAPAROSCOPIC N/A 10/16/2018    Procedure: CHOLECYSTECTOMY LAPAROSCOPIC w/ IOC;  Surgeon: Laurinda Lundborg, MD;  Location: [facility];  Service: General       Current Outpatient Medications   Medication Sig Dispense Refill    aspirin 81 mg chewable tablet Chew 2 tablets (162 mg total) daily 60 tablet 6    cyclobenzaprine (FLEXERIL) 10 mg tablet Take 1 tablet (10 mg total) by mouth 3 (three) times a day as needed for muscle spasms for up to 7 days (Patient not taking: Reported on 2021) 30 tablet 0    Ferrous Sulfate (Iron) 28 MG TABS Take by mouth      lidocaine (LIDODERM) 5 % Apply 1 patch topically daily for 15 doses Remove & Discard patch within 12 hours or as directed by MD (Patient not taking: Reported on 2021) 15 patch 0    naproxen (NAPROSYN) 500 mg tablet Take 1 tablet (500 mg total) by mouth 2 (two) times a day with meals (Patient not taking: Reported on 2021) 30 tablet 0    omeprazole (PriLOSEC) 20 mg delayed release capsule Take 2 capsules by mouth daily for 14 days 28 capsule 0    ondansetron (ZOFRAN-ODT) 4 mg disintegrating tablet Take 1 tablet by mouth every 6 (six) hours as needed for nausea or vomiting for up to 3 days (Patient not taking: Reported on 2021) 12 tablet 0    pantoprazole (PROTONIX) 40 mg tablet Take 40 mg by mouth daily      Prenatal Vit-Fe Fumarate-FA (PRENATAL 1+1 PO) Take by mouth      pseudoephedrine (SUDAFED) 120 MG 12 hr tablet Take 1 tablet (120 mg total) by mouth 2 (two) times a day (Patient not taking: Reported on 4/22/2021) 15 tablet 0    sucralfate (CARAFATE) 1 g tablet Take 1 tablet by mouth 4 (four) times a day for 7 days (Patient not taking: Reported on 4/22/2021) 28 tablet 0     No current facility-administered medications for this visit  No Known Allergies    Review of Systems    Video Exam    There were no vitals filed for this visit  Physical Exam     I spent 70 minutes directly with the patient during this visit      VIRTUAL VISIT DISCLAIMER    Sunny Duke acknowledges that she has consented to an online visit or consultation  She understands that the online visit is based solely on information provided by her, and that, in the absence of a face-to-face physical evaluation by the physician, the diagnosis she receives is both limited and provisional in terms of accuracy and completeness  This is not intended to replace a full medical face-to-face evaluation by the physician  Sunny Duke understands and accepts these terms

## 2021-06-02 ENCOUNTER — TELEPHONE (OUTPATIENT)
Dept: PERINATAL CARE | Facility: CLINIC | Age: 36
End: 2021-06-02

## 2021-06-02 NOTE — TELEPHONE ENCOUNTER
----- Message from Marlen Jennings MD sent at 5/28/2021 11:02 PM EDT -----  Please notify this patient of her normal results      Marlen Jennings MD

## 2021-06-02 NOTE — TELEPHONE ENCOUNTER
I spoke to Saeid cook today and gave her the result of her MaterniT 21  She requested to know the gender  I verified her  and gave her the gender  I also reviewed with her the MSAFP and told her that this should be ordered by her OB at her next appointment  She denies any questions

## 2021-06-21 ENCOUNTER — ROUTINE PRENATAL (OUTPATIENT)
Dept: OBGYN CLINIC | Facility: CLINIC | Age: 36
End: 2021-06-21
Payer: COMMERCIAL

## 2021-06-21 VITALS — BODY MASS INDEX: 34.29 KG/M2 | SYSTOLIC BLOOD PRESSURE: 114 MMHG | WEIGHT: 175.6 LBS | DIASTOLIC BLOOD PRESSURE: 76 MMHG

## 2021-06-21 DIAGNOSIS — O34.211 MATERNAL CARE DUE TO LOW TRANSVERSE UTERINE SCAR FROM PREVIOUS CESAREAN DELIVERY: ICD-10-CM

## 2021-06-21 DIAGNOSIS — O09.529 ANTEPARTUM MULTIGRAVIDA OF ADVANCED MATERNAL AGE: ICD-10-CM

## 2021-06-21 DIAGNOSIS — Z34.82 ENCOUNTER FOR SUPERVISION OF OTHER NORMAL PREGNANCY IN SECOND TRIMESTER: Primary | ICD-10-CM

## 2021-06-21 LAB
SL AMB  POCT GLUCOSE, UA: NEGATIVE
SL AMB POCT URINE PROTEIN: POSITIVE

## 2021-06-21 PROCEDURE — 99213 OFFICE O/P EST LOW 20 MIN: CPT | Performed by: STUDENT IN AN ORGANIZED HEALTH CARE EDUCATION/TRAINING PROGRAM

## 2021-06-21 NOTE — PROGRESS NOTES
29 y o   at 17w2d, here for return OB visit  Feeling well overall and without concerns  Starting to feel FM  Denies LOF, VB, contractions  Denies dysuria, hematuria  No problems with activity  Feels confident that she will fail her 3hr gtt, so has already started dietary interventions        Problem List Items Addressed This Visit        Genitourinary    Maternal care due to low transverse uterine scar from previous  delivery       Other    Antepartum multigravida of advanced maternal age     -precautions reviewed  -prepregnancy BMI 33 with goal weight gain 11-20# discussed today: TWG = 5#  Dietary changes discussed today   -AFP ordered  -elevated 1hr, 3 hr ordered, discussed           Other Visit Diagnoses     Encounter for supervision of other normal pregnancy in second trimester    -  Primary    Relevant Orders    POCT urine dip    Glucose SARAH 3HR 100GM PREG PTS

## 2021-06-21 NOTE — ASSESSMENT & PLAN NOTE
-precautions reviewed  -prepregnancy BMI 33 with goal weight gain 11-20# discussed today: TWG = 5#  Dietary changes discussed today   -AFP ordered  -elevated 1hr, 3 hr ordered, discussed

## 2021-06-21 NOTE — PROGRESS NOTES
Pt is here for routine ob visit   No concerns at this time  Urine +1 protein/neg glucose   No VB,Cramping  +Flutter   pn1 labs

## 2021-07-09 ENCOUNTER — TELEPHONE (OUTPATIENT)
Dept: PERINATAL CARE | Facility: CLINIC | Age: 36
End: 2021-07-09

## 2021-07-09 NOTE — TELEPHONE ENCOUNTER
Received notice from patient's insurance that Inher[employer] 500PLUS was approved (Auth # [id number])  Called patient and confirmed date of birth  Let her know prior Efe Buitragole was obtained and she can get her blood drawn at any [facility] or [facility]'s location  Jose Luissanjay Maria will be mailed to her along with cost estimate information  Also discussed that this testing includes the GALT gene to screen for galactosemia, as her paternal brother's mutation information was not provided  She will be contacted when results are available, typically takes 21-28 days  Patient expressed verbal understanding and had no questions

## 2021-07-13 ENCOUNTER — ROUTINE PRENATAL (OUTPATIENT)
Dept: PERINATAL CARE | Facility: OTHER | Age: 36
End: 2021-07-13
Payer: COMMERCIAL

## 2021-07-13 VITALS
BODY MASS INDEX: 34.87 KG/M2 | DIASTOLIC BLOOD PRESSURE: 74 MMHG | HEART RATE: 87 BPM | SYSTOLIC BLOOD PRESSURE: 114 MMHG | WEIGHT: 177.6 LBS | HEIGHT: 60 IN

## 2021-07-13 DIAGNOSIS — Z36.86 ENCOUNTER FOR ANTENATAL SCREENING FOR CERVICAL LENGTH: ICD-10-CM

## 2021-07-13 DIAGNOSIS — Z3A.20 20 WEEKS GESTATION OF PREGNANCY: Primary | ICD-10-CM

## 2021-07-13 DIAGNOSIS — Z36.89 ENCOUNTER FOR FETAL ANATOMIC SURVEY: ICD-10-CM

## 2021-07-13 DIAGNOSIS — O99.810 ABNORMAL GLUCOSE TOLERANCE AFFECTING PREGNANCY, ANTEPARTUM: ICD-10-CM

## 2021-07-13 DIAGNOSIS — O09.529 ANTEPARTUM MULTIGRAVIDA OF ADVANCED MATERNAL AGE: ICD-10-CM

## 2021-07-13 PROCEDURE — 76817 TRANSVAGINAL US OBSTETRIC: CPT | Performed by: OBSTETRICS & GYNECOLOGY

## 2021-07-13 PROCEDURE — 76811 OB US DETAILED SNGL FETUS: CPT | Performed by: OBSTETRICS & GYNECOLOGY

## 2021-07-13 PROCEDURE — 99214 OFFICE O/P EST MOD 30 MIN: CPT | Performed by: OBSTETRICS & GYNECOLOGY

## 2021-07-13 NOTE — LETTER
Denies known Latex allergy or symptoms of Latex sensitivity.  Medications reviewed and updated.  Here for initial evaluation.  Headache Clinic:    Child accompanied by:  father  Immunizations:  Up to Date  Headache Type/Classification:Onset of headaches about 2-3 years ago  bi-temporal pain, states it \"hurts really really bad\".  He notes he vomits with most headaches.  He notes he sometimes gets \"a little wobbly\" He feels he needs to sit down or rest when he has a headache.   No visual disturbances with headache.   Frequency:  no pattern to headaches, dad notes he can have one per week, sometimes two a week.  Sometimes can go one week without headache.   Duration:  start in mid day or in the evening.   Can have headache 2 days in a row.  Mom uses Ibuprofen which helps relieve headache  Aura:  no  New Headache Type?:  no  Date of last headache:  a couple of days ago  Drug change since last visit?:  no, please see patient's current medication list.  RN Comments:    He does not miss school with headache, sometimes comes home with headache. He wants to sleep when he has a headache, headache interferes with his activities.  No hx. of hospitalizations, no surgeries.    Paternal GM has hx. of migraines.      No known triggers for his headaches.    He will be in 4th grade at [employer] in [place].   Hx. of ADHD, parents feel his medication is helping.  Mom notes he also has hx. of ODD.     Lives with mom and dad, brother Rhett, age 8, a sister age 1 year, and a brother age 2 years.    Current Outpatient Prescriptions   Medication Sig Dispense Refill   • dexmethylphenidate (FOCALIN XR) 10 MG 24 hr capsule Take 1 capsule by mouth daily. 30 capsule 0   • Cholecalciferol (VITAMIN D) 2000 units capsule Take 1 capsule by mouth daily. 30 capsule 6   • Pediatric Multiple Vit-C-FA (MULTIVITAMIN CHILDRENS) Chew Tab Chew 1 tablet by mouth daily. 30 tablet 12     No current facility-administered medications for this visit.   July 13, 2021     Juan HolmanJoseph [address] [facility] [address]    Patient: David Faulkner   YOB: 1985   Date of Visit: 7/13/2021       Dear Vanita Metcalf: Thank you for referring David Faulkner to me for evaluation  Below are my notes for this consultation  If you have questions, please do not hesitate to call me  I look forward to following your patient along with you  Sincerely,        Evelyn Garza MD        CC: No Recipients  Evelyn Garza MD  7/13/2021  8:47 AM  Sign when Signing Visit  [address]: Ms Vesna Coffey was seen today at 20w3d for anatomic survey and cervical length screening ultrasound  See ultrasound report under "OB Procedures" tab    Please don't hesitate to contact our office with any concerns or questions   -Evelyn Garza MD

## 2021-07-13 NOTE — PROGRESS NOTES
[address]: Ms Cedrick Russo was seen today at 20w3d for anatomic survey and cervical length screening ultrasound  See ultrasound report under "OB Procedures" tab    Please don't hesitate to contact our office with any concerns or questions   -Maikol Briseno MD

## 2021-07-14 ENCOUNTER — TELEMEDICINE (OUTPATIENT)
Dept: PERINATAL CARE | Facility: CLINIC | Age: 36
End: 2021-07-14
Payer: COMMERCIAL

## 2021-07-14 ENCOUNTER — TELEPHONE (OUTPATIENT)
Dept: PERINATAL CARE | Facility: CLINIC | Age: 36
End: 2021-07-14

## 2021-07-14 ENCOUNTER — DOCUMENTATION (OUTPATIENT)
Dept: PERINATAL CARE | Facility: CLINIC | Age: 36
End: 2021-07-14

## 2021-07-14 DIAGNOSIS — Z3A.20 20 WEEKS GESTATION OF PREGNANCY: ICD-10-CM

## 2021-07-14 DIAGNOSIS — O99.810 ABNORMAL MATERNAL GLUCOSE TOLERANCE, COMPLICATING PREGNANCY: Primary | ICD-10-CM

## 2021-07-14 DIAGNOSIS — O99.810 ABNORMAL GLUCOSE TOLERANCE AFFECTING PREGNANCY, ANTEPARTUM: Primary | ICD-10-CM

## 2021-07-14 DIAGNOSIS — Z13.1 SCREENING FOR DIABETES MELLITUS: ICD-10-CM

## 2021-07-14 DIAGNOSIS — O99.212 OBESITY COMPLICATING PREGNANCY, SECOND TRIMESTER: ICD-10-CM

## 2021-07-14 PROCEDURE — G0108 DIAB MANAGE TRN  PER INDIV: HCPCS | Performed by: DIETITIAN, REGISTERED

## 2021-07-14 NOTE — TELEPHONE ENCOUNTER
Spoke with patient and confirmed her MFM appointment had to be rescheduled, per staff danuta:    Pierre Srinivasan MA; P  [facility],   2 PM Class 1 for me lasts at least 1 hour till 3 PM or more  My last patient of the day is scheduled to begin at 2:45 PM while the previous class is still happening  Please call the last patient, Betzaida Triplett  I can see her at 3:30 PM  If she cannot do that, it will have to be changed to another day          Patient verbalized understanding of new time, date and location of appointment - 21  3:30  VIRTUAL APPT  Patient denies further questions

## 2021-07-14 NOTE — PROGRESS NOTES
Virtual Regular Visit    Verification of patient location:    Patient is currently located in the state of [place]  Patient is currently located in a state in which I am licensed    Assessment/Plan:    Problem List Items Addressed This Visit        Other    20 weeks gestation of pregnancy    Abnormal glucose tolerance affecting pregnancy, antepartum               Reason for visit is   Chief Complaint   Patient presents with    Virtual Regular Visit        Encounter provider Jose Carlos Grubbs    Provider located at [address] [place] [id number] [phone]      Recent Visits  Date Type Provider Dept   21 Telephone Dianna Morton   Showing recent visits within past 7 days and meeting all other requirements  Future Appointments  No visits were found meeting these conditions  Showing future appointments within next 150 days and meeting all other requirements       The patient was identified by name and date of birth  Smooth Vallejo was informed that this is a telemedicine visit and that the visit is being conducted through [address] [place] [address] Road Now and patient was informed that this is a secure, HIPAA-compliant platform  She agrees to proceed     My office door was closed  No one else was in the room  She acknowledged consent and understanding of privacy and security of the video platform  The patient has agreed to participate and understands they can discontinue the visit at any time  Patient is aware this is a billable service  Subjective  Smooth Vallejo is a 29 y o  female pregnant patient        HPI     Past Medical History:   Diagnosis Date    Anxiety     no tx or counseling    CTS (carpal tunnel syndrome)     2019    Gestational diabetes 2014    tx during pregnancy       Past Surgical History:   Procedure Laterality Date     SECTION      fetal heartrate was erratic    CHOLECYSTECTOMY LAPAROSCOPIC N/A 10/16/2018 Procedure: CHOLECYSTECTOMY LAPAROSCOPIC w/ IOC;  Surgeon: Lizz Graff MD;  Location: [facility];  Service: General       Current Outpatient Medications   Medication Sig Dispense Refill    aspirin 81 mg chewable tablet Chew 2 tablets (162 mg total) daily 60 tablet 6    Ferrous Sulfate (Iron) 28 MG TABS Take by mouth (Patient not taking: Reported on 2021)      omeprazole (PriLOSEC) 20 mg delayed release capsule Take 2 capsules by mouth daily for 14 days 28 capsule 0    ondansetron (ZOFRAN-ODT) 4 mg disintegrating tablet Take 1 tablet by mouth every 6 (six) hours as needed for nausea or vomiting for up to 3 days (Patient not taking: Reported on 2021) 12 tablet 0    pantoprazole (PROTONIX) 40 mg tablet Take 40 mg by mouth daily (Patient not taking: Reported on 2021)      Prenatal Vit-Fe Fumarate-FA (PRENATAL 1+1 PO) Take by mouth       No current facility-administered medications for this visit  No Known Allergies    Review of Systems    Video Exam    There were no vitals filed for this visit  Physical Exam --not performed     Time spent with the patient: 30 minutes  VIRTUAL VISIT DISCLAIMER      Ahmet Wesley verbally agrees to participate in [employer]  Pt is aware that [employer] could be limited without vital signs or the ability to perform a full hands-on physical Dickson Po understands she or the provider may request at any time to terminate the video visit and request the patient to seek care or treatment in person  DATE: 21   RE: Ahmet Wesley   : 1985  SKYLER: Estimated Date of Delivery: 21  EGA:  [id number]    Dear Drs  At [facility]:     Thank you for referring your patient to the [facility] at [address] [place] [address] Drive  The patient's pregnancy is complicated with a history of gestational diabetes in 2014 & obesity    The patient received the following education on blood glucose monitoring during pregnancy via virtual telemedicine:       Self-monitoring of blood glucose levels: fasting (goal 60mg/dl to 90mg/dl) and two hours after the start of the meal less (goal less than 120mg/dl)  The patient was provided with a One-Touch Verio blood glucose meter and supplies   Report blood glucose levels to [address] on Thursday, 7/22/21  o Phone: [phone]  If no response in 24 hours, call [phone]   Follow up: As needd    Diabetes Self Management Support Plan outside of ongoing care: Spouse/Family     Patient Stated Goal: "I will check my blood sugar 4 times each day, as directed by diabetes and pregnancy team"    Thank you for the opportunity to participate in the care of this patient  I can be reached at [phone] should you have any questions  Time spent with patient 3:30-4:10 PM; time spent face to face counseling greater than 50% of the appointment      Sincerely,     Everton Bradford  Diabetes Educator  Diabetes and Pregnancy Program

## 2021-07-14 NOTE — PROGRESS NOTES
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 yes Gestational diabetes 7 years ago-2014  Family members with diabetes: Father and Paternal grandmother & aunt & on the meaternal side of the family    Pregnancy History:  How many total pregnancies have you had? 2  How many children do you have? 1  Are you having swelling or fluid retention? no  Have you been placed on bedrest? No    Physical Activity:  Do you exercise? No, but never sits down    Nutrition Questionnaire: How many meals do you eat daily? 3  List times of meals: Other Times vary depending on time she starts her day Has 3-5 hours between meals  h  How many snacks do you eat daily? 3  List times of snacks: Other Between meals & bedtime  What type of diet are you following at home? Low Carbohydrate started the GDM diet as soon as became pregnant  Stated she does not feel well if does not follow this diet    Always eats the recommended portions on the food labels & also has her family limit portions likewise  Uses measuring cups to measure foods     Do you have special or ethnic dietary preferences? no  Do you have any food allergies or intolerances? yes Tomato sauce & acidy foods    Learning preferences: How do you learn best? Visual & hands on  How do you rate your health? Good  Who is your primary support person? Significant Other  How do you cope with stress?  Adult coloring books & video games  Do you have any cultural or Buddhism beliefs we should be aware of? yes Wiccan beliefs  Do you receive WIC or food stamps? no

## 2021-07-15 RX ORDER — LANCETS 33 GAUGE
EACH MISCELLANEOUS
Qty: 100 EACH | Refills: 0 | Status: SHIPPED | OUTPATIENT
Start: 2021-07-15 | End: 2021-07-30 | Stop reason: SDUPTHER

## 2021-07-15 RX ORDER — BLOOD-GLUCOSE METER
EACH MISCELLANEOUS
Qty: 1 KIT | Refills: 0 | Status: SHIPPED | OUTPATIENT
Start: 2021-07-15 | End: 2021-12-10

## 2021-07-15 RX ORDER — BLOOD SUGAR DIAGNOSTIC
STRIP MISCELLANEOUS
Qty: 50 STRIP | Refills: 0 | Status: SHIPPED | OUTPATIENT
Start: 2021-07-15 | End: 2021-07-30 | Stop reason: SDUPTHER

## 2021-07-16 ENCOUNTER — TELEPHONE (OUTPATIENT)
Dept: OBGYN CLINIC | Facility: CLINIC | Age: 36
End: 2021-07-16

## 2021-07-16 NOTE — TELEPHONE ENCOUNTER
Pt is 20 week can still be done till 22 weeks, pt contacted # [phone] vm- per hippa communication consent on file- lmom advising pt over due lab can still have it done with call back number should she have any questions

## 2021-07-19 ENCOUNTER — APPOINTMENT (OUTPATIENT)
Dept: LAB | Facility: HOSPITAL | Age: 36
End: 2021-07-19
Attending: STUDENT IN AN ORGANIZED HEALTH CARE EDUCATION/TRAINING PROGRAM
Payer: COMMERCIAL

## 2021-07-19 DIAGNOSIS — Z34.82 ENCOUNTER FOR SUPERVISION OF OTHER NORMAL PREGNANCY IN SECOND TRIMESTER: ICD-10-CM

## 2021-07-19 DIAGNOSIS — O35.2XX0 HEREDITARY FAMILIAL DISEASE AFFECTING MANAGEMENT OF MOTHER AND POSSIBLY AFFECTING FETUS, ANTEPARTUM, SINGLE OR UNSPECIFIED FETUS: Primary | ICD-10-CM

## 2021-07-19 DIAGNOSIS — Z13.71 TESTING OF FEMALE FOR GENETIC DISEASE CARRIER STATUS: ICD-10-CM

## 2021-07-19 PROCEDURE — 36415 COLL VENOUS BLD VENIPUNCTURE: CPT

## 2021-07-19 PROCEDURE — 82105 ALPHA-FETOPROTEIN SERUM: CPT

## 2021-07-19 PROCEDURE — 81412 ASHKENAZI JEWISH ASSOC DIS: CPT

## 2021-07-20 ENCOUNTER — ROUTINE PRENATAL (OUTPATIENT)
Dept: OBGYN CLINIC | Facility: CLINIC | Age: 36
End: 2021-07-20
Payer: COMMERCIAL

## 2021-07-20 VITALS
SYSTOLIC BLOOD PRESSURE: 115 MMHG | DIASTOLIC BLOOD PRESSURE: 70 MMHG | WEIGHT: 175.8 LBS | HEIGHT: 60 IN | BODY MASS INDEX: 34.51 KG/M2

## 2021-07-20 DIAGNOSIS — Z34.82 ENCOUNTER FOR SUPERVISION OF NORMAL PREGNANCY IN MULTIGRAVIDA IN SECOND TRIMESTER: Primary | ICD-10-CM

## 2021-07-20 PROBLEM — Z3A.20 20 WEEKS GESTATION OF PREGNANCY: Status: RESOLVED | Noted: 2021-07-13 | Resolved: 2021-07-20

## 2021-07-20 LAB
SL AMB  POCT GLUCOSE, UA: NEGATIVE
SL AMB POCT URINE PROTEIN: NEGATIVE

## 2021-07-20 PROCEDURE — 99213 OFFICE O/P EST LOW 20 MIN: CPT | Performed by: OBSTETRICS & GYNECOLOGY

## 2021-07-20 NOTE — PROGRESS NOTES
Encounter for supervision of normal pregnancy in multigravida in second trimester  Zohaib Waterman is a 29 y o   21w3d  Feels well  Denies LOF/CTX/VB  Feel fetal movement   1 hour glucose elevated, she is monitoring her blood sugars for this week and reporting to MFM, she will go for 3hr if needed but was told by MFM to hold off until they see what her readings are over the week  Has hx of GDM so may just be treated as such  AFP & inheritest completed, no results available  Printed info provided for GDM nutrition    Pt has a cracked tooth lower left, she was having pain last night  Advised tylenol, ice packs, warm salt water rinses after each meal and lysterine mouth wash as well as dental follow up     Pregnancy Essential guide and [facility] and Me center web site recommended

## 2021-07-20 NOTE — ASSESSMENT & PLAN NOTE
Army Brothers is a 29 y o   21w3d  Feels well  Denies LOF/CTX/VB  Feel fetal movement   1 hour glucose elevated, she is monitoring her blood sugars for this week and reporting to MFM, she will go for 3hr if needed but was told by MFM to hold off until they see what her readings are over the week  Has hx of GDM so may just be treated as such  AFP & inheritest completed, no results available  Printed info provided for GDM nutrition    Pt has a cracked tooth lower left, she was having pain last night  Advised tylenol, ice packs, warm salt water rinses after each meal and lysterine mouth wash as well as dental follow up     Pregnancy Essential guide and Baby and Me center web site recommended

## 2021-07-20 NOTE — PROGRESS NOTES
Patient is 21 weeks 3 days at today's visit she reports that she feels very mild movements she states it feels like bubbles   Patient reports that she is doing good so far  In her pregnancy

## 2021-07-21 LAB
2ND TRIMESTER 4 SCREEN SERPL-IMP: NORMAL
AFP ADJ MOM SERPL: 1.22
AFP INTERP AMN-IMP: NORMAL
AFP INTERP SERPL-IMP: NORMAL
AFP INTERP SERPL-IMP: NORMAL
AFP SERPL-MCNC: 76.2 NG/ML
AGE AT DELIVERY: 35.6 YR
GA METHOD: NORMAL
GA: 21.3 WEEKS
IDDM PATIENT QL: NO
MULTIPLE PREGNANCY: NO
NEURAL TUBE DEFECT RISK FETUS: 6110 %

## 2021-07-30 DIAGNOSIS — O99.810 ABNORMAL MATERNAL GLUCOSE TOLERANCE, COMPLICATING PREGNANCY: ICD-10-CM

## 2021-07-30 LAB
CLINICAL INFO: NORMAL
ETHNIC BACKGROUND STATED: NORMAL
GENE MUT TESTED BLD/T: NORMAL
GENERAL COMMENTS:: NORMAL
LAB DIRECTOR NAME PROVIDER: NORMAL
LABORATORY COMMENT REPORT: NORMAL
Lab: NORMAL
Lab: NORMAL
REASON FOR REFERRAL (NARRATIVE): NORMAL
REF LAB TEST METHOD: NORMAL
SPECIMEN SOURCE: NORMAL

## 2021-07-30 RX ORDER — LANCETS 33 GAUGE
EACH MISCELLANEOUS
Qty: 100 EACH | Refills: 1 | Status: SHIPPED | OUTPATIENT
Start: 2021-07-30 | End: 2021-08-10 | Stop reason: SDUPTHER

## 2021-07-30 RX ORDER — BLOOD SUGAR DIAGNOSTIC
STRIP MISCELLANEOUS
Qty: 100 STRIP | Refills: 1 | Status: SHIPPED | OUTPATIENT
Start: 2021-07-30 | End: 2021-08-10 | Stop reason: SDUPTHER

## 2021-07-30 NOTE — TELEPHONE ENCOUNTER
Patient called our office for a refill on test strips because she feels like she has GDM  Explained to the patient we need her blood sugars in order for her OBGYN to confirm GDM and send referral to our office  Patient had her meter teaching on 07/14/2021 with Lynne Davidson but never reported to our office  Patient stated she is eating out a lot and noticed after eating one pancake her blood sugar was 221  Claims her FBS range 93-99 from what she can remember, yesterday was 103  Patient left her meter at home and when she gets back home she will call our voicemail line to report  Advised patient our office closes at 4:15 pm and if we do not get back to her today we will address on Monday  Patient verbalized understanding and agreed to call her blood sugars later today  Patient has history of GDM in 2014  Will need class 1&2 with insulin pen education once we have confirmed diagnosis and referral is placed  Patient is aware    07/13/2021 per Dr Betzaida Soto: We discussed her elevated glucola, and need to determine whether she  has gestational diabetes  I offered the option for 1 week of accu-checks  in lieu of a 3 hour glucose tolerance test, which she accepted  I referred  her glucometer teaching to our diabetic educators

## 2021-08-02 ENCOUNTER — PATIENT MESSAGE (OUTPATIENT)
Dept: PERINATAL CARE | Facility: CLINIC | Age: 36
End: 2021-08-02

## 2021-08-02 ENCOUNTER — DOCUMENTATION (OUTPATIENT)
Dept: PERINATAL CARE | Facility: CLINIC | Age: 36
End: 2021-08-02

## 2021-08-02 DIAGNOSIS — O99.810 ABNORMAL GLUCOSE TOLERANCE AFFECTING PREGNANCY, ANTEPARTUM: Primary | ICD-10-CM

## 2021-08-02 PROBLEM — O24.419 GESTATIONAL DIABETES MELLITUS (GDM): Status: ACTIVE | Noted: 2021-07-13

## 2021-08-02 NOTE — PROGRESS NOTES
Date: 08/02/21  David Faulkner  1985  Estimated Date of Delivery: 11/27/21  23w2d  OB/GYN: Frank Herrera  Diagnosis:   History of GDM 2014,     Date Fasting Post-  breakfast Post-  lunch Post-  dinner Comments   07/18/21 103 109 95 129    07/19/21 84 221 102 106 Pancakes for breakfast   07/20/21 89 116 114 104    07/21/21 99 124 115 138    07/22/21 99 97 99 100    07/23/21 90 - 141 123    07/24/21 87 123 111 108    07/25/21 84 134 - 125    07/26/21 87 116 198 150    07/27/21 100 91 147 152    07/28/21 92 72 112 104      Needs final determination to diagnose with GDM  Once confirmed by provider, a referral will need to be placed to schedule for combination class 1 and 2, with insulin pend education  07/13/2021: Dr Audley Pallas -    We discussed her elevated glucola, and need to determine whether she  has gestational diabetes  I offered the option for 1 week of accu-checks  in lieu of a 3 hour glucose tolerance test, which she accepted  I referred  her glucometer teaching to our diabetic educators  Once diagnosis is confirmed:  Diet: 1800 Gestational diabetes meal plan; 3 meals and 3 snacks  SMBG: Checks blood sugar 4 times per day; fasting and 2 hour start of each meal    Meter: One Touch verio glucose meter  Activity: Walks 30 minutes daily, follow OB recommendations     Support System: Significant Other  Patient Goal: To be determined at class 1 & 2     Labs  Labs ordered    Ultrasounds  07/13/2021  Normal growth and SVETLANA   Next US scheduled for 10/05/2021    Further fetal surveillance  Beginning at 32 weeks, NST / SVETLANA twice a week, if medications added to regimen    Ricardo Dunn RN

## 2021-08-05 ENCOUNTER — APPOINTMENT (OUTPATIENT)
Dept: LAB | Facility: HOSPITAL | Age: 36
End: 2021-08-05
Payer: COMMERCIAL

## 2021-08-05 DIAGNOSIS — O99.810 ABNORMAL GLUCOSE TOLERANCE AFFECTING PREGNANCY, ANTEPARTUM: Primary | ICD-10-CM

## 2021-08-05 LAB
ALBUMIN SERPL BCP-MCNC: 2.7 G/DL (ref 3.5–5)
ALP SERPL-CCNC: 90 U/L (ref 46–116)
ALT SERPL W P-5'-P-CCNC: 17 U/L (ref 12–78)
ANION GAP SERPL CALCULATED.3IONS-SCNC: 12 MMOL/L (ref 4–13)
AST SERPL W P-5'-P-CCNC: 11 U/L (ref 5–45)
BILIRUB SERPL-MCNC: 0.32 MG/DL (ref 0.2–1)
BUN SERPL-MCNC: 8 MG/DL (ref 5–25)
CALCIUM ALBUM COR SERPL-MCNC: 9.7 MG/DL (ref 8.3–10.1)
CALCIUM SERPL-MCNC: 8.7 MG/DL (ref 8.3–10.1)
CHLORIDE SERPL-SCNC: 102 MMOL/L (ref 100–108)
CO2 SERPL-SCNC: 22 MMOL/L (ref 21–32)
CREAT SERPL-MCNC: 0.63 MG/DL (ref 0.6–1.3)
EST. AVERAGE GLUCOSE BLD GHB EST-MCNC: 108 MG/DL
GFR SERPL CREATININE-BSD FRML MDRD: 117 ML/MIN/1.73SQ M
GLUCOSE P FAST SERPL-MCNC: 106 MG/DL (ref 65–99)
HBA1C MFR BLD: 5.4 %
POTASSIUM SERPL-SCNC: 3.8 MMOL/L (ref 3.5–5.3)
PROT SERPL-MCNC: 7.4 G/DL (ref 6.4–8.2)
SODIUM SERPL-SCNC: 136 MMOL/L (ref 136–145)

## 2021-08-05 PROCEDURE — 83036 HEMOGLOBIN GLYCOSYLATED A1C: CPT

## 2021-08-05 PROCEDURE — 80053 COMPREHEN METABOLIC PANEL: CPT

## 2021-08-05 PROCEDURE — 36415 COLL VENOUS BLD VENIPUNCTURE: CPT

## 2021-08-10 ENCOUNTER — OFFICE VISIT (OUTPATIENT)
Dept: PERINATAL CARE | Facility: CLINIC | Age: 36
End: 2021-08-10
Payer: COMMERCIAL

## 2021-08-10 VITALS
BODY MASS INDEX: 34.59 KG/M2 | HEIGHT: 60 IN | DIASTOLIC BLOOD PRESSURE: 86 MMHG | WEIGHT: 176.2 LBS | HEART RATE: 100 BPM | SYSTOLIC BLOOD PRESSURE: 125 MMHG

## 2021-08-10 DIAGNOSIS — Z3A.24 24 WEEKS GESTATION OF PREGNANCY: ICD-10-CM

## 2021-08-10 DIAGNOSIS — O09.292 HISTORY OF GESTATIONAL DIABETES IN PRIOR PREGNANCY, CURRENTLY PREGNANT IN SECOND TRIMESTER: ICD-10-CM

## 2021-08-10 DIAGNOSIS — O99.810 ABNORMAL MATERNAL GLUCOSE TOLERANCE, COMPLICATING PREGNANCY: ICD-10-CM

## 2021-08-10 DIAGNOSIS — O24.419 GESTATIONAL DIABETES MELLITUS (GDM) IN THIRD TRIMESTER, GESTATIONAL DIABETES METHOD OF CONTROL UNSPECIFIED: Primary | ICD-10-CM

## 2021-08-10 DIAGNOSIS — O99.212 OBESITY AFFECTING PREGNANCY IN SECOND TRIMESTER: ICD-10-CM

## 2021-08-10 DIAGNOSIS — Z86.32 HISTORY OF GESTATIONAL DIABETES IN PRIOR PREGNANCY, CURRENTLY PREGNANT, SECOND TRIMESTER: ICD-10-CM

## 2021-08-10 DIAGNOSIS — O24.419 GESTATIONAL DIABETES MELLITUS (GDM) IN SECOND TRIMESTER, GESTATIONAL DIABETES METHOD OF CONTROL UNSPECIFIED: Primary | ICD-10-CM

## 2021-08-10 DIAGNOSIS — O09.292 HISTORY OF GESTATIONAL DIABETES IN PRIOR PREGNANCY, CURRENTLY PREGNANT, SECOND TRIMESTER: ICD-10-CM

## 2021-08-10 DIAGNOSIS — Z86.32 HISTORY OF GESTATIONAL DIABETES IN PRIOR PREGNANCY, CURRENTLY PREGNANT IN SECOND TRIMESTER: ICD-10-CM

## 2021-08-10 PROCEDURE — G0108 DIAB MANAGE TRN  PER INDIV: HCPCS

## 2021-08-10 RX ORDER — LANCETS 33 GAUGE
EACH MISCELLANEOUS
Qty: 100 EACH | Refills: 4 | Status: SHIPPED | OUTPATIENT
Start: 2021-08-10 | End: 2021-08-12 | Stop reason: SDUPTHER

## 2021-08-10 RX ORDER — BLOOD SUGAR DIAGNOSTIC
STRIP MISCELLANEOUS
Qty: 100 STRIP | Refills: 4 | Status: SHIPPED | OUTPATIENT
Start: 2021-08-10 | End: 2021-08-22 | Stop reason: SDUPTHER

## 2021-08-10 NOTE — PROGRESS NOTES
Thank you for referring your patient to [facility] Maternal Fetal Medicine Diabetes in Pregnancy Program      David Faulkner is a  29 y o  female who presents today for Combo Class 1 and 2 and Insulin Teaching  Patient is at 24w3d gestation, Estimated Date of Delivery: 21  Reviewed and updated the following from patients medical record: PMH, Problem List, Allergies, and Current Medications  Visit Diagnosis:  GDM in pregnancy method of control unspecified    Discussed with patient pathophysiology of GDM, untreated hyperglycemia in pregnancy and maternal fetal complications including fetal macrosomia,  hypoglycemia, polyhydramnios, increased incidence of  section,  labor, and in severe cases fetal demise and still birth   Discussed importance of blood glucose monitoring, nutrition, and medication if necessary in achieving BG goals  Additional Pregnancy Complications:  Obesity and history of GDM in prior  pregnancy  Patient reports she was a patient of [facility]  Patient did not remember medication used with last pregnancy  Labs:    Lab Results   Component Value Date    [id number] 136 (H) 2021       Patient did not complete 3 hr GTT and opted to complete SMBG for 1 week to rule out development of GDM     Noted 21 A1c-5 4% and noted completed 21 CMP     Medications:  No diabetes related medications    Anthropometrics:  Ht Readings from Last 3 Encounters:   08/10/21 5' (1 524 m)   21 5' (1 524 m)   21 5' (1 524 m)     Wt Readings from Last 3 Encounters:   08/10/21 79 9 kg (176 lb 3 2 oz)   21 79 7 kg (175 lb 12 8 oz)   21 80 6 kg (177 lb 9 6 oz)     Pre-gravid weight: 77 1 kg (170 lb)  Pre-gravid BMI: 33 20  Weight Change: 2 812 kg (6 lb 3 2 oz)  Weight gain recommendations: BMI (> 30) 11-20 lbs    Recent Ultra Sound Results:  Date: 21  Fetal Growth: Normal  SVETLANA: Normal    Blood Glucose Monitoring:   Glucose Meter: OneTouch Verio Flex  Instructed on testing blood sugars: 4 x per day (Fasting, 1 hour after start of each meal)       Date Fasting Post-  breakfast Pre-  lunch Post-  lunch Pre-  dinner Post-  dinner Before bedtime Comments    93  132       8/3 104  120       8/4 108 103 133 136       8/5 NR NR NR NR        99 144 134 NR        88 78 145 132       /8 100 121 107  2 hr pp 142       8/9 105          8/10 118       Not fasting had coffee with sugary creamer     Patient reported blood sugars at today's class 1&2 combination appointment  Discussed blood glucose report with Dr Adriel Hernandez today  Patient reported FBG measurement follows <8 hr fast  Advised FBG measurement should follow no less than 8 hr fast and no longer than 10 hr fast overnight  Diet guidelines reviewed in detail  Bedtime snack changed to 1 serving CHO and 2-3 ounces of protein  Insulin education was completed today  Dr Adriel Hernandez advised patient report next week for decision to start insulin therapy  Patient would prefer to avoid Metformin since it crosses placenta to the baby  Patient is agreeable to start insulin if needed on next report  Patient scheduled an ultrasound based on protocol guidelines  Diabetes Self Management Support Plan outside of ongoing care: Spouse/Family    [facility] Maternal Fetal Medicine  Diabetes and Pregnancy Program    Gave instruction on site selection, skin preparation, loading strips and lancet device, meter activation, obtaining blood sample, test strip and lancet disposal and storage, and recording log book entries  Patient has good understanding of material covered and was able to test their own blood sugar in office today       Instruction for reporting blood sugar results weekly via:  Phone: [phone]   OR  My Chart (Message with image attachment, or Glucose Flowsheet)    Goal Blood Sugar Ranges:   Fastin-90 mg/dL  1 hour after the start of each meal: 140 mg/dL or <  2 hours after start of each meal: 120 mg/dL or <    Meal Plan (daily calorie and protein needs):  Calories: 1800 calorie (CHO: [id number]) (PRO:2-1-3-1-3-2)    Type of Diet:Over the last week patient has discontinue Mountain Dew sugar sweetened cereal  Trying to avoid desserts and eat healthier  Additional Nutrition Concerns: Patient will typically skip breakfast  Agreeable to try supplements like Glucerna with a handful of nuts and a piece of fruit  Patient does drink coffee with a sugar sweetened creamer in the morning  She would prefer to avoid sugar substitutes if possible  Meal Plan Tips:  1  Patient was provided with a meal plan including 3 meals and 3 snacks  2  Discussed appropriate amounts of CHO, PRO, and Fat at each meal and snack  3  Reviewed CHO exchange list, and portion sizes for both CHO and PRO via food models  4  Instruction on how to read a food label  5  Provided suggested meal/snack options to increase nutrition and maintain consistent meal and snack intakes  6  Instructed on how to keep a 3-day food diary to be brought to follow- up appointment  7  Encouraged  patient to eat every 2 0-3 5 hours while awake  8  Encouraged patient to go no longer than 8-10 hours fasting overnight until first meal of the day  Physical Activity:  Discussed benefits of physical activity to optimize blood glucose control, encouraged activity at patient is physically able  Always consult a physician prior to starting an exercise program  Recommend 20-30 minutes daily  Is patient physically active? patient reports being active with her family daily  Sick day Guidelines:   Patient advised that sickness will raise blood sugar and need to continue medication regimen as directed  If blood sugar is > 160 mg/dL twice in one day call doctor  Instructed on what to do when unable to consume normal meal plan       Hypoglycemia & Treatment Guidelines:  Reviewed what hypoglycemia is, signs and symptoms, and how to treat  Post-Partum Guidelines:  Completion of 75 gm CHO 2 hr gtt at 6 weeks post-partum to check for Type 2 DM diagnosis    Breastfeeding Guidelines:  Continue GDM meal plan plus additional 350-500 calories daily  Stay hydrated by drinking 8-10 (8 oz ) fluids daily  Examples of protein and carbohydrate snacks provided  Dining Out & Travel Guidelines:  Patient advised to be prepared with extra diabetes supplies, medications, and snacks, as well as sticking to the same time schedule and portions eaten at home for meals and snacks  Maternal-Fetal Testing:    Ultrasounds- growth scans every 4 weeks  NST- twice weekly starting at 32nd week GA   SVETLANA- weekly starting at 32 weeks GA       Patient Stated Goal: "I will eat 3 meals and 3 snacks each day, including protein at each"    Diabetes Self Management Support Plan outside of ongoing care: Spouse/Family    Learner/s Present:Learners Present: Patient   Barriers to Learning/Change: No Barriers  Expected Compliance: good      Insulin Education:    Insulin Type: No diabetes related medications Reviewed insulin pen education material  Patient advised to watch Veronica Fee  com instructional on-line video as well  Long acting basal insulin was not started today following discussion of case with Dr Bosch Shown  Patient is agreeable to start insulin therapy if needed on next report  Patient would prefer to avoid Metformin  The patient was instructed on the following:   Insulin administration times, insulin action   Hypoglycemia signs, symptoms and treatment  Advised patient to test blood sugar at 3:00 am for first 3 mornings following insulin start to monitor for hypoglycemia   Increase in maternal-fetal surveillance with insulin initiation   Side effects of hyperglycemia in pregnancy including macrosomia,  hypoglycemia, polyhydramnios, pre-term labor and stillbirth     Continue to monitor blood glucoses via fingerstick fasting (goal 60 mg/dl to 90 mg/dl) and two hours post prandial (goal less than 120 mg/dl)   Non-stress testing two times weekly and SVETLANA testing beginning at 32 weeks gestation   Ultrasounds every 4 weeks at the [facility] Maternal F[facility]   HbA1c every 6 to 8 weeks      Date to report blood sugars: Tuesday, 8/17 via glucose flow sheet ordered today    Follow Up Date: 9/9/21 virtual visit    Terri Lewis RD,LDN,CDE  Diabetes Educator  [facility] Maternal Fetal [facility]  Diabetes in Pregnancy Program  [address] [place] [address],[address]  [address]

## 2021-08-12 DIAGNOSIS — O99.810 ABNORMAL MATERNAL GLUCOSE TOLERANCE, COMPLICATING PREGNANCY: ICD-10-CM

## 2021-08-12 RX ORDER — LANCETS 33 GAUGE
EACH MISCELLANEOUS
Qty: 100 EACH | Refills: 0 | Status: SHIPPED | OUTPATIENT
Start: 2021-08-12 | End: 2021-09-08 | Stop reason: SDUPTHER

## 2021-08-15 ENCOUNTER — HOSPITAL ENCOUNTER (EMERGENCY)
Facility: HOSPITAL | Age: 36
Discharge: HOME | End: 2021-08-15
Attending: EMERGENCY MEDICINE
Payer: COMMERCIAL

## 2021-08-15 ENCOUNTER — HOSPITAL ENCOUNTER (OUTPATIENT)
Facility: HOSPITAL | Age: 36
Discharge: HOME | End: 2021-08-15
Attending: OBSTETRICS & GYNECOLOGY | Admitting: OBSTETRICS & GYNECOLOGY
Payer: COMMERCIAL

## 2021-08-15 VITALS
DIASTOLIC BLOOD PRESSURE: 74 MMHG | HEART RATE: 85 BPM | SYSTOLIC BLOOD PRESSURE: 123 MMHG | TEMPERATURE: 98.7 F | OXYGEN SATURATION: 98 % | RESPIRATION RATE: 18 BRPM

## 2021-08-15 VITALS
BODY MASS INDEX: 34.55 KG/M2 | OXYGEN SATURATION: 98 % | TEMPERATURE: 99 F | HEIGHT: 60 IN | SYSTOLIC BLOOD PRESSURE: 130 MMHG | WEIGHT: 176 LBS | DIASTOLIC BLOOD PRESSURE: 69 MMHG | RESPIRATION RATE: 20 BRPM | HEART RATE: 76 BPM

## 2021-08-15 DIAGNOSIS — O26.899 ABDOMINAL PAIN IN PREGNANCY: Primary | ICD-10-CM

## 2021-08-15 DIAGNOSIS — R10.9 ABDOMINAL PAIN IN PREGNANCY: Primary | ICD-10-CM

## 2021-08-15 PROBLEM — K21.9 GERD (GASTROESOPHAGEAL REFLUX DISEASE): Status: ACTIVE | Noted: 2021-08-15

## 2021-08-15 PROBLEM — Z3A.25 25 WEEKS GESTATION OF PREGNANCY: Status: ACTIVE | Noted: 2021-08-15

## 2021-08-15 LAB — GLUCOSE SERPL-MCNC: 80 MG/DL (ref 65–140)

## 2021-08-15 PROCEDURE — 59025 FETAL NON-STRESS TEST: CPT

## 2021-08-15 PROCEDURE — NC001 PR NO CHARGE: Performed by: OBSTETRICS & GYNECOLOGY

## 2021-08-15 PROCEDURE — 99283 EMERGENCY DEPT VISIT LOW MDM: CPT

## 2021-08-15 PROCEDURE — 99282 EMERGENCY DEPT VISIT SF MDM: CPT | Performed by: PHYSICIAN ASSISTANT

## 2021-08-15 PROCEDURE — 99214 OFFICE O/P EST MOD 30 MIN: CPT

## 2021-08-15 PROCEDURE — 82948 REAGENT STRIP/BLOOD GLUCOSE: CPT

## 2021-08-15 RX ORDER — PANTOPRAZOLE SODIUM 40 MG/1
40 TABLET, DELAYED RELEASE ORAL
Status: COMPLETED | OUTPATIENT
Start: 2021-08-15 | End: 2021-08-15

## 2021-08-15 RX ORDER — PANTOPRAZOLE SODIUM 40 MG/1
40 TABLET, DELAYED RELEASE ORAL
Status: DISCONTINUED | OUTPATIENT
Start: 2021-08-16 | End: 2021-08-15

## 2021-08-15 RX ORDER — PANTOPRAZOLE SODIUM 40 MG/1
40 INJECTION, POWDER, FOR SOLUTION INTRAVENOUS ONCE
Status: DISCONTINUED | OUTPATIENT
Start: 2021-08-15 | End: 2021-08-15

## 2021-08-15 RX ADMIN — PANTOPRAZOLE SODIUM 40 MG: 40 TABLET, DELAYED RELEASE ORAL at 18:50

## 2021-08-15 NOTE — DISCHARGE INSTR - AVS FIRST PAGE
Please call your doctor or return to triage if you begin to experience worsening abdominal pain, vaginal bleeding, leakage of fluid or decreased fetal movement

## 2021-08-15 NOTE — DISCHARGE INSTRUCTIONS
Pregnancy at 23 to 26 [address]:   What changes are happening in my body? You are now close to or at the beginning of the third trimester  The third trimester starts at 24 weeks and ends with delivery  As your baby gets larger, you may develop certain symptoms  These may include pain in your back or down the sides of your abdomen  You may also have stretch marks on your abdomen, breasts, thighs, or buttocks  You may also have constipation  How do I care for myself at this stage of my pregnancy? · Eat a variety of healthy foods  Healthy foods include fruits, vegetables, whole-grain breads, low-fat dairy foods, beans, lean meats, and fish  Drink liquids as directed  Ask how much liquid to drink each day and which liquids are best for you  Limit caffeine to less than 200 milligrams each day  Limit your intake of fish to 2 servings each week  Choose fish low in mercury such as canned light tuna, shrimp, salmon, cod, or tilapia  Do not  eat fish high in mercury such as swordfish, tilefish, vern mackerel, and shark  · Manage back pain  Do not stand for long periods of time or lift heavy items  Use good posture while you stand, squat, or bend  Wear low-heeled shoes with good support  Rest may also help to relieve back pain  Ask your healthcare provider about exercises you can do to strengthen your back muscles  · Take prenatal vitamins as directed  Your need for certain vitamins and minerals, such as folic acid, increases during pregnancy  Prenatal vitamins provide some of the extra vitamins and minerals you need  Prenatal vitamins may also help to decrease the risk of certain birth defects  · Talk to your healthcare provider about exercise  Moderate exercise can help you stay fit  Your healthcare provider will help you plan an exercise program that is safe for you during pregnancy  · Do not smoke    Smoking increases your risk of a miscarriage and other health problems during your pregnancy  Smoking can cause your baby to be born too early or weigh less at birth  Ask your healthcare provider for information if you need help quitting  · Do not drink alcohol  Alcohol passes from your body to your baby through the placenta  It can affect your baby's brain development and cause fetal alcohol syndrome (FAS)  FAS is a group of conditions that causes mental, behavior, and growth problems  · Talk to your healthcare provider before you take any medicines  Many medicines may harm your baby if you take them when you are pregnant  Do not take any medicines, vitamins, herbs, or supplements without first talking to your healthcare provider  Never use illegal or street drugs (such as marijuana or cocaine) while you are pregnant  What are some safety tips during pregnancy? · Avoid hot tubs and saunas  Do not use a hot tub or sauna while you are pregnant, especially during your first trimester  Hot tubs and saunas may raise your baby's temperature and increase the risk of birth defects  · Avoid toxoplasmosis  This is an infection caused by eating raw meat or being around infected cat feces  It can cause birth defects, miscarriages, and other problems  Wash your hands after you touch raw meat  Make sure any meat is well-cooked before you eat it  Avoid raw eggs and unpasteurized milk  Use gloves or ask someone else to clean your cat's litter box while you are pregnant  What changes are happening with my baby? By 26 weeks, your baby will weigh about 2 pounds  Your baby will be about 10 inches long from the top of the head to the rump (baby's bottom)  Your baby's movements are much stronger now  Your baby's eyes are almost completely formed and can partially open  Your baby also sleeps and wakes up  What do I need to know about prenatal care? Your healthcare provider will check your blood pressure and weight   You may also need the following:  · A urine test  may also be done to check for sugar and protein  These can be signs of gestational diabetes or infection  Protein in your urine may also be a sign of preeclampsia  Preeclampsia is a condition that can develop during week 20 or later of your pregnancy  It causes high blood pressure, and it can cause problems with your kidneys and other organs  · Fundal height  is a measurement of your uterus to check your baby's growth  This number is usually the same as the number of weeks that you have been pregnant  · Your baby's heart rate  will be checked  When should I seek immediate care? · You develop a severe headache that does not go away  · You have new or increased vision changes, such as blurred or spotted vision  · You have new or increased swelling in your face or hands  · You have vaginal spotting or bleeding  · Your water broke or you feel warm water gushing or trickling from your vagina  When should I contact my healthcare provider? · You have abdominal cramps, pressure, or tightening  · You have a change in vaginal discharge  · You have light bleeding  · You have chills or a fever  · You have vaginal itching, burning, or pain  · You have yellow, green, white, or foul-smelling vaginal discharge  · You have pain or burning when you urinate, less urine than usual, or pink or bloody urine  · You have questions or concerns about your condition or care  CARE AGREEMENT:   You have the right to help plan your care  Learn about your health condition and how it may be treated  Discuss treatment options with your healthcare providers to decide what care you want to receive  You always have the right to refuse treatment  The above information is an  only  It is not intended as medical advice for individual conditions or treatments  Talk to your doctor, nurse or pharmacist before following any medical regimen to see if it is safe and effective for you    © Copyright [employer] 2021 Information is for [employer] use only and may not be sold, redistributed or otherwise used for commercial purposes   All illustrations and images included in CareNotes® are the copyrighted property of A D A M , Inc  or [facility] Sonia Hurd

## 2021-08-15 NOTE — ED PROVIDER NOTES
History  Chief Complaint   Patient presents with    Abdominal Pain     25 weeks pregnant and having abdominal pains and cramping that started yesterday, denies bleeding  28 yo female  25 weeks pregnant here with abdominal pain for two days  RUQ pain  Waxing and waning  Feels like an "upset stomach"  Mild nausea  No vomiting  Loose stool today  Mild headache  No vision changes  Denies fever, chills, chest pain or sob  History provided by:  Patient   used: No    Abdominal Pain  Pain location:  RUQ  Pain quality: aching    Pain radiates to:  Does not radiate  Pain severity:  Moderate  Onset quality:  Gradual  Duration:  2 days  Timing:  Intermittent  Progression:  Waxing and waning  Chronicity:  New  Context comment:  Pregnant  Relieved by:  Nothing  Worsened by:  Nothing  Associated symptoms: diarrhea    Associated symptoms: no chest pain, no chills, no cough, no dysuria, no fever, no hematuria, no shortness of breath, no sore throat and no vomiting        Prior to Admission Medications   Prescriptions Last Dose Informant Patient Reported? Taking? Blood Glucose Monitoring Suppl (OneTouch Verio Flex System) w/Device KIT   No No   Sig: Test 4 times daily for 1 week   Ferrous Sulfate (Iron) 28 MG TABS   Yes No   Sig: Take by mouth    OneTouch Delica Lancets 67O MISC   No No   Sig: Use 4 a day or as directed  Gestational diabetes  OneTouch Verio test strip   No No   Sig: Test 4 times per day or as directed  Gestational diabetes     Prenatal Vit-Fe Fumarate-FA (PRENATAL 1+1 PO)  Self Yes No   Sig: Take by mouth   aspirin 81 mg chewable tablet  Self No No   Sig: Chew 2 tablets (162 mg total) daily   omeprazole (PriLOSEC) 20 mg delayed release capsule   No No   Sig: Take 2 capsules by mouth daily for 14 days   ondansetron (ZOFRAN-ODT) 4 mg disintegrating tablet   No No   Sig: Take 1 tablet by mouth every 6 (six) hours as needed for nausea or vomiting for up to 3 days   Patient not taking: Reported on 2021   pantoprazole (PROTONIX) 40 mg tablet   Yes No   Sig: Take 40 mg by mouth daily   Patient not taking: Reported on 2021      Facility-Administered Medications: None       Past Medical History:   Diagnosis Date    Anxiety     no tx or counseling    CTS (carpal tunnel syndrome)     2019    Gestational diabetes 2014    tx during pregnancy       Past Surgical History:   Procedure Laterality Date     SECTION      fetal heartrate was erratic    CHOLECYSTECTOMY LAPAROSCOPIC N/A 10/16/2018    Procedure: CHOLECYSTECTOMY LAPAROSCOPIC w/ IOC;  Surgeon: Wyline Schilder, MD;  Location: [facility];  Service: General       Family History   Problem Relation Age of Onset    Fibromyalgia Mother     Alcohol abuse Mother     Substance Abuse Mother     Diabetes Father     Skin cancer Father     Blindness Brother     Cerebral palsy Brother     Strabismus Son     Cancer Maternal Grandmother         unkown type    Diabetes Paternal Grandmother     Glaucoma Paternal [de-identified]     Colon cancer Paternal Grandfather     Galactosemia Half-Brother      I have reviewed and agree with the history as documented  E-Cigarette/Vaping     E-Cigarette/Vaping Substances     Social History     Tobacco Use    Smoking status: Former Smoker     Packs/day: 0 25     Years: 1 00     Pack years: 0 25     Types: Cigars, Cigarettes     Quit date: 3/1/2021     Years since quittin 4    Smokeless tobacco: Never Used    Tobacco comment: 1-5 cigars a day  no cigs for 3 yrs   Substance Use Topics    Alcohol use: Not Currently     Comment: once a year if any    Drug use: Yes     Types: Marijuana     Comment: once in morning for nausea       Review of Systems   Constitutional: Negative for chills and fever  HENT: Negative for ear pain and sore throat  Eyes: Negative for pain and visual disturbance  Respiratory: Negative for cough and shortness of breath      Cardiovascular: Negative for chest pain and palpitations  Gastrointestinal: Positive for abdominal pain and diarrhea  Negative for vomiting  Genitourinary: Negative for dysuria and hematuria  Musculoskeletal: Negative for arthralgias and back pain  Skin: Negative for color change and rash  Neurological: Positive for headaches  Negative for seizures and syncope  All other systems reviewed and are negative  Physical Exam  Physical Exam  Vitals and nursing note reviewed  Constitutional:       General: She is not in acute distress  Appearance: She is well-developed  HENT:      Head: Normocephalic and atraumatic  Eyes:      Conjunctiva/sclera: Conjunctivae normal    Cardiovascular:      Rate and Rhythm: Normal rate and regular rhythm  Heart sounds: No murmur heard  Pulmonary:      Effort: Pulmonary effort is normal  No respiratory distress  Breath sounds: Normal breath sounds  Abdominal:      Palpations: Abdomen is soft  Tenderness: There is abdominal tenderness in the right upper quadrant  Comments: FHR 160s  Musculoskeletal:      Cervical back: Neck supple  Skin:     General: Skin is warm and dry  Neurological:      Mental Status: She is alert           Vital Signs  ED Triage Vitals   Temperature Pulse Respirations Blood Pressure SpO2   08/15/21 1526 08/15/21 1525 08/15/21 1525 08/15/21 1525 08/15/21 1525   98 7 °F (37 1 °C) 85 18 123/74 98 %      Temp Source Heart Rate Source Patient Position - Orthostatic VS BP Location FiO2 (%)   08/15/21 1526 08/15/21 1525 08/15/21 1525 08/15/21 1525 --   Oral Monitor Sitting Left arm       Pain Score       08/15/21 1525       8           Vitals:    08/15/21 1525   BP: 123/74   Pulse: 85   Patient Position - Orthostatic VS: Sitting         Visual Acuity      ED Medications  Medications - No data to display    Diagnostic Studies  Results Reviewed     None                 No orders to display              Procedures  Procedures         ED Course SBIRT 20yo+      Most Recent Value   SBIRT (22 yo +)   In order to provide better care to our patients, we are screening all of our patients for alcohol and drug use  Would it be okay to ask you these screening questions? Yes Filed at: 08/15/2021 1531   Initial Alcohol Screen: US AUDIT-C    1  How often do you have a drink containing alcohol?  0 Filed at: 08/15/2021 1531   2  How many drinks containing alcohol do you have on a typical day you are drinking? 0 Filed at: 08/15/2021 1531   3a  Male UNDER 65: How often do you have five or more drinks on one occasion? 0 Filed at: 08/15/2021 1531   3b  FEMALE Any Age, or MALE 65+: How often do you have 4 or more drinks on one occassion? 0 Filed at: 08/15/2021 1531   Audit-C Score  0 Filed at: 08/15/2021 1531   RICK: How many times in the past year have you    Used an illegal drug or used a prescription medication for non-medical reasons? Never Filed at: 08/15/2021 1531                    MDM  Number of Diagnoses or Management Options  Abdominal pain in pregnancy: new and requires workup  Diagnosis management comments: DDx including but not limited to: threatened , ectopic pregnancy, ovarian torsion, ovarian cyst, ruptured ovarian cyst, mesenteric adenitis, gastritis, PUD, GERD, gastroparesis, pancreatitis, hepatitis, IBD, IBS, ileus, colitis, enteritis, renal colic, pyelonephritis, UTI, biliary colic, choledocholithiasis, perforated viscus, splenic etiology, constipation; doubt bowel obstruction or appendicitis or cholecystitis or volvulus  Plan: Discuss with on call OB    Risk of Complications, Morbidity, and/or Mortality  Presenting problems: moderate  Management options: low  General comments: 28 yo female with abdominal pain  Given viable gestational age will transfer to L&D for NST  Offered ambulance transfer but pt declined and would like to go by private transportation  Stable at time of transport       Patient Progress  Patient progress: stable      Disposition  Final diagnoses:   Abdominal pain in pregnancy     Time reflects when diagnosis was documented in both MDM as applicable and the Disposition within this note     Time User Action Codes Description Comment    8/15/2021  3:51 PM Temo Robertson Add [O26 899,  R10 9] Abdominal pain in pregnancy       ED Disposition     ED Disposition Condition Date/Time Comment    Discharge Stable Sun Aug 15, 2021  3:50 PM Neli Agudelo discharge to home/self care  Follow-up Information     Follow up With Specialties Details Why [address] to   [facility], [place], [address] [facility]          Patient's Medications   Discharge Prescriptions    No medications on file     No discharge procedures on file      PDMP Review     None          ED Provider  Electronically Signed by           Keyona Vargas PA-C  08/15/21 1559

## 2021-08-15 NOTE — LETTER
[address] [facility] AND DELIVERY  [address] [place] [address]  Dept: [phone]    August 15, 2021     Patient: Melinda Harding   YOB: 1985   Date of Visit: 8/15/2021       To Whom it May Concern:    Melinda Harding is under my professional care  She was seen in the hospital from 8/15/2021   to 08/15/21  She may return to school on 8/16/21 without limitations  If you have any questions or concerns, please don't hesitate to call           Sincerely,          Gema Barone MD

## 2021-08-15 NOTE — PROCEDURES
Hanna Mando, a  at 25w1d with an SKYLER of 2021, by Last Menstrual Period, was seen at [address] for the following procedure(s): $Procedure Type: NST]    Nonstress Test  Reason for NST: Other (Comment) (abdominal pain)  Variability: Moderate  Decelerations: None  Accelerations: Yes  Acoustic Stimulator: No  Baseline: 140 BPM  Uterine Irritability: No  Contractions: Not present                   Interpretation  Nonstress Test Interpretation: Reactive  Overall Impression: Reassuring

## 2021-08-15 NOTE — PROGRESS NOTES
L&D Triage Note - OB/GYN  Sandra Truong 29 y o  female MRN: [id number]  Unit/Bed#: [facility] Encounter: [id number]        Patient is seen by Dearsoumya Tavera    ASSESSMENT/PLAN  Sandra Truong is a 29 y o  [facility] at 25w1d that presents with RUQ/epigastric pain, nausea and diarrhea  1) Abdominal Pain  - seen in [place] ED   - Abdominal pain, nausea, headache X2 days   - Diarrhea X1 day  - denies vomiting, fever/chills  - NST reassuring today   - Significant History of GERD previously taking Prilosec   - Received PO Protonix in triage today and feeling much better           FHT:  Baseline Rate: 140 bpm  Variability: Moderate 6-25 bpm  Accelerations: 10 x 10 (<32 weeks)  Decelerations: None    TOCO:   Contraction Frequency (minutes): irritability      2)  Discharge instructions  - Patient encouraged to resume prepregnancy GERD medications   - Patient instructed to call if experiencing contractions, vaginal bleeding, loss of fluid or decreased fetal movement  - Will follow up with OBGYN in office    D/w Dr Corinne Alves   ______________    SUBJECTIVE    SKYLER: Estimated Date of Delivery: 21    HPI:  29 y o  [facility] 25w1d presents with complaint of right upper quadrant and epigastric pain that started two days ago, reports diarrhea today  States that she has a history of GERD previously on Prilosec  Stopped taking medication once she found out she was pregnant  Denies any fevers or chills  Denies any vomiting  Has history of cholecystectomy  Pregnancy complicated by GDM  Denies any other symptoms including chest pain, cough, shortness breath or changes in vision  Blood pressure within normal limits in triage  Contractions: no  Leakage of fluid: no  Vaginal Bleeding: no  Fetal movement: present    Her obstetrical history is significant for 1 prior low transverse       ROS:  Constitutional: Negative  Respiratory: Negative  Cardiovascular: Negative    Gastrointestinal:  Reports diarrhea today  Physical Exam  GEN: The patient was alert and oriented x3, pleasant well-appearing female in no acute distress  CV: Regular rate  PULM: nonlabored respirations  MSK: Normal gait  Skin: warm, dry  Neuro: no focal deficits  Psych: normal affect and judgement, cooperative   ABD: Gravid, soft, slight tenderness to palpation in right upper quadrant and epigastric regions    OBJECTIVE:  /70   Pulse 81   Temp 99 °F (37 2 °C) (Oral)   Resp 20   Ht 5' (1 524 m)   Wt 79 8 kg (176 lb)   LMP 02/20/2021 (Exact Date)   SpO2 98%   BMI 34 37 kg/m²   Body mass index is 34 37 kg/m²  Labs: No results found for this or any previous visit (from the past 24 hour(s))  Carleen Cadena MD  OB/GYN PGY-1  8/15/2021  6:25 PM      Portions of the record may have been created with voice recognition software  Occasional wrong word or "sound a like" substitutions may have occurred due to the inherent limitations of voice recognition software    Read the chart carefully and recognize, using context, where substitutions have occurred

## 2021-08-17 ENCOUNTER — TELEPHONE (OUTPATIENT)
Dept: PERINATAL CARE | Facility: OTHER | Age: 36
End: 2021-08-17

## 2021-08-17 ENCOUNTER — DOCUMENTATION (OUTPATIENT)
Dept: PERINATAL CARE | Facility: CLINIC | Age: 36
End: 2021-08-17

## 2021-08-17 ENCOUNTER — ROUTINE PRENATAL (OUTPATIENT)
Dept: OBGYN CLINIC | Facility: CLINIC | Age: 36
End: 2021-08-17
Payer: COMMERCIAL

## 2021-08-17 ENCOUNTER — DOCUMENTATION (OUTPATIENT)
Dept: PERINATAL CARE | Facility: OTHER | Age: 36
End: 2021-08-17

## 2021-08-17 VITALS — DIASTOLIC BLOOD PRESSURE: 72 MMHG | WEIGHT: 174 LBS | BODY MASS INDEX: 33.98 KG/M2 | SYSTOLIC BLOOD PRESSURE: 128 MMHG

## 2021-08-17 DIAGNOSIS — O34.211 MATERNAL CARE DUE TO LOW TRANSVERSE UTERINE SCAR FROM PREVIOUS CESAREAN DELIVERY: ICD-10-CM

## 2021-08-17 DIAGNOSIS — O24.419 GESTATIONAL DIABETES MELLITUS (GDM) IN SECOND TRIMESTER, GESTATIONAL DIABETES METHOD OF CONTROL UNSPECIFIED: ICD-10-CM

## 2021-08-17 DIAGNOSIS — Z86.32 HISTORY OF GESTATIONAL DIABETES MELLITUS: ICD-10-CM

## 2021-08-17 DIAGNOSIS — O09.529 ANTEPARTUM MULTIGRAVIDA OF ADVANCED MATERNAL AGE: ICD-10-CM

## 2021-08-17 DIAGNOSIS — O24.414 INSULIN CONTROLLED GESTATIONAL DIABETES MELLITUS (GDM) IN SECOND TRIMESTER: Primary | ICD-10-CM

## 2021-08-17 DIAGNOSIS — Z34.82 ENCOUNTER FOR SUPERVISION OF OTHER NORMAL PREGNANCY IN SECOND TRIMESTER: Primary | ICD-10-CM

## 2021-08-17 DIAGNOSIS — Z34.82 ENCOUNTER FOR SUPERVISION OF NORMAL PREGNANCY IN MULTIGRAVIDA IN SECOND TRIMESTER: ICD-10-CM

## 2021-08-17 DIAGNOSIS — Z3A.25 25 WEEKS GESTATION OF PREGNANCY: ICD-10-CM

## 2021-08-17 PROCEDURE — 99213 OFFICE O/P EST LOW 20 MIN: CPT | Performed by: STUDENT IN AN ORGANIZED HEALTH CARE EDUCATION/TRAINING PROGRAM

## 2021-08-17 RX ORDER — INSULIN GLARGINE 100 [IU]/ML
INJECTION, SOLUTION SUBCUTANEOUS
Qty: 15 ML | Refills: 0 | Status: SHIPPED | OUTPATIENT
Start: 2021-08-17 | End: 2021-10-07 | Stop reason: SDUPTHER

## 2021-08-17 NOTE — PROGRESS NOTES
29 y o   at 25w3d, here for return OB visit  Feeling well overall and without concerns  Good FM  Denies LOF, VB, contractions  Problem List Items Addressed This Visit        Endocrine    Gestational diabetes mellitus (GDM)     With elevated fasting daily  Does not want metformin, but prefers insulin  To send values today, so will do so after appt  Discussed likely need for insulin    Lab Results   Component Value Date    HGBA1C 5 4 2021               Genitourinary    Maternal care due to low transverse uterine scar from previous  delivery     Desires repeat without salpingectomy  Traumatic experience with last birth and does not want to labor  Had Nexplanon previously and would like this again postpartum   Discussed timing of LTCS somewhat dependent on control of diabetes            Other    Encounter for supervision of normal pregnancy in multigravida in second trimester    Antepartum multigravida of advanced maternal age     -precautions reviewed  -prepregnancy BMI 33 with goal weight gain 11-20#: TWG = 4#              Other Visit Diagnoses     Encounter for supervision of other normal pregnancy in second trimester    -  Primary    Relevant Orders    CBC and differential    RPR

## 2021-08-17 NOTE — ASSESSMENT & PLAN NOTE
With elevated fasting daily  Does not want metformin, but prefers insulin  To send values today, so will do so after appt   Discussed likely need for insulin    Lab Results   Component Value Date    HGBA1C 5 4 08/05/2021

## 2021-08-17 NOTE — TELEPHONE ENCOUNTER
lvm to re schedule pt for growth u/s around 9/7 (28 weeks GA)  Pt was scheduled for 8/20, appt was too early and was ok'd by Dr Che Gonzalez to move ahead to 28 weeks GA

## 2021-08-17 NOTE — PROGRESS NOTES
Reviewed patient record, she is scheduled for ultrasound this week  She is newly diagnosed with GDM and should follow-up closely with diabetes in pregnancy program  Assessment of fetal growth/missed anatomy isn't clinically indicated until 28 weeks gestation, recommend rescheduling follow-up to closer to 28 weeks gestation

## 2021-08-17 NOTE — PROGRESS NOTES
Pt is here for routine ob visit   No concerns at this time  Unable to void   No LOF,VB,Contractions   +FM   28wk lab orders given

## 2021-08-17 NOTE — ASSESSMENT & PLAN NOTE
Desires repeat without salpingectomy  Traumatic experience with last birth and does not want to labor  Had Nexplanon previously and would like this again postpartum   Discussed timing of LTCS somewhat dependent on control of diabetes

## 2021-08-17 NOTE — PROGRESS NOTES
Date: 08/17/21  Shelia Matthews  1985  Estimated Date of Delivery: 11/27/21  23w2d  OB/GYN: [facility]  Diagnosis:   History of GDM 2014,     OB note today: With elevated fasting daily  Does not want metformin, but prefers insulin  To send values today, so will do so after appt  Discussed likely need for insulin    Patient reported on a phone conversation today her son had eye surgery yesterday and meals and snacks were not eaten on time possibly affected FBG measurement today  Patient completed class 1 and class 2 combination with insulin pen education on 8/10/21  Start Lantus-16 units at 10-11:00 PM daily  Diet: Continue 1800 Gestational diabetes meal plan; 3 meals and 3 snacks with change to bedtime snack of 1 serving CHO and 2-3 ounces of protein    Advised to be cautious of carbohydrate portion sizes  Admits to eating food not recommended on diet such as regular sugar sweetened soda and fried foods  Advised to use MyFitness Pal or Calorie [employer] for guidance on meals when dining out  SMBG: Checks blood sugar 4 times per day; fasting and 2 hour start of each meal  Requested patient check 3 am blood sugar with the start of insulin therapy  Meter: One Touch verio glucose meter  Activity: Walks 30 minutes daily, follow OB recommendations  Support System: Significant Other  Patient Goal: Patient will eat 3 meals and 3 snacks daily including the correct combination of carbohydrates, protein and fat       Labs  8/5/21 A1c- 5 4%  Noted 8/5 CMP results    Ultrasounds  07/13/2021  Normal growth and SVETLANA   Next US scheduled for 8/20/2021    Further fetal surveillance  Beginning at 32 weeks, NST / SVETLANA twice a week, if medications added to regimen    Kermit Bolden RD,LDN,CDE  Diabetes [facility] Pregnancy Program

## 2021-08-22 DIAGNOSIS — O99.810 ABNORMAL MATERNAL GLUCOSE TOLERANCE, COMPLICATING PREGNANCY: ICD-10-CM

## 2021-08-23 RX ORDER — BLOOD SUGAR DIAGNOSTIC
STRIP MISCELLANEOUS
Qty: 100 STRIP | Refills: 0 | Status: SHIPPED | OUTPATIENT
Start: 2021-08-23 | End: 2021-10-07 | Stop reason: SDUPTHER

## 2021-08-26 ENCOUNTER — DOCUMENTATION (OUTPATIENT)
Dept: PERINATAL CARE | Facility: CLINIC | Age: 36
End: 2021-08-26

## 2021-08-26 NOTE — PROGRESS NOTES
Date: 08/26/21  Elma Canales  1985  Estimated Date of Delivery: 11/27/21  26w5d  OB/GYN: Domonique Will[facility]  Diagnosis: GDM, insulin controlled, second trimester  History of GDM 2014,       Plan:  Increase Lantus from 16 units up to 18 units at 10-11:00 PM daily      -Patient completed class 1 and class 2 combination with insulin pen education on 8/10/21  Follow-up with Brinda Brennan on 09/09/2021   -several post meal elevations - patient has admitted to not adhering to diet   Diet: 1800 Gestational diabetes meal plan; 3 meals and 3 snacks    -bedtime snack of 1 serving CHO and 2-3 ounces of protein  SMBG: Checks blood sugar 4 times per day; fasting and 2 hour start of each meal  Requested patient check 3 am blood sugar with the start of insulin therapy  Meter: One Touch verio glucose meter  Activity: Walks 30 minutes daily, follow OB recommendations  Support System: Significant Other  Patient Goal: Patient will eat 3 meals and 3 snacks daily including the correct combination of carbohydrates, protein and fat       Labs  8/5/21 A1c- 5 4%  8/5 CMP completed  Next A1c due by 09/30/2021    Ultrasounds  07/13/2021  Normal growth and SVETLANA   Next US scheduled for 09/07/2021 and 10/05/2021    Further fetal surveillance  Beginning at 32 weeks, NST / SVETLANA twice a week    Kim Vargas RN

## 2021-09-03 ENCOUNTER — DOCUMENTATION (OUTPATIENT)
Dept: PERINATAL CARE | Facility: CLINIC | Age: 36
End: 2021-09-03

## 2021-09-03 NOTE — PROGRESS NOTES
Date: 09/03/21  Elma Canales  1985  Estimated Date of Delivery: 11/27/21  27w6d  OB/GYN: Domonique Willson  Diagnosis: GDM, insulin controlled, second trimester  History of GDM 2014,       Plan:  Increase Lantus from 18 units up to 20 units at 10-11:00 PM daily      -Patient completed class 1 and class 2 combination with insulin pen education on 8/10/21  Follow-up with Brinda Brennan on 09/09/2021   -several post meal elevations - patient has admitted to not adhering to diet    -missing after breakfast readings  Diet: 1800 Gestational diabetes meal plan; 3 meals and 3 snacks    -bedtime snack of 1 serving CHO and 2-3 ounces of protein  SMBG: Checks blood sugar 4 times per day; fasting and 2 hour start of each meal  Requested patient check 3 am blood sugar with the start of insulin therapy  Meter: One Touch verio glucose meter  Activity: Walks 30 minutes daily, follow OB recommendations  Support System: Significant Other  Patient Goal: Patient will eat 3 meals and 3 snacks daily including the correct combination of carbohydrates, protein and fat       Labs  8/5/21 A1c- 5 4%  8/5 CMP completed  Next A1c due by 09/30/2021    Ultrasounds  07/13/2021  Normal growth and SVETLANA   Next US scheduled for 09/07/2021 and 10/05/2021    Further fetal surveillance  Beginning at 32 weeks, NST / SVETLANA twice a week    Kim Vargas RN

## 2021-09-07 ENCOUNTER — ROUTINE PRENATAL (OUTPATIENT)
Dept: OBGYN CLINIC | Facility: CLINIC | Age: 36
End: 2021-09-07
Payer: COMMERCIAL

## 2021-09-07 ENCOUNTER — ULTRASOUND (OUTPATIENT)
Dept: PERINATAL CARE | Facility: OTHER | Age: 36
End: 2021-09-07
Payer: COMMERCIAL

## 2021-09-07 VITALS
BODY MASS INDEX: 34.95 KG/M2 | DIASTOLIC BLOOD PRESSURE: 73 MMHG | SYSTOLIC BLOOD PRESSURE: 110 MMHG | HEIGHT: 60 IN | WEIGHT: 178 LBS | HEART RATE: 88 BPM

## 2021-09-07 VITALS — DIASTOLIC BLOOD PRESSURE: 70 MMHG | SYSTOLIC BLOOD PRESSURE: 122 MMHG | BODY MASS INDEX: 34.88 KG/M2 | WEIGHT: 178.6 LBS

## 2021-09-07 DIAGNOSIS — O24.414 INSULIN CONTROLLED GESTATIONAL DIABETES MELLITUS (GDM) IN SECOND TRIMESTER: ICD-10-CM

## 2021-09-07 DIAGNOSIS — O99.211 OBESITY COMPLICATING PREGNANCY, FIRST TRIMESTER: ICD-10-CM

## 2021-09-07 DIAGNOSIS — O24.414 INSULIN CONTROLLED GESTATIONAL DIABETES MELLITUS (GDM) IN THIRD TRIMESTER: ICD-10-CM

## 2021-09-07 DIAGNOSIS — O09.529 ANTEPARTUM MULTIGRAVIDA OF ADVANCED MATERNAL AGE: ICD-10-CM

## 2021-09-07 DIAGNOSIS — Z36.89 ENCOUNTER FOR ULTRASOUND TO ASSESS FETAL GROWTH: ICD-10-CM

## 2021-09-07 DIAGNOSIS — Z3A.28 28 WEEKS GESTATION OF PREGNANCY: Primary | ICD-10-CM

## 2021-09-07 DIAGNOSIS — Z36.2 ENCOUNTER FOR FOLLOW-UP ULTRASOUND OF FETAL ANATOMY: ICD-10-CM

## 2021-09-07 DIAGNOSIS — Z34.82 ENCOUNTER FOR SUPERVISION OF OTHER NORMAL PREGNANCY IN SECOND TRIMESTER: Primary | ICD-10-CM

## 2021-09-07 DIAGNOSIS — O34.211 MATERNAL CARE DUE TO LOW TRANSVERSE UTERINE SCAR FROM PREVIOUS CESAREAN DELIVERY: ICD-10-CM

## 2021-09-07 LAB
SL AMB  POCT GLUCOSE, UA: NORMAL
SL AMB POCT URINE PROTEIN: NORMAL

## 2021-09-07 PROCEDURE — 76816 OB US FOLLOW-UP PER FETUS: CPT | Performed by: OBSTETRICS & GYNECOLOGY

## 2021-09-07 PROCEDURE — 99213 OFFICE O/P EST LOW 20 MIN: CPT | Performed by: STUDENT IN AN ORGANIZED HEALTH CARE EDUCATION/TRAINING PROGRAM

## 2021-09-07 PROCEDURE — 99214 OFFICE O/P EST MOD 30 MIN: CPT | Performed by: OBSTETRICS & GYNECOLOGY

## 2021-09-07 NOTE — ASSESSMENT & PLAN NOTE
28 yo  at 28+3 here for routine ob visit  No contractions, leaking or bleeding  Good fetal movement  New onset itching and soreness at vaginal opening  Exam consistent with yeast vaginitis- will do 3 day monistat  No other concerns  Return in 2 wks

## 2021-09-07 NOTE — PROGRESS NOTES
Pt presents for routine prenatal visit   Denies any bleeding, cramping, LOF   +FM   C/o soreness/dryness and vaginal itching

## 2021-09-07 NOTE — PROGRESS NOTES
[address]: Ms Tiffanie Garcia was seen today at 28w3d for fetal growth and followup missed anatomy ultrasound  See ultrasound report under "OB Procedures" tab    Please don't hesitate to contact our office with any concerns or questions   -Rebecca Peabody, MD

## 2021-09-07 NOTE — LETTER
September 7, 2021     Sharon AroraJoseph [address] [facility] [address]    Patient: Kirti Fernandes   YOB: 1985   Date of Visit: 9/7/2021       Dear Portia Buchanan: Thank you for referring Kirti Fernandes to me for evaluation  Below are my notes for this consultation  If you have questions, please do not hesitate to call me  I look forward to following your patient along with you  Sincerely,        Fabienne Galloway MD        CC: No Recipients  Fabienne Galloway MD  9/7/2021  3:53 PM  Sign when Signing Visit  [address]: Ms Yumiko Aguayo was seen today at 28w3d for fetal growth and followup missed anatomy ultrasound  See ultrasound report under "OB Procedures" tab    Please don't hesitate to contact our office with any concerns or questions   -Fabienne Galloway MD

## 2021-09-07 NOTE — PROGRESS NOTES
Gestational diabetes mellitus (GDM)  Follows with DP  On insulin  Lab Results   Component Value Date    HGBA1C 5 4 2021       Maternal care due to low transverse uterine scar from previous  delivery  Plans repeat  Antepartum multigravida of advanced maternal age  30 yo  at 35+2 here for routine ob visit  No contractions, leaking or bleeding  Good fetal movement  New onset itching and soreness at vaginal opening  Exam consistent with yeast vaginitis- will do 3 day monistat  No other concerns  Return in 2 wks      Obesity complicating pregnancy, first trimester  TWG 8# goal 11-20#

## 2021-09-09 ENCOUNTER — TELEPHONE (OUTPATIENT)
Dept: PERINATAL CARE | Facility: CLINIC | Age: 36
End: 2021-09-09

## 2021-09-09 NOTE — TELEPHONE ENCOUNTER
Patient was a no show for a follow up virtual diabetes education appointment today  Attempted to call patient to assist if she was having difficulty connecting to the virtual visit  A voicemail message was left to call Ny Kan MA if having difficulty connecting  In-basket message sent to  to reschedule appointment

## 2021-09-17 ENCOUNTER — DOCUMENTATION (OUTPATIENT)
Dept: PERINATAL CARE | Facility: CLINIC | Age: 36
End: 2021-09-17

## 2021-09-17 PROBLEM — Z34.03 ENCOUNTER FOR SUPERVISION OF NORMAL FIRST PREGNANCY IN THIRD TRIMESTER: Status: ACTIVE | Noted: 2021-07-20

## 2021-09-17 NOTE — PATIENT INSTRUCTIONS
Valuable Online Resource:    [facility] pregnancy essential guide    [email or web address] [email or web address]      On the right side of the screen is a 50 page guide providing valuable information about your entire pregnancy  On the left hand side of the site you will see several other links to great information and resources that [address] [place] [address] [facility] offers     If you click on the tab that says "Pregnancy and Birth Packet" this opens another 150 page guide to labor and delivery information as well as breast feeding information,  care, pediatricians, car seat safety and much more     The [facility] and [address] tab has a virtual tour of the new L&D unit, as well as valuable information about classes that are offered, breast feeding support, support groups and much more  Click around and enjoy all we have to offer! Please note that all information in regards to locations and visiting hours have not been updated due to COVID    We only deliver our babies at one location which is at [facility], [address]      Pregnancy at [address] to [address]:   What changes are happening to your body: You may continue to have symptoms such as shortness of breath, heartburn, contractions, or swelling of your ankles and feet  You may be gaining about 1 pound a week now  Seek care immediately if:   · You develop a severe headache that does not go away  · You have new or increased vision changes, such as blurred or spotted vision  · You have new or increased swelling in your face or hands  · You have vaginal spotting or bleeding  · Your water broke or you feel warm water gushing or trickling from your vagina  Contact your healthcare provider if:   · You have more than 5 contractions in 1 hour  · You notice any changes in your baby's movements  · You have abdominal cramps, pressure, or tightening      · You have a change in vaginal discharge  · You have chills or a fever  · You have vaginal itching, burning, or pain  · You have yellow, green, white, or foul-smelling vaginal discharge  · You have pain or burning when you urinate, less urine than usual, or pink or bloody urine  · You have questions or concerns about your condition or care  How to care for yourself at this stage of your pregnancy:   · Eat a variety of healthy foods  Healthy foods include fruits, vegetables, whole-grain breads, low-fat dairy foods, beans, lean meats, and fish  Drink liquids as directed  Ask how much liquid to drink each day and which liquids are best for you  Limit caffeine to less than 200 milligrams each day  Limit your intake of fish to 2 servings each week  Choose fish low in mercury such as canned light tuna, shrimp, salmon, cod, or tilapia  Do not  eat fish high in mercury such as swordfish, tilefish, vern mackerel, and shark  · Manage heartburn  by eating 4 or 5 small meals each day instead of large meals  Avoid spicy food  · Manage swelling  by lying down and putting your feet up  · Take prenatal vitamins as directed  Your need for certain vitamins and minerals, such as folic acid, increases during pregnancy  Prenatal vitamins provide some of the extra vitamins and minerals you need  Prenatal vitamins may also help to decrease the risk of certain birth defects  · Talk to your healthcare provider about exercise  Moderate exercise can help you stay fit  Your healthcare provider will help you plan an exercise program that is safe for you during pregnancy  · Do not smoke  Smoking increases your risk of a miscarriage and other health problems during your pregnancy  Smoking can cause your baby to be born too early or weigh less at birth  Ask your healthcare provider for information if you need help quitting  · Do not drink alcohol  Alcohol passes from your body to your baby through the placenta   It can affect your baby's brain development and cause fetal alcohol syndrome (FAS)  FAS is a group of conditions that causes mental, behavior, and growth problems  · Talk to your healthcare provider before you take any medicines  Many medicines may harm your baby if you take them when you are pregnant  Do not take any medicines, vitamins, herbs, or supplements without first talking to your healthcare provider  Never use illegal or street drugs (such as marijuana or cocaine) while you are pregnant  Safety tips during pregnancy:   · Avoid hot tubs and saunas  Do not use a hot tub or sauna while you are pregnant, especially during your first trimester  Hot tubs and saunas may raise your baby's temperature and increase the risk of birth defects  · Avoid toxoplasmosis  This is an infection caused by eating raw meat or being around infected cat feces  It can cause birth defects, miscarriages, and other problems  Wash your hands after you touch raw meat  Make sure any meat is well-cooked before you eat it  Avoid raw eggs and unpasteurized milk  Use gloves or ask someone else to clean your cat's litter box while you are pregnant  Changes that are happening with your baby:  By 34 weeks, your baby may weigh more than 5 pounds  Your baby will be about 12 ½ inches long from the top of the head to the rump (baby's bottom)  Your baby is gaining about ½ pound a week  Your baby's eyes open and close now  Your baby's kicks and movements are more forceful at this time  What you need to know about prenatal care: Your healthcare provider will check your blood pressure and weight  You may also need the following:  · A urine test  may also be done to check for sugar and protein  These can be signs of gestational diabetes or infection  Protein in your urine may also be a sign of preeclampsia  Preeclampsia is a condition that can develop during week 20 or later of your pregnancy   It causes high blood pressure, and it can cause problems with your kidneys and other organs  · A Tdap vaccine  may be recommended by your healthcare provider  · Fundal height  is a measurement of your uterus to check your baby's growth  This number is usually the same as the number of weeks that you have been pregnant  Your healthcare provider may also check your baby's position  · Your baby's heart rate  will be checked  © Copyright [employer] 2021 Information is for End User's use only and may not be sold, redistributed or otherwise used for commercial purposes  All illustrations and images included in CareNotes® are the copyrighted property of [employer]  or [address] [place] [address]  The above information is an  only  It is not intended as medical advice for individual conditions or treatments  Talk to your doctor, nurse or pharmacist before following any medical regimen to see if it is safe and effective for you

## 2021-09-17 NOTE — ASSESSMENT & PLAN NOTE
Sandra Truong is a 29 y o    29w6d who presents for routine PNV  Insulin managed GDM, Lantus increase to 24 units at HS on 21 d/t elevated fasting BS, has appt with nutrition for help with meals   28 week labs reviewed:   Denies OB complaints  Good fetal movement  Denies contractions, cramping, leakage of fluid or vaginal bleeding  Will need Tdap at next appt, pt aware     Reviewed  labor precautions and FKCs     Pregnancy Essential guide and Baby and Me web site recommended

## 2021-09-17 NOTE — PROGRESS NOTES
Date: 09/17/21  Zohaib Waterman  1985  Estimated Date of Delivery: 11/27/21  29w6d  OB/GYN: Julianne Boswell  Diagnosis: GDM, insulin controlled, second trimester  History of GDM 2014,       Plan:  Increase Lantus from 20 units up to 24 (20%) units at 10-11:00 PM daily      -Patient completed class 1 and class 2 combination with insulin pen education on 8/10/21  No- show needs to reschedule follow-up with PEYTON Esparza from 09/09/2021   -several post meal elevations - patient has admitted to not adhering to diet    -missing after breakfast readings  Diet: 1800 Gestational diabetes meal plan; 3 meals and 3 snacks    -bedtime snack of 1 serving CHO and 2-3 ounces of protein  SMBG: Checks blood sugar 4 times per day; fasting and 2 hour start of each meal  Requested patient check 3 am blood sugar with the start of insulin therapy  Meter: One Touch verio glucose meter  Activity: Walks 30 minutes daily, follow OB recommendations  Support System: Significant Other  Patient Goal: Patient will eat 3 meals and 3 snacks daily including the correct combination of carbohydrates, protein and fat       Labs  08/05/2021 A1c- 5 4%  08/05/2021 CMP completed  Next A1c due by 09/30/2021    Ultrasounds  07/13/2021  Normal growth and SVETLANA   09/07/2021 Normal growth and SVETLANA - EFW 59%  Next US scheduled for 10/05/2021    Further fetal surveillance  Beginning at 32 weeks, NST / SVETLANA twice a week    Carlee Bliss RN

## 2021-09-21 ENCOUNTER — ROUTINE PRENATAL (OUTPATIENT)
Dept: OBGYN CLINIC | Facility: CLINIC | Age: 36
End: 2021-09-21
Payer: COMMERCIAL

## 2021-09-21 VITALS
BODY MASS INDEX: 34.36 KG/M2 | WEIGHT: 175 LBS | SYSTOLIC BLOOD PRESSURE: 120 MMHG | HEIGHT: 60 IN | DIASTOLIC BLOOD PRESSURE: 68 MMHG

## 2021-09-21 DIAGNOSIS — Z34.83 PRENATAL CARE, SUBSEQUENT PREGNANCY, THIRD TRIMESTER: Primary | ICD-10-CM

## 2021-09-21 PROBLEM — R10.9 ABDOMINAL PAIN AFFECTING PREGNANCY: Status: RESOLVED | Noted: 2021-08-15 | Resolved: 2021-09-21

## 2021-09-21 PROBLEM — Z3A.28 28 WEEKS GESTATION OF PREGNANCY: Status: RESOLVED | Noted: 2021-08-15 | Resolved: 2021-09-21

## 2021-09-21 PROBLEM — O26.899 ABDOMINAL PAIN AFFECTING PREGNANCY: Status: RESOLVED | Noted: 2021-08-15 | Resolved: 2021-09-21

## 2021-09-21 LAB
SL AMB  POCT GLUCOSE, UA: NEGATIVE
SL AMB POCT URINE PROTEIN: NEGATIVE

## 2021-09-21 PROCEDURE — 99213 OFFICE O/P EST LOW 20 MIN: CPT | Performed by: OBSTETRICS & GYNECOLOGY

## 2021-09-21 NOTE — PROGRESS NOTES
Encounter for supervision of normal first pregnancy in third trimester  Kimberly Juarez is a 29 y o    29w6d who presents for routine PNV  Insulin managed GDM, Lantus increase to 24 units at HS on 21 d/t elevated fasting BS, has appt with nutrition for help with meals   28 week labs reviewed:   Denies OB complaints  Good fetal movement  Denies contractions, cramping, leakage of fluid or vaginal bleeding  Will need Tdap at next appt, pt aware     Reviewed  labor precautions and FKCs     Pregnancy Essential guide and Baby and Me web site recommended     Insulin controlled gestational diabetes mellitus (GDM) in third trimester  Lantus increased to 24 units at HS on 21     Lab Results   Component Value Date    HGBA1C 5 4 2021

## 2021-09-21 NOTE — PROGRESS NOTES
Patient is here for her routine prenatal visit she reports positive fetus movements she reports NO contractions and NO lost of fluid at this time   Patient is planning to bottle feed the baby   Patient reports that she is having a hard time try to eat 6 meals a day

## 2021-09-21 NOTE — ASSESSMENT & PLAN NOTE
Lantus increased to 24 units at HS on 9/20/21     Lab Results   Component Value Date    HGBA1C 5 4 08/05/2021

## 2021-09-23 ENCOUNTER — TELEPHONE (OUTPATIENT)
Dept: OBGYN CLINIC | Facility: CLINIC | Age: 36
End: 2021-09-23

## 2021-09-23 NOTE — LETTER
September 23, 2021    [facility] [id number] [id number]        To Whom it May Concern:    Please be advised that Tiffanie Mehta is under our professional care and her expected date of delivery is 11/27/2021  If you have any questions please contact our office at [phone]  Thank you        Sincerely,    [address] [place] [address]

## 2021-09-23 NOTE — TELEPHONE ENCOUNTER
Pt has meeting with [facility] office and needs a paper stating she is pregnant and her due date faxed to the office         [facility] Fax #  [phone]

## 2021-09-27 ENCOUNTER — DOCUMENTATION (OUTPATIENT)
Dept: PERINATAL CARE | Facility: CLINIC | Age: 36
End: 2021-09-27

## 2021-09-27 DIAGNOSIS — Z3A.12 12 WEEKS GESTATION OF PREGNANCY: ICD-10-CM

## 2021-09-27 DIAGNOSIS — O24.414 INSULIN CONTROLLED GESTATIONAL DIABETES MELLITUS (GDM) IN THIRD TRIMESTER: Primary | ICD-10-CM

## 2021-09-27 DIAGNOSIS — O09.529 ANTEPARTUM MULTIGRAVIDA OF ADVANCED MATERNAL AGE: ICD-10-CM

## 2021-09-27 RX ORDER — ASPIRIN 81 MG/1
162 TABLET, CHEWABLE ORAL DAILY
Qty: 60 TABLET | Refills: 0 | OUTPATIENT
Start: 2021-09-27

## 2021-09-27 NOTE — TELEPHONE ENCOUNTER
Nursing I always give enough refills to get through the pregnancy  Please call this patient and let her know she just needs to go to the pharmacy and they have her refills    Jeane Jordan MD

## 2021-09-27 NOTE — PROGRESS NOTES
Date: 09/27/21  Alexy Mackey  1985  Estimated Date of Delivery: 11/27/21  31w2d  OB/GYN: Radha Leos  Diagnosis: GDM, insulin controlled, third trimester    -History of GDM 2014,       Plan:  Increase Lantus from 24 units up to 26 units at 10-11:00 PM daily  No- show needs to reschedule follow-up with PEYTON Esparza from 09/09/2021   -several post meal elevations - patient has admitted to not adhering to diet    -missing after breakfast readings  Diet: 1800 Gestational diabetes meal plan; 3 meals and 3 snacks    -bedtime snack of 1 serving CHO and 2-3 ounces of protein  SMBG: Checks blood sugar 4 times per day; fasting and 2 hour start of each meal  Requested patient check 3 am blood sugar with the start of insulin therapy  Meter: One Touch verio glucose meter  Activity: Walks 30 minutes daily, follow OB recommendations  Support System: Significant Other  Patient Goal: Patient will eat 3 meals and 3 snacks daily including the correct combination of carbohydrates, protein and fat       Labs  08/05/2021 A1c- 5 4%  08/05/2021 CMP completed  09/28/2021 Labs ordered - needs to complete     Ultrasounds  07/13/2021  Normal growth and SVETLANA   09/07/2021 Normal growth and SVETLANA - EFW 59%  Next US scheduled for 10/05/2021    Further fetal surveillance  NST / SVETLANA twice a week    Janna De Los Santos RN

## 2021-09-28 ENCOUNTER — TELEPHONE (OUTPATIENT)
Dept: PERINATAL CARE | Facility: CLINIC | Age: 36
End: 2021-09-28

## 2021-09-28 NOTE — TELEPHONE ENCOUNTER
Spoke to pt to inquire on needing aspirin medication refill request she sent in My Chart  Pt states she requested this medication by accident, and does not need refills  PT declines further needs at this time

## 2021-10-05 ENCOUNTER — TELEPHONE (OUTPATIENT)
Dept: PERINATAL CARE | Facility: CLINIC | Age: 36
End: 2021-10-05

## 2021-10-05 ENCOUNTER — DOCUMENTATION (OUTPATIENT)
Dept: PERINATAL CARE | Facility: CLINIC | Age: 36
End: 2021-10-05

## 2021-10-06 ENCOUNTER — ROUTINE PRENATAL (OUTPATIENT)
Dept: OBGYN CLINIC | Facility: CLINIC | Age: 36
End: 2021-10-06
Payer: COMMERCIAL

## 2021-10-06 ENCOUNTER — APPOINTMENT (OUTPATIENT)
Dept: LAB | Facility: HOSPITAL | Age: 36
End: 2021-10-06
Attending: STUDENT IN AN ORGANIZED HEALTH CARE EDUCATION/TRAINING PROGRAM
Payer: COMMERCIAL

## 2021-10-06 VITALS — SYSTOLIC BLOOD PRESSURE: 120 MMHG | BODY MASS INDEX: 33.98 KG/M2 | WEIGHT: 174 LBS | DIASTOLIC BLOOD PRESSURE: 82 MMHG

## 2021-10-06 DIAGNOSIS — Z34.82 ENCOUNTER FOR SUPERVISION OF OTHER NORMAL PREGNANCY IN SECOND TRIMESTER: ICD-10-CM

## 2021-10-06 DIAGNOSIS — Z28.21 COVID-19 VACCINATION DECLINED: ICD-10-CM

## 2021-10-06 DIAGNOSIS — O24.414 INSULIN CONTROLLED GESTATIONAL DIABETES MELLITUS (GDM) IN THIRD TRIMESTER: ICD-10-CM

## 2021-10-06 DIAGNOSIS — O24.414 INSULIN CONTROLLED GESTATIONAL DIABETES MELLITUS (GDM) IN THIRD TRIMESTER: Primary | ICD-10-CM

## 2021-10-06 DIAGNOSIS — Z23 NEED FOR TDAP VACCINATION: ICD-10-CM

## 2021-10-06 DIAGNOSIS — O99.213 OBESITY AFFECTING PREGNANCY IN THIRD TRIMESTER: ICD-10-CM

## 2021-10-06 DIAGNOSIS — O09.529 ANTEPARTUM MULTIGRAVIDA OF ADVANCED MATERNAL AGE: ICD-10-CM

## 2021-10-06 DIAGNOSIS — Z34.83 PRENATAL CARE, SUBSEQUENT PREGNANCY, THIRD TRIMESTER: Primary | ICD-10-CM

## 2021-10-06 PROBLEM — Z34.03 ENCOUNTER FOR SUPERVISION OF NORMAL FIRST PREGNANCY IN THIRD TRIMESTER: Status: RESOLVED | Noted: 2021-07-20 | Resolved: 2021-10-06

## 2021-10-06 LAB
ALBUMIN SERPL BCP-MCNC: 2.2 G/DL (ref 3.5–5)
ALP SERPL-CCNC: 128 U/L (ref 46–116)
ALT SERPL W P-5'-P-CCNC: 52 U/L (ref 12–78)
ANION GAP SERPL CALCULATED.3IONS-SCNC: 9 MMOL/L (ref 4–13)
AST SERPL W P-5'-P-CCNC: 19 U/L (ref 5–45)
BASOPHILS # BLD AUTO: 0.02 THOUSANDS/ΜL (ref 0–0.1)
BASOPHILS NFR BLD AUTO: 0 % (ref 0–1)
BILIRUB SERPL-MCNC: 0.33 MG/DL (ref 0.2–1)
BUN SERPL-MCNC: 9 MG/DL (ref 5–25)
CALCIUM ALBUM COR SERPL-MCNC: 10.2 MG/DL (ref 8.3–10.1)
CALCIUM SERPL-MCNC: 8.8 MG/DL (ref 8.3–10.1)
CHLORIDE SERPL-SCNC: 106 MMOL/L (ref 100–108)
CO2 SERPL-SCNC: 25 MMOL/L (ref 21–32)
CREAT SERPL-MCNC: 0.62 MG/DL (ref 0.6–1.3)
EOSINOPHIL # BLD AUTO: 0.09 THOUSAND/ΜL (ref 0–0.61)
EOSINOPHIL NFR BLD AUTO: 1 % (ref 0–6)
ERYTHROCYTE [DISTWIDTH] IN BLOOD BY AUTOMATED COUNT: 14.4 % (ref 11.6–15.1)
EST. AVERAGE GLUCOSE BLD GHB EST-MCNC: 123 MG/DL
GFR SERPL CREATININE-BSD FRML MDRD: 117 ML/MIN/1.73SQ M
GLUCOSE SERPL-MCNC: 119 MG/DL (ref 65–140)
HBA1C MFR BLD: 5.9 %
HCT VFR BLD AUTO: 34.2 % (ref 34.8–46.1)
HGB BLD-MCNC: 11.3 G/DL (ref 11.5–15.4)
IMM GRANULOCYTES # BLD AUTO: 0.11 THOUSAND/UL (ref 0–0.2)
IMM GRANULOCYTES NFR BLD AUTO: 1 % (ref 0–2)
LYMPHOCYTES # BLD AUTO: 2.7 THOUSANDS/ΜL (ref 0.6–4.47)
LYMPHOCYTES NFR BLD AUTO: 25 % (ref 14–44)
MCH RBC QN AUTO: 28.7 PG (ref 26.8–34.3)
MCHC RBC AUTO-ENTMCNC: 33 G/DL (ref 31.4–37.4)
MCV RBC AUTO: 87 FL (ref 82–98)
MONOCYTES # BLD AUTO: 0.71 THOUSAND/ΜL (ref 0.17–1.22)
MONOCYTES NFR BLD AUTO: 7 % (ref 4–12)
NEUTROPHILS # BLD AUTO: 7.33 THOUSANDS/ΜL (ref 1.85–7.62)
NEUTS SEG NFR BLD AUTO: 66 % (ref 43–75)
NRBC BLD AUTO-RTO: 0 /100 WBCS
PLATELET # BLD AUTO: 319 THOUSANDS/UL (ref 149–390)
PMV BLD AUTO: 9.4 FL (ref 8.9–12.7)
POTASSIUM SERPL-SCNC: 4 MMOL/L (ref 3.5–5.3)
PROT SERPL-MCNC: 6.7 G/DL (ref 6.4–8.2)
RBC # BLD AUTO: 3.94 MILLION/UL (ref 3.81–5.12)
SL AMB  POCT GLUCOSE, UA: NORMAL
SL AMB POCT URINE PROTEIN: NORMAL
SODIUM SERPL-SCNC: 140 MMOL/L (ref 136–145)
WBC # BLD AUTO: 10.96 THOUSAND/UL (ref 4.31–10.16)

## 2021-10-06 PROCEDURE — 80053 COMPREHEN METABOLIC PANEL: CPT

## 2021-10-06 PROCEDURE — 86592 SYPHILIS TEST NON-TREP QUAL: CPT

## 2021-10-06 PROCEDURE — 90471 IMMUNIZATION ADMIN: CPT

## 2021-10-06 PROCEDURE — 83036 HEMOGLOBIN GLYCOSYLATED A1C: CPT

## 2021-10-06 PROCEDURE — 36415 COLL VENOUS BLD VENIPUNCTURE: CPT

## 2021-10-06 PROCEDURE — 99213 OFFICE O/P EST LOW 20 MIN: CPT | Performed by: NURSE PRACTITIONER

## 2021-10-06 PROCEDURE — 90715 TDAP VACCINE 7 YRS/> IM: CPT

## 2021-10-06 PROCEDURE — 85025 COMPLETE CBC W/AUTO DIFF WBC: CPT

## 2021-10-07 ENCOUNTER — ROUTINE PRENATAL (OUTPATIENT)
Dept: PERINATAL CARE | Facility: OTHER | Age: 36
End: 2021-10-07
Payer: COMMERCIAL

## 2021-10-07 ENCOUNTER — ULTRASOUND (OUTPATIENT)
Dept: PERINATAL CARE | Facility: OTHER | Age: 36
End: 2021-10-07
Payer: COMMERCIAL

## 2021-10-07 VITALS
SYSTOLIC BLOOD PRESSURE: 121 MMHG | HEART RATE: 96 BPM | WEIGHT: 177.6 LBS | BODY MASS INDEX: 34.87 KG/M2 | DIASTOLIC BLOOD PRESSURE: 85 MMHG | HEIGHT: 60 IN

## 2021-10-07 DIAGNOSIS — Z3A.32 32 WEEKS GESTATION OF PREGNANCY: ICD-10-CM

## 2021-10-07 DIAGNOSIS — O24.414 INSULIN CONTROLLED GESTATIONAL DIABETES MELLITUS (GDM) IN THIRD TRIMESTER: Primary | ICD-10-CM

## 2021-10-07 LAB — RPR SER QL: NORMAL

## 2021-10-07 PROCEDURE — 76816 OB US FOLLOW-UP PER FETUS: CPT | Performed by: OBSTETRICS & GYNECOLOGY

## 2021-10-07 PROCEDURE — 59025 FETAL NON-STRESS TEST: CPT | Performed by: OBSTETRICS & GYNECOLOGY

## 2021-10-07 PROCEDURE — 99213 OFFICE O/P EST LOW 20 MIN: CPT | Performed by: OBSTETRICS & GYNECOLOGY

## 2021-10-07 PROCEDURE — NC001 PR NO CHARGE

## 2021-10-08 ENCOUNTER — TELEMEDICINE (OUTPATIENT)
Dept: PERINATAL CARE | Facility: CLINIC | Age: 36
End: 2021-10-08
Payer: COMMERCIAL

## 2021-10-08 DIAGNOSIS — O24.414 INSULIN CONTROLLED GESTATIONAL DIABETES MELLITUS (GDM) IN SECOND TRIMESTER: Primary | ICD-10-CM

## 2021-10-08 DIAGNOSIS — Z3A.32 32 WEEKS GESTATION OF PREGNANCY: ICD-10-CM

## 2021-10-08 PROCEDURE — G0108 DIAB MANAGE TRN  PER INDIV: HCPCS | Performed by: DIETITIAN, REGISTERED

## 2021-10-11 ENCOUNTER — TELEPHONE (OUTPATIENT)
Dept: PERINATAL CARE | Facility: CLINIC | Age: 36
End: 2021-10-11

## 2021-10-12 ENCOUNTER — ULTRASOUND (OUTPATIENT)
Dept: PERINATAL CARE | Facility: OTHER | Age: 36
End: 2021-10-12
Payer: COMMERCIAL

## 2021-10-12 ENCOUNTER — TELEPHONE (OUTPATIENT)
Dept: PERINATAL CARE | Facility: CLINIC | Age: 36
End: 2021-10-12

## 2021-10-12 VITALS
WEIGHT: 178 LBS | SYSTOLIC BLOOD PRESSURE: 116 MMHG | BODY MASS INDEX: 34.95 KG/M2 | HEIGHT: 60 IN | DIASTOLIC BLOOD PRESSURE: 63 MMHG | HEART RATE: 88 BPM

## 2021-10-12 DIAGNOSIS — O24.414 INSULIN CONTROLLED GESTATIONAL DIABETES MELLITUS (GDM) IN THIRD TRIMESTER: Primary | ICD-10-CM

## 2021-10-12 DIAGNOSIS — Z3A.33 33 WEEKS GESTATION OF PREGNANCY: ICD-10-CM

## 2021-10-12 PROCEDURE — 59025 FETAL NON-STRESS TEST: CPT | Performed by: OBSTETRICS & GYNECOLOGY

## 2021-10-12 PROCEDURE — 76815 OB US LIMITED FETUS(S): CPT | Performed by: OBSTETRICS & GYNECOLOGY

## 2021-10-14 DIAGNOSIS — O99.810 HYPERGLYCEMIA IN PREGNANCY: Primary | ICD-10-CM

## 2021-10-14 RX ORDER — INSULIN ASPART 100 [IU]/ML
4 INJECTION, SOLUTION INTRAVENOUS; SUBCUTANEOUS
Qty: 15 ML | Refills: 0 | Status: SHIPPED | OUTPATIENT
Start: 2021-10-14 | End: 2021-10-31 | Stop reason: HOSPADM

## 2021-10-15 ENCOUNTER — NURSE TRIAGE (OUTPATIENT)
Dept: OTHER | Facility: OTHER | Age: 36
End: 2021-10-15

## 2021-10-15 ENCOUNTER — TELEPHONE (OUTPATIENT)
Dept: OBGYN CLINIC | Facility: CLINIC | Age: 36
End: 2021-10-15

## 2021-10-15 ENCOUNTER — ROUTINE PRENATAL (OUTPATIENT)
Dept: PERINATAL CARE | Facility: OTHER | Age: 36
End: 2021-10-15
Payer: COMMERCIAL

## 2021-10-15 ENCOUNTER — DOCUMENTATION (OUTPATIENT)
Dept: PERINATAL CARE | Facility: CLINIC | Age: 36
End: 2021-10-15

## 2021-10-15 ENCOUNTER — HOSPITAL ENCOUNTER (OUTPATIENT)
Facility: HOSPITAL | Age: 36
Discharge: HOME | End: 2021-10-16
Attending: STUDENT IN AN ORGANIZED HEALTH CARE EDUCATION/TRAINING PROGRAM | Admitting: STUDENT IN AN ORGANIZED HEALTH CARE EDUCATION/TRAINING PROGRAM
Payer: COMMERCIAL

## 2021-10-15 VITALS
HEART RATE: 96 BPM | DIASTOLIC BLOOD PRESSURE: 87 MMHG | WEIGHT: 179.4 LBS | SYSTOLIC BLOOD PRESSURE: 115 MMHG | HEIGHT: 60 IN | BODY MASS INDEX: 35.22 KG/M2

## 2021-10-15 VITALS
DIASTOLIC BLOOD PRESSURE: 76 MMHG | SYSTOLIC BLOOD PRESSURE: 130 MMHG | RESPIRATION RATE: 18 BRPM | HEART RATE: 89 BPM | TEMPERATURE: 97.6 F

## 2021-10-15 DIAGNOSIS — Z33.1 PREGNANT STATE, INCIDENTAL: Primary | ICD-10-CM

## 2021-10-15 DIAGNOSIS — Z3A.33 33 WEEKS GESTATION OF PREGNANCY: ICD-10-CM

## 2021-10-15 DIAGNOSIS — O24.414 INSULIN CONTROLLED GESTATIONAL DIABETES MELLITUS (GDM) IN THIRD TRIMESTER: ICD-10-CM

## 2021-10-15 PROCEDURE — NC001 PR NO CHARGE: Performed by: STUDENT IN AN ORGANIZED HEALTH CARE EDUCATION/TRAINING PROGRAM

## 2021-10-15 PROCEDURE — 76818 FETAL BIOPHYS PROFILE W/NST: CPT | Performed by: OBSTETRICS & GYNECOLOGY

## 2021-10-15 PROCEDURE — NC001 PR NO CHARGE

## 2021-10-16 PROCEDURE — 99214 OFFICE O/P EST MOD 30 MIN: CPT

## 2021-10-16 PROCEDURE — 76815 OB US LIMITED FETUS(S): CPT | Performed by: STUDENT IN AN ORGANIZED HEALTH CARE EDUCATION/TRAINING PROGRAM

## 2021-10-16 PROCEDURE — NC001 PR NO CHARGE: Performed by: STUDENT IN AN ORGANIZED HEALTH CARE EDUCATION/TRAINING PROGRAM

## 2021-10-19 ENCOUNTER — TELEPHONE (OUTPATIENT)
Dept: OBGYN CLINIC | Facility: CLINIC | Age: 36
End: 2021-10-19

## 2021-10-19 ENCOUNTER — ROUTINE PRENATAL (OUTPATIENT)
Dept: OBGYN CLINIC | Facility: CLINIC | Age: 36
End: 2021-10-19
Payer: COMMERCIAL

## 2021-10-19 ENCOUNTER — ULTRASOUND (OUTPATIENT)
Dept: PERINATAL CARE | Facility: OTHER | Age: 36
End: 2021-10-19
Payer: COMMERCIAL

## 2021-10-19 VITALS
HEIGHT: 60 IN | SYSTOLIC BLOOD PRESSURE: 119 MMHG | BODY MASS INDEX: 34.87 KG/M2 | HEART RATE: 81 BPM | DIASTOLIC BLOOD PRESSURE: 73 MMHG | WEIGHT: 177.6 LBS

## 2021-10-19 VITALS — WEIGHT: 178 LBS | BODY MASS INDEX: 34.76 KG/M2 | DIASTOLIC BLOOD PRESSURE: 82 MMHG | SYSTOLIC BLOOD PRESSURE: 124 MMHG

## 2021-10-19 DIAGNOSIS — O24.414 INSULIN CONTROLLED GESTATIONAL DIABETES MELLITUS (GDM) IN THIRD TRIMESTER: ICD-10-CM

## 2021-10-19 DIAGNOSIS — Z34.83 PRENATAL CARE, SUBSEQUENT PREGNANCY, THIRD TRIMESTER: Primary | ICD-10-CM

## 2021-10-19 DIAGNOSIS — O99.213 OBESITY AFFECTING PREGNANCY IN THIRD TRIMESTER: ICD-10-CM

## 2021-10-19 DIAGNOSIS — O34.211 MATERNAL CARE DUE TO LOW TRANSVERSE UTERINE SCAR FROM PREVIOUS CESAREAN DELIVERY: ICD-10-CM

## 2021-10-19 DIAGNOSIS — O09.529 ANTEPARTUM MULTIGRAVIDA OF ADVANCED MATERNAL AGE: ICD-10-CM

## 2021-10-19 DIAGNOSIS — Z3A.34 34 WEEKS GESTATION OF PREGNANCY: Primary | ICD-10-CM

## 2021-10-19 LAB
SL AMB  POCT GLUCOSE, UA: NORMAL
SL AMB POCT URINE PROTEIN: NORMAL

## 2021-10-19 PROCEDURE — 59025 FETAL NON-STRESS TEST: CPT | Performed by: OBSTETRICS & GYNECOLOGY

## 2021-10-19 PROCEDURE — 99213 OFFICE O/P EST LOW 20 MIN: CPT | Performed by: STUDENT IN AN ORGANIZED HEALTH CARE EDUCATION/TRAINING PROGRAM

## 2021-10-19 PROCEDURE — 76815 OB US LIMITED FETUS(S): CPT | Performed by: OBSTETRICS & GYNECOLOGY

## 2021-10-20 ENCOUNTER — TELEPHONE (OUTPATIENT)
Dept: OBGYN CLINIC | Facility: CLINIC | Age: 36
End: 2021-10-20

## 2021-10-22 ENCOUNTER — ROUTINE PRENATAL (OUTPATIENT)
Dept: PERINATAL CARE | Facility: OTHER | Age: 36
End: 2021-10-22
Payer: COMMERCIAL

## 2021-10-22 VITALS
HEART RATE: 92 BPM | WEIGHT: 176 LBS | BODY MASS INDEX: 34.55 KG/M2 | DIASTOLIC BLOOD PRESSURE: 79 MMHG | HEIGHT: 60 IN | SYSTOLIC BLOOD PRESSURE: 123 MMHG

## 2021-10-22 DIAGNOSIS — O24.414 INSULIN CONTROLLED GESTATIONAL DIABETES MELLITUS (GDM) IN THIRD TRIMESTER: ICD-10-CM

## 2021-10-22 DIAGNOSIS — Z3A.34 34 WEEKS GESTATION OF PREGNANCY: Primary | ICD-10-CM

## 2021-10-22 DIAGNOSIS — O99.213 OBESITY AFFECTING PREGNANCY IN THIRD TRIMESTER: ICD-10-CM

## 2021-10-22 DIAGNOSIS — O09.529 ANTEPARTUM MULTIGRAVIDA OF ADVANCED MATERNAL AGE: ICD-10-CM

## 2021-10-22 PROCEDURE — 59025 FETAL NON-STRESS TEST: CPT | Performed by: OBSTETRICS & GYNECOLOGY

## 2021-10-25 ENCOUNTER — DOCUMENTATION (OUTPATIENT)
Dept: PERINATAL CARE | Facility: CLINIC | Age: 36
End: 2021-10-25

## 2021-10-26 ENCOUNTER — HOSPITAL ENCOUNTER (INPATIENT)
Facility: HOSPITAL | Age: 36
LOS: 3 days | Discharge: HOME | DRG: 540 | End: 2021-10-31
Attending: OBSTETRICS & GYNECOLOGY | Admitting: STUDENT IN AN ORGANIZED HEALTH CARE EDUCATION/TRAINING PROGRAM
Payer: COMMERCIAL

## 2021-10-26 ENCOUNTER — DOCUMENTATION (OUTPATIENT)
Dept: PERINATAL CARE | Facility: CLINIC | Age: 36
End: 2021-10-26

## 2021-10-26 ENCOUNTER — ULTRASOUND (OUTPATIENT)
Dept: PERINATAL CARE | Facility: OTHER | Age: 36
End: 2021-10-26
Payer: COMMERCIAL

## 2021-10-26 VITALS
HEART RATE: 91 BPM | SYSTOLIC BLOOD PRESSURE: 113 MMHG | HEIGHT: 60 IN | BODY MASS INDEX: 34.99 KG/M2 | WEIGHT: 178.2 LBS | DIASTOLIC BLOOD PRESSURE: 77 MMHG

## 2021-10-26 DIAGNOSIS — O24.414 INSULIN CONTROLLED GESTATIONAL DIABETES MELLITUS (GDM) IN THIRD TRIMESTER: Primary | ICD-10-CM

## 2021-10-26 DIAGNOSIS — O34.219 PREVIOUS CESAREAN SECTION COMPLICATING PREGNANCY: ICD-10-CM

## 2021-10-26 DIAGNOSIS — O41.03X0 OLIGOHYDRAMNIOS IN THIRD TRIMESTER, SINGLE OR UNSPECIFIED FETUS: ICD-10-CM

## 2021-10-26 DIAGNOSIS — Z98.891 STATUS POST REPEAT LOW TRANSVERSE CESAREAN SECTION: Primary | ICD-10-CM

## 2021-10-26 DIAGNOSIS — O41.03X0 OLIGOHYDRAMNIOS, THIRD TRIMESTER, NOT APPLICABLE OR UNSPECIFIED FETUS: ICD-10-CM

## 2021-10-26 PROBLEM — Z3A.35 35 WEEKS GESTATION OF PREGNANCY: Status: ACTIVE | Noted: 2021-10-15

## 2021-10-26 LAB
A1 MICROGLOB PLACENTAL VAG QL: NEGATIVE
ABO GROUP BLD: NORMAL
ALBUMIN SERPL BCP-MCNC: 2.3 G/DL (ref 3.5–5)
ALP SERPL-CCNC: 154 U/L (ref 46–116)
ALT SERPL W P-5'-P-CCNC: 106 U/L (ref 12–78)
ANION GAP SERPL CALCULATED.3IONS-SCNC: 13 MMOL/L (ref 4–13)
AST SERPL W P-5'-P-CCNC: 33 U/L (ref 5–45)
BASOPHILS # BLD AUTO: 0.04 THOUSANDS/ΜL (ref 0–0.1)
BASOPHILS NFR BLD AUTO: 0 % (ref 0–1)
BILIRUB SERPL-MCNC: 0.47 MG/DL (ref 0.2–1)
BLD GP AB SCN SERPL QL: NEGATIVE
BUN SERPL-MCNC: 10 MG/DL (ref 5–25)
CALCIUM ALBUM COR SERPL-MCNC: 10.5 MG/DL (ref 8.3–10.1)
CALCIUM SERPL-MCNC: 9.1 MG/DL (ref 8.3–10.1)
CHLORIDE SERPL-SCNC: 106 MMOL/L (ref 100–108)
CO2 SERPL-SCNC: 19 MMOL/L (ref 21–32)
CREAT SERPL-MCNC: 0.73 MG/DL (ref 0.6–1.3)
EOSINOPHIL # BLD AUTO: 0.09 THOUSAND/ΜL (ref 0–0.61)
EOSINOPHIL NFR BLD AUTO: 1 % (ref 0–6)
ERYTHROCYTE [DISTWIDTH] IN BLOOD BY AUTOMATED COUNT: 14.9 % (ref 11.6–15.1)
EXTERNAL GROUP B STREP ANTIGEN: NORMAL
GFR SERPL CREATININE-BSD FRML MDRD: 107 ML/MIN/1.73SQ M
GLUCOSE SERPL-MCNC: 190 MG/DL (ref 65–140)
GLUCOSE SERPL-MCNC: 80 MG/DL (ref 65–140)
HCT VFR BLD AUTO: 35.6 % (ref 34.8–46.1)
HGB BLD-MCNC: 11.9 G/DL (ref 11.5–15.4)
IMM GRANULOCYTES # BLD AUTO: 0.11 THOUSAND/UL (ref 0–0.2)
IMM GRANULOCYTES NFR BLD AUTO: 1 % (ref 0–2)
LYMPHOCYTES # BLD AUTO: 3.09 THOUSANDS/ΜL (ref 0.6–4.47)
LYMPHOCYTES NFR BLD AUTO: 26 % (ref 14–44)
MCH RBC QN AUTO: 29 PG (ref 26.8–34.3)
MCHC RBC AUTO-ENTMCNC: 33.4 G/DL (ref 31.4–37.4)
MCV RBC AUTO: 87 FL (ref 82–98)
MONOCYTES # BLD AUTO: 0.83 THOUSAND/ΜL (ref 0.17–1.22)
MONOCYTES NFR BLD AUTO: 7 % (ref 4–12)
NEUTROPHILS # BLD AUTO: 7.59 THOUSANDS/ΜL (ref 1.85–7.62)
NEUTS SEG NFR BLD AUTO: 65 % (ref 43–75)
NRBC BLD AUTO-RTO: 0 /100 WBCS
PLATELET # BLD AUTO: 329 THOUSANDS/UL (ref 149–390)
PMV BLD AUTO: 9.4 FL (ref 8.9–12.7)
POTASSIUM SERPL-SCNC: 3.9 MMOL/L (ref 3.5–5.3)
PROT SERPL-MCNC: 6.9 G/DL (ref 6.4–8.2)
RBC # BLD AUTO: 4.11 MILLION/UL (ref 3.81–5.12)
RH BLD: POSITIVE
SODIUM SERPL-SCNC: 138 MMOL/L (ref 136–145)
SPECIMEN EXPIRATION DATE: NORMAL
WBC # BLD AUTO: 11.75 THOUSAND/UL (ref 4.31–10.16)

## 2021-10-26 PROCEDURE — 99218 PR INITIAL OBSERVATION CARE/DAY 30 MINUTES: CPT | Performed by: OBSTETRICS & GYNECOLOGY

## 2021-10-26 PROCEDURE — 76815 OB US LIMITED FETUS(S): CPT | Performed by: OBSTETRICS & GYNECOLOGY

## 2021-10-26 PROCEDURE — 86900 BLOOD TYPING SEROLOGIC ABO: CPT

## 2021-10-26 PROCEDURE — G0379 DIRECT REFER HOSPITAL OBSERV: HCPCS

## 2021-10-26 PROCEDURE — 85025 COMPLETE CBC W/AUTO DIFF WBC: CPT

## 2021-10-26 PROCEDURE — 59025 FETAL NON-STRESS TEST: CPT | Performed by: OBSTETRICS & GYNECOLOGY

## 2021-10-26 PROCEDURE — 86901 BLOOD TYPING SEROLOGIC RH(D): CPT

## 2021-10-26 PROCEDURE — 80053 COMPREHEN METABOLIC PANEL: CPT

## 2021-10-26 PROCEDURE — 86920 COMPATIBILITY TEST SPIN: CPT

## 2021-10-26 PROCEDURE — 99214 OFFICE O/P EST MOD 30 MIN: CPT | Performed by: OBSTETRICS & GYNECOLOGY

## 2021-10-26 PROCEDURE — 82948 REAGENT STRIP/BLOOD GLUCOSE: CPT

## 2021-10-26 PROCEDURE — 84112 EVAL AMNIOTIC FLUID PROTEIN: CPT

## 2021-10-26 PROCEDURE — 86592 SYPHILIS TEST NON-TREP QUAL: CPT

## 2021-10-26 PROCEDURE — 86850 RBC ANTIBODY SCREEN: CPT

## 2021-10-26 PROCEDURE — 99214 OFFICE O/P EST MOD 30 MIN: CPT

## 2021-10-26 PROCEDURE — 87150 DNA/RNA AMPLIFIED PROBE: CPT

## 2021-10-26 RX ORDER — BETAMETHASONE SODIUM PHOSPHATE AND BETAMETHASONE ACETATE 3; 3 MG/ML; MG/ML
12 INJECTION, SUSPENSION INTRA-ARTICULAR; INTRALESIONAL; INTRAMUSCULAR; SOFT TISSUE EVERY 24 HOURS
Status: COMPLETED | OUTPATIENT
Start: 2021-10-26 | End: 2021-10-27

## 2021-10-26 RX ORDER — ONDANSETRON 2 MG/ML
4 INJECTION INTRAMUSCULAR; INTRAVENOUS EVERY 8 HOURS PRN
Status: DISCONTINUED | OUTPATIENT
Start: 2021-10-26 | End: 2021-10-28

## 2021-10-26 RX ORDER — DOCUSATE SODIUM 100 MG/1
100 CAPSULE, LIQUID FILLED ORAL 2 TIMES DAILY PRN
Status: DISCONTINUED | OUTPATIENT
Start: 2021-10-26 | End: 2021-10-28

## 2021-10-26 RX ORDER — INSULIN GLARGINE 100 [IU]/ML
50 INJECTION, SOLUTION SUBCUTANEOUS
Status: DISCONTINUED | OUTPATIENT
Start: 2021-10-26 | End: 2021-10-27

## 2021-10-26 RX ORDER — CALCIUM CARBONATE 200(500)MG
1000 TABLET,CHEWABLE ORAL DAILY PRN
Status: DISCONTINUED | OUTPATIENT
Start: 2021-10-26 | End: 2021-10-31 | Stop reason: HOSPADM

## 2021-10-26 RX ORDER — INSULIN GLARGINE 100 [IU]/ML
50 INJECTION, SOLUTION SUBCUTANEOUS
Status: DISCONTINUED | OUTPATIENT
Start: 2021-10-26 | End: 2021-10-26 | Stop reason: SDUPTHER

## 2021-10-26 RX ORDER — SODIUM CHLORIDE 9 MG/ML
125 INJECTION, SOLUTION INTRAVENOUS CONTINUOUS
Status: DISCONTINUED | OUTPATIENT
Start: 2021-10-26 | End: 2021-10-31 | Stop reason: HOSPADM

## 2021-10-26 RX ORDER — ACETAMINOPHEN 325 MG/1
650 TABLET ORAL EVERY 4 HOURS PRN
Status: DISCONTINUED | OUTPATIENT
Start: 2021-10-26 | End: 2021-10-28

## 2021-10-26 RX ADMIN — AZITHROMYCIN MONOHYDRATE 1000 MG: 500 INJECTION, POWDER, LYOPHILIZED, FOR SOLUTION INTRAVENOUS at 19:13

## 2021-10-26 RX ADMIN — SODIUM CHLORIDE 125 ML/HR: 0.9 INJECTION, SOLUTION INTRAVENOUS at 16:30

## 2021-10-26 RX ADMIN — INSULIN LISPRO 4 UNITS: 100 INJECTION, SOLUTION INTRAVENOUS; SUBCUTANEOUS at 18:01

## 2021-10-26 RX ADMIN — BETAMETHASONE SODIUM PHOSPHATE AND BETAMETHASONE ACETATE 12 MG: 3; 3 INJECTION, SUSPENSION INTRA-ARTICULAR; INTRALESIONAL; INTRAMUSCULAR at 17:14

## 2021-10-26 RX ADMIN — AMPICILLIN SODIUM 2000 MG: 2 INJECTION, POWDER, FOR SOLUTION INTRAMUSCULAR; INTRAVENOUS at 18:09

## 2021-10-26 RX ADMIN — INSULIN GLARGINE 50 UNITS: 100 INJECTION, SOLUTION SUBCUTANEOUS at 22:47

## 2021-10-27 LAB
GLUCOSE SERPL-MCNC: 125 MG/DL (ref 65–140)
GLUCOSE SERPL-MCNC: 194 MG/DL (ref 65–140)
GLUCOSE SERPL-MCNC: 199 MG/DL (ref 65–140)
GLUCOSE SERPL-MCNC: 94 MG/DL (ref 65–140)
RPR SER QL: NORMAL

## 2021-10-27 PROCEDURE — 99232 SBSQ HOSP IP/OBS MODERATE 35: CPT | Performed by: OBSTETRICS & GYNECOLOGY

## 2021-10-27 PROCEDURE — 82948 REAGENT STRIP/BLOOD GLUCOSE: CPT

## 2021-10-27 PROCEDURE — 99224 PR SBSQ OBSERVATION CARE/DAY 15 MINUTES: CPT | Performed by: OBSTETRICS & GYNECOLOGY

## 2021-10-27 RX ORDER — DEXTROSE AND SODIUM CHLORIDE 5; .9 G/100ML; G/100ML
100 INJECTION, SOLUTION INTRAVENOUS CONTINUOUS
Status: DISCONTINUED | OUTPATIENT
Start: 2021-10-27 | End: 2021-10-28

## 2021-10-27 RX ORDER — INSULIN GLARGINE 100 [IU]/ML
25 INJECTION, SOLUTION SUBCUTANEOUS
Status: DISCONTINUED | OUTPATIENT
Start: 2021-10-28 | End: 2021-10-28

## 2021-10-27 RX ORDER — INSULIN GLARGINE 100 [IU]/ML
25 INJECTION, SOLUTION SUBCUTANEOUS
Status: DISCONTINUED | OUTPATIENT
Start: 2021-10-28 | End: 2021-10-27

## 2021-10-27 RX ORDER — CEFAZOLIN SODIUM 2 G/50ML
2000 SOLUTION INTRAVENOUS ONCE
Status: COMPLETED | OUTPATIENT
Start: 2021-10-28 | End: 2021-10-28

## 2021-10-27 RX ADMIN — AMPICILLIN SODIUM 2000 MG: 2 INJECTION, POWDER, FOR SOLUTION INTRAMUSCULAR; INTRAVENOUS at 11:40

## 2021-10-27 RX ADMIN — AMPICILLIN SODIUM 2000 MG: 2 INJECTION, POWDER, FOR SOLUTION INTRAMUSCULAR; INTRAVENOUS at 00:49

## 2021-10-27 RX ADMIN — SODIUM CHLORIDE 125 ML/HR: 0.9 INJECTION, SOLUTION INTRAVENOUS at 21:09

## 2021-10-27 RX ADMIN — INSULIN LISPRO 4 UNITS: 100 INJECTION, SOLUTION INTRAVENOUS; SUBCUTANEOUS at 14:11

## 2021-10-27 RX ADMIN — SODIUM CHLORIDE 125 ML/HR: 0.9 INJECTION, SOLUTION INTRAVENOUS at 03:06

## 2021-10-27 RX ADMIN — AMPICILLIN SODIUM 2000 MG: 2 INJECTION, POWDER, FOR SOLUTION INTRAMUSCULAR; INTRAVENOUS at 17:56

## 2021-10-27 RX ADMIN — SODIUM CHLORIDE 125 ML/HR: 0.9 INJECTION, SOLUTION INTRAVENOUS at 11:33

## 2021-10-27 RX ADMIN — AMPICILLIN SODIUM 2000 MG: 2 INJECTION, POWDER, FOR SOLUTION INTRAMUSCULAR; INTRAVENOUS at 06:42

## 2021-10-27 RX ADMIN — INSULIN LISPRO 4 UNITS: 100 INJECTION, SOLUTION INTRAVENOUS; SUBCUTANEOUS at 19:19

## 2021-10-27 RX ADMIN — INSULIN LISPRO 4 UNITS: 100 INJECTION, SOLUTION INTRAVENOUS; SUBCUTANEOUS at 09:46

## 2021-10-27 RX ADMIN — BETAMETHASONE SODIUM PHOSPHATE AND BETAMETHASONE ACETATE 12 MG: 3; 3 INJECTION, SUSPENSION INTRA-ARTICULAR; INTRALESIONAL; INTRAMUSCULAR at 16:43

## 2021-10-28 ENCOUNTER — ANESTHESIA (OUTPATIENT)
Dept: LABOR AND DELIVERY | Facility: HOSPITAL | Age: 36
DRG: 540 | End: 2021-10-28
Payer: COMMERCIAL

## 2021-10-28 PROBLEM — Z98.891 STATUS POST REPEAT LOW TRANSVERSE CESAREAN SECTION: Status: ACTIVE | Noted: 2021-10-28

## 2021-10-28 LAB
ALBUMIN SERPL BCP-MCNC: 2 G/DL (ref 3.5–5)
ALBUMIN SERPL BCP-MCNC: 2.1 G/DL (ref 3.5–5)
ALP SERPL-CCNC: 131 U/L (ref 46–116)
ALP SERPL-CCNC: 137 U/L (ref 46–116)
ALT SERPL W P-5'-P-CCNC: 132 U/L (ref 12–78)
ALT SERPL W P-5'-P-CCNC: 134 U/L (ref 12–78)
ANION GAP SERPL CALCULATED.3IONS-SCNC: 10 MMOL/L (ref 4–13)
ANION GAP SERPL CALCULATED.3IONS-SCNC: 12 MMOL/L (ref 4–13)
AST SERPL W P-5'-P-CCNC: 47 U/L (ref 5–45)
AST SERPL W P-5'-P-CCNC: 63 U/L (ref 5–45)
BASE EXCESS BLDCOA CALC-SCNC: -4.7 MMOL/L (ref 3–11)
BASE EXCESS BLDCOV CALC-SCNC: -6.2 MMOL/L (ref 1–9)
BASOPHILS # BLD AUTO: 0.01 THOUSANDS/ΜL (ref 0–0.1)
BASOPHILS # BLD AUTO: 0.01 THOUSANDS/ΜL (ref 0–0.1)
BASOPHILS NFR BLD AUTO: 0 % (ref 0–1)
BASOPHILS NFR BLD AUTO: 0 % (ref 0–1)
BILIRUB SERPL-MCNC: 0.28 MG/DL (ref 0.2–1)
BILIRUB SERPL-MCNC: 0.39 MG/DL (ref 0.2–1)
BUN SERPL-MCNC: 11 MG/DL (ref 5–25)
BUN SERPL-MCNC: 13 MG/DL (ref 5–25)
CALCIUM ALBUM COR SERPL-MCNC: 10 MG/DL (ref 8.3–10.1)
CALCIUM ALBUM COR SERPL-MCNC: 9.9 MG/DL (ref 8.3–10.1)
CALCIUM SERPL-MCNC: 8.4 MG/DL (ref 8.3–10.1)
CALCIUM SERPL-MCNC: 8.4 MG/DL (ref 8.3–10.1)
CHLORIDE SERPL-SCNC: 107 MMOL/L (ref 100–108)
CHLORIDE SERPL-SCNC: 108 MMOL/L (ref 100–108)
CO2 SERPL-SCNC: 17 MMOL/L (ref 21–32)
CO2 SERPL-SCNC: 18 MMOL/L (ref 21–32)
CREAT SERPL-MCNC: 0.7 MG/DL (ref 0.6–1.3)
CREAT SERPL-MCNC: 0.75 MG/DL (ref 0.6–1.3)
EOSINOPHIL # BLD AUTO: 0 THOUSAND/ΜL (ref 0–0.61)
EOSINOPHIL # BLD AUTO: 0 THOUSAND/ΜL (ref 0–0.61)
EOSINOPHIL NFR BLD AUTO: 0 % (ref 0–6)
EOSINOPHIL NFR BLD AUTO: 0 % (ref 0–6)
ERYTHROCYTE [DISTWIDTH] IN BLOOD BY AUTOMATED COUNT: 14.9 % (ref 11.6–15.1)
ERYTHROCYTE [DISTWIDTH] IN BLOOD BY AUTOMATED COUNT: 15 % (ref 11.6–15.1)
GFR SERPL CREATININE-BSD FRML MDRD: 104 ML/MIN/1.73SQ M
GFR SERPL CREATININE-BSD FRML MDRD: 113 ML/MIN/1.73SQ M
GLUCOSE P FAST SERPL-MCNC: 166 MG/DL (ref 65–99)
GLUCOSE SERPL-MCNC: 140 MG/DL (ref 65–140)
GLUCOSE SERPL-MCNC: 142 MG/DL (ref 65–140)
GLUCOSE SERPL-MCNC: 147 MG/DL (ref 65–140)
GLUCOSE SERPL-MCNC: 150 MG/DL (ref 65–140)
GLUCOSE SERPL-MCNC: 163 MG/DL (ref 65–140)
GLUCOSE SERPL-MCNC: 165 MG/DL (ref 65–140)
GLUCOSE SERPL-MCNC: 165 MG/DL (ref 65–140)
GLUCOSE SERPL-MCNC: 166 MG/DL (ref 65–140)
GLUCOSE SERPL-MCNC: 171 MG/DL (ref 65–140)
GLUCOSE SERPL-MCNC: 182 MG/DL (ref 65–140)
GLUCOSE SERPL-MCNC: 184 MG/DL (ref 65–140)
GLUCOSE SERPL-MCNC: 193 MG/DL (ref 65–140)
GLUCOSE SERPL-MCNC: 207 MG/DL (ref 65–140)
GP B STREP DNA SPEC QL NAA+PROBE: NEGATIVE
HCO3 BLDCOA-SCNC: 24.4 MMOL/L (ref 17.3–27.3)
HCO3 BLDCOV-SCNC: 21.5 MMOL/L (ref 12.2–28.6)
HCT VFR BLD AUTO: 32.2 % (ref 34.8–46.1)
HCT VFR BLD AUTO: 32.4 % (ref 34.8–46.1)
HGB BLD-MCNC: 10.4 G/DL (ref 11.5–15.4)
HGB BLD-MCNC: 10.7 G/DL (ref 11.5–15.4)
IMM GRANULOCYTES # BLD AUTO: 0.15 THOUSAND/UL (ref 0–0.2)
IMM GRANULOCYTES # BLD AUTO: 0.18 THOUSAND/UL (ref 0–0.2)
IMM GRANULOCYTES NFR BLD AUTO: 1 % (ref 0–2)
IMM GRANULOCYTES NFR BLD AUTO: 1 % (ref 0–2)
LYMPHOCYTES # BLD AUTO: 1.74 THOUSANDS/ΜL (ref 0.6–4.47)
LYMPHOCYTES # BLD AUTO: 2.16 THOUSANDS/ΜL (ref 0.6–4.47)
LYMPHOCYTES NFR BLD AUTO: 14 % (ref 14–44)
LYMPHOCYTES NFR BLD AUTO: 17 % (ref 14–44)
MCH RBC QN AUTO: 28.3 PG (ref 26.8–34.3)
MCH RBC QN AUTO: 28.8 PG (ref 26.8–34.3)
MCHC RBC AUTO-ENTMCNC: 32.1 G/DL (ref 31.4–37.4)
MCHC RBC AUTO-ENTMCNC: 33.2 G/DL (ref 31.4–37.4)
MCV RBC AUTO: 87 FL (ref 82–98)
MCV RBC AUTO: 88 FL (ref 82–98)
MONOCYTES # BLD AUTO: 0.47 THOUSAND/ΜL (ref 0.17–1.22)
MONOCYTES # BLD AUTO: 0.76 THOUSAND/ΜL (ref 0.17–1.22)
MONOCYTES NFR BLD AUTO: 4 % (ref 4–12)
MONOCYTES NFR BLD AUTO: 6 % (ref 4–12)
NEUTROPHILS # BLD AUTO: 10.45 THOUSANDS/ΜL (ref 1.85–7.62)
NEUTROPHILS # BLD AUTO: 9.54 THOUSANDS/ΜL (ref 1.85–7.62)
NEUTS SEG NFR BLD AUTO: 76 % (ref 43–75)
NEUTS SEG NFR BLD AUTO: 81 % (ref 43–75)
NRBC BLD AUTO-RTO: 0 /100 WBCS
NRBC BLD AUTO-RTO: 0 /100 WBCS
O2 CT VFR BLDCOA CALC: 6.4 ML/DL
OXYHGB MFR BLDCOA: 30.2 %
OXYHGB MFR BLDCOV: 51.5 %
PCO2 BLDCOA: 61.9 MM[HG] (ref 30–60)
PCO2 BLDCOV: 50.9 MM HG (ref 27–43)
PH BLDCOA: 7.21 [PH] (ref 7.23–7.43)
PH BLDCOV: 7.24 [PH] (ref 7.19–7.49)
PLATELET # BLD AUTO: 333 THOUSANDS/UL (ref 149–390)
PLATELET # BLD AUTO: 334 THOUSANDS/UL (ref 149–390)
PMV BLD AUTO: 9.8 FL (ref 8.9–12.7)
PMV BLD AUTO: 9.9 FL (ref 8.9–12.7)
PO2 BLDCOA: 18 MM HG (ref 5–25)
PO2 BLDCOV: 22.4 MM HG (ref 15–45)
POTASSIUM SERPL-SCNC: 3.8 MMOL/L (ref 3.5–5.3)
POTASSIUM SERPL-SCNC: 4.2 MMOL/L (ref 3.5–5.3)
PROT SERPL-MCNC: 6.2 G/DL (ref 6.4–8.2)
PROT SERPL-MCNC: 6.3 G/DL (ref 6.4–8.2)
RBC # BLD AUTO: 3.68 MILLION/UL (ref 3.81–5.12)
RBC # BLD AUTO: 3.71 MILLION/UL (ref 3.81–5.12)
SAO2 % BLDCOV: 10.7 ML/DL
SODIUM SERPL-SCNC: 135 MMOL/L (ref 136–145)
SODIUM SERPL-SCNC: 137 MMOL/L (ref 136–145)
WBC # BLD AUTO: 12.65 THOUSAND/UL (ref 4.31–10.16)
WBC # BLD AUTO: 12.82 THOUSAND/UL (ref 4.31–10.16)

## 2021-10-28 PROCEDURE — 82948 REAGENT STRIP/BLOOD GLUCOSE: CPT

## 2021-10-28 PROCEDURE — 80053 COMPREHEN METABOLIC PANEL: CPT | Performed by: OBSTETRICS & GYNECOLOGY

## 2021-10-28 PROCEDURE — NC001 PR NO CHARGE: Performed by: STUDENT IN AN ORGANIZED HEALTH CARE EDUCATION/TRAINING PROGRAM

## 2021-10-28 PROCEDURE — 85025 COMPLETE CBC W/AUTO DIFF WBC: CPT | Performed by: STUDENT IN AN ORGANIZED HEALTH CARE EDUCATION/TRAINING PROGRAM

## 2021-10-28 PROCEDURE — 94762 N-INVAS EAR/PLS OXIMTRY CONT: CPT

## 2021-10-28 PROCEDURE — 80053 COMPREHEN METABOLIC PANEL: CPT | Performed by: STUDENT IN AN ORGANIZED HEALTH CARE EDUCATION/TRAINING PROGRAM

## 2021-10-28 PROCEDURE — 94760 N-INVAS EAR/PLS OXIMETRY 1: CPT

## 2021-10-28 PROCEDURE — 99024 POSTOP FOLLOW-UP VISIT: CPT | Performed by: OBSTETRICS & GYNECOLOGY

## 2021-10-28 PROCEDURE — 82805 BLOOD GASES W/O2 SATURATION: CPT | Performed by: STUDENT IN AN ORGANIZED HEALTH CARE EDUCATION/TRAINING PROGRAM

## 2021-10-28 PROCEDURE — 88307 TISSUE EXAM BY PATHOLOGIST: CPT | Performed by: PATHOLOGY

## 2021-10-28 PROCEDURE — 4A1HXCZ MONITORING OF PRODUCTS OF CONCEPTION, CARDIAC RATE, EXTERNAL APPROACH: ICD-10-PCS | Performed by: STUDENT IN AN ORGANIZED HEALTH CARE EDUCATION/TRAINING PROGRAM

## 2021-10-28 PROCEDURE — 30233N1 TRANSFUSION OF NONAUTOLOGOUS RED BLOOD CELLS INTO PERIPHERAL VEIN, PERCUTANEOUS APPROACH: ICD-10-PCS | Performed by: STUDENT IN AN ORGANIZED HEALTH CARE EDUCATION/TRAINING PROGRAM

## 2021-10-28 PROCEDURE — 59514 CESAREAN DELIVERY ONLY: CPT | Performed by: STUDENT IN AN ORGANIZED HEALTH CARE EDUCATION/TRAINING PROGRAM

## 2021-10-28 PROCEDURE — 85025 COMPLETE CBC W/AUTO DIFF WBC: CPT | Performed by: OBSTETRICS & GYNECOLOGY

## 2021-10-28 RX ORDER — SODIUM CHLORIDE 9 MG/ML
INJECTION, SOLUTION INTRAVENOUS CONTINUOUS PRN
Status: DISCONTINUED | OUTPATIENT
Start: 2021-10-28 | End: 2021-10-28

## 2021-10-28 RX ORDER — OXYTOCIN/RINGER'S LACTATE 30/500 ML
PLASTIC BAG, INJECTION (ML) INTRAVENOUS CONTINUOUS PRN
Status: DISCONTINUED | OUTPATIENT
Start: 2021-10-28 | End: 2021-10-28

## 2021-10-28 RX ORDER — ONDANSETRON 2 MG/ML
4 INJECTION INTRAMUSCULAR; INTRAVENOUS EVERY 8 HOURS PRN
Status: DISCONTINUED | OUTPATIENT
Start: 2021-10-28 | End: 2021-10-31 | Stop reason: HOSPADM

## 2021-10-28 RX ORDER — HYDROMORPHONE HCL/PF 1 MG/ML
0.5 SYRINGE (ML) INJECTION
Status: DISCONTINUED | OUTPATIENT
Start: 2021-10-28 | End: 2021-10-28

## 2021-10-28 RX ORDER — ALBUTEROL SULFATE 2.5 MG/3ML
2.5 SOLUTION RESPIRATORY (INHALATION) ONCE AS NEEDED
Status: DISCONTINUED | OUTPATIENT
Start: 2021-10-28 | End: 2021-10-28

## 2021-10-28 RX ORDER — DOCUSATE SODIUM 100 MG/1
100 CAPSULE, LIQUID FILLED ORAL 2 TIMES DAILY
Status: DISCONTINUED | OUTPATIENT
Start: 2021-10-28 | End: 2021-10-31 | Stop reason: HOSPADM

## 2021-10-28 RX ORDER — FENTANYL CITRATE/PF 50 MCG/ML
25 SYRINGE (ML) INJECTION
Status: DISCONTINUED | OUTPATIENT
Start: 2021-10-28 | End: 2021-10-28

## 2021-10-28 RX ORDER — ONDANSETRON 2 MG/ML
4 INJECTION INTRAMUSCULAR; INTRAVENOUS EVERY 4 HOURS PRN
Status: ACTIVE | OUTPATIENT
Start: 2021-10-28 | End: 2021-10-29

## 2021-10-28 RX ORDER — ACETAMINOPHEN 325 MG/1
650 TABLET ORAL EVERY 4 HOURS PRN
Status: DISCONTINUED | OUTPATIENT
Start: 2021-10-28 | End: 2021-10-29

## 2021-10-28 RX ORDER — PROMETHAZINE HYDROCHLORIDE 25 MG/ML
12.5 INJECTION, SOLUTION INTRAMUSCULAR; INTRAVENOUS ONCE AS NEEDED
Status: DISCONTINUED | OUTPATIENT
Start: 2021-10-28 | End: 2021-10-28

## 2021-10-28 RX ORDER — DIPHENHYDRAMINE HCL 25 MG
25 TABLET ORAL EVERY 6 HOURS PRN
Status: DISCONTINUED | OUTPATIENT
Start: 2021-10-28 | End: 2021-10-31 | Stop reason: HOSPADM

## 2021-10-28 RX ORDER — BUPIVACAINE HYDROCHLORIDE 7.5 MG/ML
INJECTION, SOLUTION INTRASPINAL AS NEEDED
Status: DISCONTINUED | OUTPATIENT
Start: 2021-10-28 | End: 2021-10-28

## 2021-10-28 RX ORDER — KETOROLAC TROMETHAMINE 30 MG/ML
INJECTION, SOLUTION INTRAMUSCULAR; INTRAVENOUS AS NEEDED
Status: DISCONTINUED | OUTPATIENT
Start: 2021-10-28 | End: 2021-10-28

## 2021-10-28 RX ORDER — IBUPROFEN 600 MG/1
600 TABLET ORAL EVERY 6 HOURS PRN
Status: DISCONTINUED | OUTPATIENT
Start: 2021-10-29 | End: 2021-10-29

## 2021-10-28 RX ORDER — NALBUPHINE HCL 10 MG/ML
5 AMPUL (ML) INJECTION EVERY 4 HOURS PRN
Status: DISCONTINUED | OUTPATIENT
Start: 2021-10-28 | End: 2021-10-31 | Stop reason: HOSPADM

## 2021-10-28 RX ORDER — METOCLOPRAMIDE HYDROCHLORIDE 5 MG/ML
INJECTION INTRAMUSCULAR; INTRAVENOUS AS NEEDED
Status: DISCONTINUED | OUTPATIENT
Start: 2021-10-28 | End: 2021-10-28

## 2021-10-28 RX ORDER — NALOXONE HYDROCHLORIDE 0.4 MG/ML
0.1 INJECTION, SOLUTION INTRAMUSCULAR; INTRAVENOUS; SUBCUTANEOUS
Status: ACTIVE | OUTPATIENT
Start: 2021-10-28 | End: 2021-10-29

## 2021-10-28 RX ORDER — LABETALOL 20 MG/4 ML (5 MG/ML) INTRAVENOUS SYRINGE
10
Status: DISCONTINUED | OUTPATIENT
Start: 2021-10-28 | End: 2021-10-28

## 2021-10-28 RX ORDER — OXYCODONE HYDROCHLORIDE 10 MG/1
10 TABLET ORAL EVERY 4 HOURS PRN
Status: DISCONTINUED | OUTPATIENT
Start: 2021-10-29 | End: 2021-10-31 | Stop reason: HOSPADM

## 2021-10-28 RX ORDER — KETOROLAC TROMETHAMINE 30 MG/ML
30 INJECTION, SOLUTION INTRAMUSCULAR; INTRAVENOUS EVERY 6 HOURS
Status: DISPENSED | OUTPATIENT
Start: 2021-10-28 | End: 2021-10-29

## 2021-10-28 RX ORDER — OXYCODONE HYDROCHLORIDE 5 MG/1
5 TABLET ORAL EVERY 4 HOURS PRN
Status: DISCONTINUED | OUTPATIENT
Start: 2021-10-29 | End: 2021-10-31 | Stop reason: HOSPADM

## 2021-10-28 RX ORDER — HYDROMORPHONE HCL/PF 1 MG/ML
0.5 SYRINGE (ML) INJECTION
Status: DISCONTINUED | OUTPATIENT
Start: 2021-10-28 | End: 2021-10-31 | Stop reason: HOSPADM

## 2021-10-28 RX ORDER — HYDRALAZINE HYDROCHLORIDE 20 MG/ML
5 INJECTION INTRAMUSCULAR; INTRAVENOUS
Status: DISCONTINUED | OUTPATIENT
Start: 2021-10-28 | End: 2021-10-28

## 2021-10-28 RX ORDER — MORPHINE SULFATE 1 MG/ML
INJECTION, SOLUTION EPIDURAL; INTRATHECAL; INTRAVENOUS AS NEEDED
Status: DISCONTINUED | OUTPATIENT
Start: 2021-10-28 | End: 2021-10-28

## 2021-10-28 RX ORDER — ONDANSETRON 2 MG/ML
4 INJECTION INTRAMUSCULAR; INTRAVENOUS ONCE AS NEEDED
Status: DISCONTINUED | OUTPATIENT
Start: 2021-10-28 | End: 2021-10-28

## 2021-10-28 RX ORDER — SODIUM CHLORIDE, SODIUM LACTATE, POTASSIUM CHLORIDE, CALCIUM CHLORIDE 600; 310; 30; 20 MG/100ML; MG/100ML; MG/100ML; MG/100ML
125 INJECTION, SOLUTION INTRAVENOUS CONTINUOUS
Status: DISCONTINUED | OUTPATIENT
Start: 2021-10-28 | End: 2021-10-31 | Stop reason: HOSPADM

## 2021-10-28 RX ORDER — METOCLOPRAMIDE HYDROCHLORIDE 5 MG/ML
10 INJECTION INTRAMUSCULAR; INTRAVENOUS ONCE AS NEEDED
Status: DISCONTINUED | OUTPATIENT
Start: 2021-10-28 | End: 2021-10-28

## 2021-10-28 RX ORDER — HYDROMORPHONE HCL/PF 1 MG/ML
0.2 SYRINGE (ML) INJECTION
Status: DISCONTINUED | OUTPATIENT
Start: 2021-10-28 | End: 2021-10-28

## 2021-10-28 RX ORDER — DEXAMETHASONE SODIUM PHOSPHATE 4 MG/ML
8 INJECTION, SOLUTION INTRA-ARTICULAR; INTRALESIONAL; INTRAMUSCULAR; INTRAVENOUS; SOFT TISSUE ONCE AS NEEDED
Status: ACTIVE | OUTPATIENT
Start: 2021-10-28 | End: 2021-10-29

## 2021-10-28 RX ORDER — ONDANSETRON 2 MG/ML
INJECTION INTRAMUSCULAR; INTRAVENOUS AS NEEDED
Status: DISCONTINUED | OUTPATIENT
Start: 2021-10-28 | End: 2021-10-28

## 2021-10-28 RX ORDER — DIPHENHYDRAMINE HYDROCHLORIDE 50 MG/ML
25 INJECTION INTRAMUSCULAR; INTRAVENOUS EVERY 6 HOURS PRN
Status: ACTIVE | OUTPATIENT
Start: 2021-10-28 | End: 2021-10-29

## 2021-10-28 RX ORDER — METOCLOPRAMIDE HYDROCHLORIDE 5 MG/ML
5 INJECTION INTRAMUSCULAR; INTRAVENOUS EVERY 6 HOURS PRN
Status: ACTIVE | OUTPATIENT
Start: 2021-10-28 | End: 2021-10-29

## 2021-10-28 RX ORDER — OXYTOCIN/RINGER'S LACTATE 30/500 ML
62.5 PLASTIC BAG, INJECTION (ML) INTRAVENOUS CONTINUOUS
Status: ACTIVE | OUTPATIENT
Start: 2021-10-28 | End: 2021-10-28

## 2021-10-28 RX ADMIN — ONDANSETRON 4 MG: 2 INJECTION INTRAMUSCULAR; INTRAVENOUS at 09:02

## 2021-10-28 RX ADMIN — PHENYLEPHRINE HYDROCHLORIDE 50 MCG/MIN: 10 INJECTION INTRAVENOUS at 08:54

## 2021-10-28 RX ADMIN — INSULIN GLARGINE 25 UNITS: 100 INJECTION, SOLUTION SUBCUTANEOUS at 00:15

## 2021-10-28 RX ADMIN — AMPICILLIN SODIUM 2000 MG: 2 INJECTION, POWDER, FOR SOLUTION INTRAMUSCULAR; INTRAVENOUS at 00:18

## 2021-10-28 RX ADMIN — BUPIVACAINE HYDROCHLORIDE IN DEXTROSE 1.6 ML: 7.5 INJECTION, SOLUTION SUBARACHNOID at 08:53

## 2021-10-28 RX ADMIN — CEFAZOLIN SODIUM 2000 MG: 2 SOLUTION INTRAVENOUS at 08:55

## 2021-10-28 RX ADMIN — SODIUM CHLORIDE, SODIUM LACTATE, POTASSIUM CHLORIDE, AND CALCIUM CHLORIDE 125 ML/HR: .6; .31; .03; .02 INJECTION, SOLUTION INTRAVENOUS at 22:57

## 2021-10-28 RX ADMIN — KETOROLAC TROMETHAMINE 30 MG: 30 INJECTION, SOLUTION INTRAMUSCULAR at 17:42

## 2021-10-28 RX ADMIN — MORPHINE SULFATE 0.15 MG: 1 INJECTION, SOLUTION EPIDURAL; INTRATHECAL; INTRAVENOUS at 08:53

## 2021-10-28 RX ADMIN — DEXTROSE AND SODIUM CHLORIDE 100 ML/HR: 5; .9 INJECTION, SOLUTION INTRAVENOUS at 00:57

## 2021-10-28 RX ADMIN — FAMOTIDINE 20 MG: 10 INJECTION, SOLUTION INTRAVENOUS at 08:40

## 2021-10-28 RX ADMIN — Medication 62.5 MILLI-UNITS/MIN: at 11:28

## 2021-10-28 RX ADMIN — Medication 250 MILLI-UNITS/MIN: at 09:17

## 2021-10-28 RX ADMIN — METOCLOPRAMIDE HYDROCHLORIDE 10 MG: 5 INJECTION INTRAMUSCULAR; INTRAVENOUS at 08:48

## 2021-10-28 RX ADMIN — SODIUM CHLORIDE 1.5 UNITS/HR: 9 INJECTION, SOLUTION INTRAVENOUS at 01:02

## 2021-10-28 RX ADMIN — SODIUM CHLORIDE 125 ML/HR: 0.9 INJECTION, SOLUTION INTRAVENOUS at 07:15

## 2021-10-28 RX ADMIN — AMPICILLIN SODIUM 2000 MG: 2 INJECTION, POWDER, FOR SOLUTION INTRAMUSCULAR; INTRAVENOUS at 07:19

## 2021-10-28 RX ADMIN — KETOROLAC TROMETHAMINE 30 MG: 30 INJECTION, SOLUTION INTRAMUSCULAR at 09:45

## 2021-10-28 RX ADMIN — SODIUM CHLORIDE 125 ML/HR: 0.9 INJECTION, SOLUTION INTRAVENOUS at 15:49

## 2021-10-28 RX ADMIN — SODIUM CHLORIDE: 0.9 INJECTION, SOLUTION INTRAVENOUS at 08:38

## 2021-10-29 ENCOUNTER — APPOINTMENT (OUTPATIENT)
Dept: PERINATAL CARE | Facility: OTHER | Age: 36
DRG: 540 | End: 2021-10-29
Payer: COMMERCIAL

## 2021-10-29 LAB
ALBUMIN SERPL BCP-MCNC: 1.8 G/DL (ref 3.5–5)
ALP SERPL-CCNC: 110 U/L (ref 46–116)
ALT SERPL W P-5'-P-CCNC: 116 U/L (ref 12–78)
ANION GAP SERPL CALCULATED.3IONS-SCNC: 11 MMOL/L (ref 4–13)
AST SERPL W P-5'-P-CCNC: 42 U/L (ref 5–45)
BILIRUB SERPL-MCNC: 0.24 MG/DL (ref 0.2–1)
BUN SERPL-MCNC: 15 MG/DL (ref 5–25)
CALCIUM ALBUM COR SERPL-MCNC: 10.4 MG/DL (ref 8.3–10.1)
CALCIUM SERPL-MCNC: 8.6 MG/DL (ref 8.3–10.1)
CHLORIDE SERPL-SCNC: 109 MMOL/L (ref 100–108)
CO2 SERPL-SCNC: 20 MMOL/L (ref 21–32)
CREAT SERPL-MCNC: 0.71 MG/DL (ref 0.6–1.3)
ERYTHROCYTE [DISTWIDTH] IN BLOOD BY AUTOMATED COUNT: 15.5 % (ref 11.6–15.1)
GFR SERPL CREATININE-BSD FRML MDRD: 111 ML/MIN/1.73SQ M
GLUCOSE SERPL-MCNC: 103 MG/DL (ref 65–140)
GLUCOSE SERPL-MCNC: 77 MG/DL (ref 65–140)
HCT VFR BLD AUTO: 31 % (ref 34.8–46.1)
HGB BLD-MCNC: 10 G/DL (ref 11.5–15.4)
MCH RBC QN AUTO: 28.7 PG (ref 26.8–34.3)
MCHC RBC AUTO-ENTMCNC: 32.3 G/DL (ref 31.4–37.4)
MCV RBC AUTO: 89 FL (ref 82–98)
PLATELET # BLD AUTO: 307 THOUSANDS/UL (ref 149–390)
PMV BLD AUTO: 10 FL (ref 8.9–12.7)
POTASSIUM SERPL-SCNC: 4.2 MMOL/L (ref 3.5–5.3)
PROT SERPL-MCNC: 5.5 G/DL (ref 6.4–8.2)
RBC # BLD AUTO: 3.48 MILLION/UL (ref 3.81–5.12)
SODIUM SERPL-SCNC: 140 MMOL/L (ref 136–145)
WBC # BLD AUTO: 12.02 THOUSAND/UL (ref 4.31–10.16)

## 2021-10-29 PROCEDURE — 85027 COMPLETE CBC AUTOMATED: CPT | Performed by: OBSTETRICS & GYNECOLOGY

## 2021-10-29 PROCEDURE — 99024 POSTOP FOLLOW-UP VISIT: CPT | Performed by: STUDENT IN AN ORGANIZED HEALTH CARE EDUCATION/TRAINING PROGRAM

## 2021-10-29 PROCEDURE — 80053 COMPREHEN METABOLIC PANEL: CPT | Performed by: OBSTETRICS & GYNECOLOGY

## 2021-10-29 PROCEDURE — 82948 REAGENT STRIP/BLOOD GLUCOSE: CPT

## 2021-10-29 RX ORDER — ACETAMINOPHEN 325 MG/1
650 TABLET ORAL EVERY 6 HOURS SCHEDULED
Status: DISCONTINUED | OUTPATIENT
Start: 2021-10-29 | End: 2021-10-31 | Stop reason: HOSPADM

## 2021-10-29 RX ORDER — IBUPROFEN 600 MG/1
600 TABLET ORAL EVERY 6 HOURS SCHEDULED
Status: DISCONTINUED | OUTPATIENT
Start: 2021-10-29 | End: 2021-10-31 | Stop reason: HOSPADM

## 2021-10-29 RX ADMIN — DOCUSATE SODIUM 100 MG: 100 CAPSULE ORAL at 08:46

## 2021-10-29 RX ADMIN — IBUPROFEN 600 MG: 600 TABLET ORAL at 15:30

## 2021-10-29 RX ADMIN — ENOXAPARIN SODIUM 40 MG: 40 INJECTION SUBCUTANEOUS at 08:45

## 2021-10-29 RX ADMIN — KETOROLAC TROMETHAMINE 30 MG: 30 INJECTION, SOLUTION INTRAMUSCULAR at 06:00

## 2021-10-29 RX ADMIN — ACETAMINOPHEN 650 MG: 325 TABLET, FILM COATED ORAL at 20:06

## 2021-10-29 RX ADMIN — OXYCODONE HYDROCHLORIDE 10 MG: 10 TABLET ORAL at 13:39

## 2021-10-29 RX ADMIN — ACETAMINOPHEN 650 MG: 325 TABLET, FILM COATED ORAL at 13:47

## 2021-10-30 PROBLEM — O34.211 MATERNAL CARE DUE TO LOW TRANSVERSE UTERINE SCAR FROM PREVIOUS CESAREAN DELIVERY: Status: RESOLVED | Noted: 2021-05-19 | Resolved: 2021-10-30

## 2021-10-30 PROBLEM — Z3A.32 32 WEEKS GESTATION OF PREGNANCY: Status: RESOLVED | Noted: 2021-10-07 | Resolved: 2021-10-30

## 2021-10-30 PROBLEM — Z28.21 COVID-19 VACCINATION DECLINED: Status: RESOLVED | Noted: 2021-10-06 | Resolved: 2021-10-30

## 2021-10-30 PROBLEM — Z34.83 PRENATAL CARE, SUBSEQUENT PREGNANCY, THIRD TRIMESTER: Status: RESOLVED | Noted: 2021-09-21 | Resolved: 2021-10-30

## 2021-10-30 PROBLEM — O35.2XX0 HEREDITARY DISEASE IN FAMILY POSSIBLY AFFECTING FETUS: Status: RESOLVED | Noted: 2021-05-19 | Resolved: 2021-10-30

## 2021-10-30 PROBLEM — O09.529 ANTEPARTUM MULTIGRAVIDA OF ADVANCED MATERNAL AGE: Status: RESOLVED | Noted: 2021-04-22 | Resolved: 2021-10-30

## 2021-10-30 PROBLEM — R74.01 ELEVATED ALT MEASUREMENT: Status: ACTIVE | Noted: 2021-10-30

## 2021-10-30 LAB
ABO GROUP BLD BPU: NORMAL
ABO GROUP BLD BPU: NORMAL
ALBUMIN SERPL BCP-MCNC: 1.9 G/DL (ref 3.5–5)
ALP SERPL-CCNC: 109 U/L (ref 46–116)
ALT SERPL W P-5'-P-CCNC: 116 U/L (ref 12–78)
ANION GAP SERPL CALCULATED.3IONS-SCNC: 11 MMOL/L (ref 4–13)
AST SERPL W P-5'-P-CCNC: 34 U/L (ref 5–45)
BILIRUB SERPL-MCNC: 0.2 MG/DL (ref 0.2–1)
BPU ID: NORMAL
BPU ID: NORMAL
BUN SERPL-MCNC: 19 MG/DL (ref 5–25)
CALCIUM ALBUM COR SERPL-MCNC: 9.9 MG/DL (ref 8.3–10.1)
CALCIUM SERPL-MCNC: 8.2 MG/DL (ref 8.3–10.1)
CHLORIDE SERPL-SCNC: 106 MMOL/L (ref 100–108)
CO2 SERPL-SCNC: 23 MMOL/L (ref 21–32)
CREAT SERPL-MCNC: 0.8 MG/DL (ref 0.6–1.3)
CROSSMATCH: NORMAL
CROSSMATCH: NORMAL
GFR SERPL CREATININE-BSD FRML MDRD: 96 ML/MIN/1.73SQ M
GLUCOSE SERPL-MCNC: 92 MG/DL (ref 65–140)
POTASSIUM SERPL-SCNC: 3.8 MMOL/L (ref 3.5–5.3)
PROT SERPL-MCNC: 5.9 G/DL (ref 6.4–8.2)
SODIUM SERPL-SCNC: 140 MMOL/L (ref 136–145)
UNIT DISPENSE STATUS: NORMAL
UNIT DISPENSE STATUS: NORMAL
UNIT PRODUCT CODE: NORMAL
UNIT PRODUCT CODE: NORMAL
UNIT PRODUCT VOLUME: 350 ML
UNIT PRODUCT VOLUME: 350 ML
UNIT RH: NORMAL
UNIT RH: NORMAL

## 2021-10-30 PROCEDURE — 80053 COMPREHEN METABOLIC PANEL: CPT | Performed by: OBSTETRICS & GYNECOLOGY

## 2021-10-30 PROCEDURE — 99024 POSTOP FOLLOW-UP VISIT: CPT | Performed by: OBSTETRICS & GYNECOLOGY

## 2021-10-30 RX ORDER — SIMETHICONE 80 MG
80 TABLET,CHEWABLE ORAL EVERY 6 HOURS PRN
Status: DISCONTINUED | OUTPATIENT
Start: 2021-10-30 | End: 2021-10-31 | Stop reason: HOSPADM

## 2021-10-30 RX ADMIN — ACETAMINOPHEN 650 MG: 325 TABLET, FILM COATED ORAL at 08:34

## 2021-10-30 RX ADMIN — IBUPROFEN 600 MG: 600 TABLET ORAL at 05:45

## 2021-10-30 RX ADMIN — ACETAMINOPHEN 650 MG: 325 TABLET, FILM COATED ORAL at 00:50

## 2021-10-30 RX ADMIN — IBUPROFEN 600 MG: 600 TABLET ORAL at 13:09

## 2021-10-30 RX ADMIN — DOCUSATE SODIUM 100 MG: 100 CAPSULE ORAL at 18:13

## 2021-10-30 RX ADMIN — SIMETHICONE 80 MG: 80 TABLET, CHEWABLE ORAL at 20:32

## 2021-10-30 RX ADMIN — IBUPROFEN 600 MG: 600 TABLET ORAL at 00:50

## 2021-10-30 RX ADMIN — ENOXAPARIN SODIUM 40 MG: 40 INJECTION SUBCUTANEOUS at 08:35

## 2021-10-30 RX ADMIN — ACETAMINOPHEN 650 MG: 325 TABLET, FILM COATED ORAL at 20:32

## 2021-10-30 RX ADMIN — DOCUSATE SODIUM 100 MG: 100 CAPSULE ORAL at 08:34

## 2021-10-30 RX ADMIN — IBUPROFEN 600 MG: 600 TABLET ORAL at 18:13

## 2021-10-31 VITALS
DIASTOLIC BLOOD PRESSURE: 80 MMHG | HEART RATE: 86 BPM | RESPIRATION RATE: 18 BRPM | HEIGHT: 60 IN | SYSTOLIC BLOOD PRESSURE: 142 MMHG | BODY MASS INDEX: 34.99 KG/M2 | TEMPERATURE: 99.3 F | WEIGHT: 178.2 LBS | OXYGEN SATURATION: 99 %

## 2021-10-31 PROCEDURE — 99024 POSTOP FOLLOW-UP VISIT: CPT | Performed by: OBSTETRICS & GYNECOLOGY

## 2021-10-31 RX ORDER — ACETAMINOPHEN 325 MG/1
650 TABLET ORAL EVERY 6 HOURS SCHEDULED
Refills: 0
Start: 2021-10-31

## 2021-10-31 RX ORDER — IBUPROFEN 600 MG/1
600 TABLET ORAL EVERY 6 HOURS SCHEDULED
Qty: 30 TABLET | Refills: 0
Start: 2021-10-31 | End: 2021-11-18

## 2021-10-31 RX ORDER — OXYCODONE HYDROCHLORIDE 10 MG/1
10 TABLET ORAL EVERY 4 HOURS PRN
Qty: 10 TABLET | Refills: 0 | Status: SHIPPED | OUTPATIENT
Start: 2021-10-31 | End: 2021-11-10

## 2021-10-31 RX ORDER — DOCUSATE SODIUM 100 MG/1
100 CAPSULE, LIQUID FILLED ORAL 2 TIMES DAILY
Refills: 0
Start: 2021-10-31 | End: 2021-11-18 | Stop reason: ALTCHOICE

## 2021-10-31 RX ADMIN — ENOXAPARIN SODIUM 40 MG: 40 INJECTION SUBCUTANEOUS at 08:25

## 2021-10-31 RX ADMIN — ACETAMINOPHEN 650 MG: 325 TABLET, FILM COATED ORAL at 08:25

## 2021-10-31 RX ADMIN — SIMETHICONE 80 MG: 80 TABLET, CHEWABLE ORAL at 08:25

## 2021-10-31 RX ADMIN — IBUPROFEN 600 MG: 600 TABLET ORAL at 06:18

## 2021-10-31 RX ADMIN — DOCUSATE SODIUM 100 MG: 100 CAPSULE ORAL at 08:25

## 2021-10-31 RX ADMIN — SIMETHICONE 80 MG: 80 TABLET, CHEWABLE ORAL at 02:34

## 2021-10-31 RX ADMIN — ACETAMINOPHEN 650 MG: 325 TABLET, FILM COATED ORAL at 02:34

## 2021-11-03 ENCOUNTER — TELEPHONE (OUTPATIENT)
Dept: OBGYN CLINIC | Facility: CLINIC | Age: 36
End: 2021-11-03

## 2021-11-03 ENCOUNTER — PATIENT MESSAGE (OUTPATIENT)
Dept: OBGYN CLINIC | Facility: CLINIC | Age: 36
End: 2021-11-03

## 2021-11-18 ENCOUNTER — POSTPARTUM VISIT (OUTPATIENT)
Dept: OBGYN CLINIC | Facility: CLINIC | Age: 36
End: 2021-11-18

## 2021-11-18 ENCOUNTER — TELEPHONE (OUTPATIENT)
Dept: OBGYN CLINIC | Facility: CLINIC | Age: 36
End: 2021-11-18

## 2021-11-18 VITALS
SYSTOLIC BLOOD PRESSURE: 110 MMHG | DIASTOLIC BLOOD PRESSURE: 70 MMHG | BODY MASS INDEX: 32.79 KG/M2 | HEIGHT: 60 IN | WEIGHT: 167 LBS

## 2021-11-18 DIAGNOSIS — L29.9 ITCH OF SKIN: ICD-10-CM

## 2021-11-18 DIAGNOSIS — O24.414 INSULIN CONTROLLED GESTATIONAL DIABETES MELLITUS (GDM) IN THIRD TRIMESTER: ICD-10-CM

## 2021-11-18 DIAGNOSIS — Z30.09 ENCOUNTER FOR OTHER GENERAL COUNSELING OR ADVICE ON CONTRACEPTION: ICD-10-CM

## 2021-11-18 DIAGNOSIS — Z98.891 STATUS POST REPEAT LOW TRANSVERSE CESAREAN SECTION: ICD-10-CM

## 2021-11-18 PROBLEM — Z30.9 CONTRACEPTIVE MANAGEMENT: Status: ACTIVE | Noted: 2021-11-18

## 2021-11-18 PROCEDURE — 99024 POSTOP FOLLOW-UP VISIT: CPT | Performed by: NURSE PRACTITIONER

## 2021-11-18 RX ORDER — NYSTATIN 100000 U/G
CREAM TOPICAL 2 TIMES DAILY
Qty: 30 G | Refills: 0 | Status: SHIPPED | OUTPATIENT
Start: 2021-11-18

## 2021-12-10 ENCOUNTER — PROCEDURE VISIT (OUTPATIENT)
Dept: OBGYN CLINIC | Facility: CLINIC | Age: 36
End: 2021-12-10
Payer: COMMERCIAL

## 2021-12-10 VITALS
DIASTOLIC BLOOD PRESSURE: 80 MMHG | HEIGHT: 60 IN | BODY MASS INDEX: 33.3 KG/M2 | WEIGHT: 169.6 LBS | SYSTOLIC BLOOD PRESSURE: 120 MMHG

## 2021-12-10 DIAGNOSIS — Z32.02 NEGATIVE PREGNANCY TEST: Primary | ICD-10-CM

## 2021-12-10 DIAGNOSIS — Z30.017 NEXPLANON INSERTION: ICD-10-CM

## 2021-12-10 LAB — SL AMB POCT URINE HCG: NEGATIVE

## 2021-12-10 PROCEDURE — 81025 URINE PREGNANCY TEST: CPT | Performed by: NURSE PRACTITIONER

## 2021-12-10 PROCEDURE — 11983 REMOVE/INSERT DRUG IMPLANT: CPT | Performed by: NURSE PRACTITIONER

## 2021-12-10 RX ORDER — ETONOGESTREL 68 MG/1
68 IMPLANT SUBCUTANEOUS ONCE
COMMUNITY

## 2022-02-17 PROBLEM — O99.213 OBESITY AFFECTING PREGNANCY IN THIRD TRIMESTER: Status: RESOLVED | Noted: 2021-05-19 | Resolved: 2022-02-17

## 2022-02-17 PROBLEM — O41.03X0: Status: RESOLVED | Noted: 2021-10-26 | Resolved: 2022-02-17

## 2022-02-17 PROBLEM — Z98.891 STATUS POST REPEAT LOW TRANSVERSE CESAREAN SECTION: Status: RESOLVED | Noted: 2021-10-28 | Resolved: 2022-02-17

## 2022-02-17 PROBLEM — Z3A.35 35 WEEKS GESTATION OF PREGNANCY: Status: RESOLVED | Noted: 2021-10-15 | Resolved: 2022-02-17

## 2024-12-09 ENCOUNTER — APPOINTMENT (EMERGENCY)
Dept: RADIOLOGY | Facility: HOSPITAL | Age: 39
End: 2024-12-09
Payer: COMMERCIAL

## 2024-12-09 ENCOUNTER — HOSPITAL ENCOUNTER (EMERGENCY)
Facility: HOSPITAL | Age: 39
Discharge: HOME | End: 2024-12-10
Attending: EMERGENCY MEDICINE
Payer: COMMERCIAL

## 2024-12-09 VITALS
HEART RATE: 102 BPM | DIASTOLIC BLOOD PRESSURE: 84 MMHG | OXYGEN SATURATION: 98 % | TEMPERATURE: 98.5 F | RESPIRATION RATE: 16 BRPM | SYSTOLIC BLOOD PRESSURE: 154 MMHG

## 2024-12-09 DIAGNOSIS — S93.401A RIGHT ANKLE SPRAIN: Primary | ICD-10-CM

## 2024-12-09 PROCEDURE — 96372 THER/PROPH/DIAG INJ SC/IM: CPT

## 2024-12-09 PROCEDURE — 99283 EMERGENCY DEPT VISIT LOW MDM: CPT

## 2024-12-09 PROCEDURE — 73600 X-RAY EXAM OF ANKLE: CPT

## 2024-12-09 RX ORDER — KETOROLAC TROMETHAMINE 30 MG/ML
15 INJECTION, SOLUTION INTRAMUSCULAR; INTRAVENOUS ONCE
Status: COMPLETED | OUTPATIENT
Start: 2024-12-09 | End: 2024-12-09

## 2024-12-09 RX ADMIN — KETOROLAC TROMETHAMINE 15 MG: 30 INJECTION, SOLUTION INTRAMUSCULAR at 23:05

## 2024-12-10 PROCEDURE — 99284 EMERGENCY DEPT VISIT MOD MDM: CPT | Performed by: EMERGENCY MEDICINE

## 2024-12-10 RX ORDER — IBUPROFEN 800 MG/1
800 TABLET, FILM COATED ORAL 3 TIMES DAILY
Qty: 21 TABLET | Refills: 0 | Status: SHIPPED | OUTPATIENT
Start: 2024-12-10

## 2024-12-10 NOTE — ED PROVIDER NOTES
Time reflects when diagnosis was documented in both MDM as applicable and the Disposition within this note       Time User Action Codes Description Comment    12/10/2024 12:27 AM Hao Bah Add [S93.401A] Right ankle sprain           ED Disposition       ED Disposition   Discharge    Condition   Stable    Date/Time   Tue Dec 10, 2024 12:31 AM    Comment   Christine Jonas discharge to home/self care.                   Assessment & Plan       Medical Decision Making  39-year-old female with right ankle pain.  She seems to have some tenderness and swelling of the deltoid ligament of the right ankle, will check x-ray, recommend crutches brace Ortho follow-up as needed    Amount and/or Complexity of Data Reviewed  Radiology: ordered.    Risk  Prescription drug management.             Medications   ketorolac (TORADOL) injection 15 mg (15 mg Intramuscular Given 12/9/24 2305)       ED Risk Strat Scores                           SBIRT 22yo+      Flowsheet Row Most Recent Value   Initial Alcohol Screen: US AUDIT-C     1. How often do you have a drink containing alcohol? 0 Filed at: 12/10/2024 0032   2. How many drinks containing alcohol do you have on a typical day you are drinking?  0 Filed at: 12/10/2024 0032   3a. Male UNDER 65: How often do you have five or more drinks on one occasion? 0 Filed at: 12/10/2024 0032   3b. FEMALE Any Age, or MALE 65+: How often do you have 4 or more drinks on one occassion? 0 Filed at: 12/10/2024 0032   Audit-C Score 0 Filed at: 12/10/2024 0032   RICK: How many times in the past year have you...    Used an illegal drug or used a prescription medication for non-medical reasons? Never Filed at: 12/10/2024 0032                            History of Present Illness       Chief Complaint   Patient presents with    Foot Pain     Pt arrives with complaints of right foot pain starting today. PT says she was not doing anything when this started          Past Medical History:   Diagnosis Date    Anxiety      no tx or counseling    CTS (carpal tunnel syndrome)         Diabetes mellitus (HCC)     gdm    GERD (gastroesophageal reflux disease)     Gestational diabetes 2014    tx during pregnancy    Varicella       Past Surgical History:   Procedure Laterality Date     SECTION      fetal heartrate was erratic    CHOLECYSTECTOMY      CHOLECYSTECTOMY LAPAROSCOPIC N/A 10/16/2018    Procedure: CHOLECYSTECTOMY LAPAROSCOPIC w/ IOC;  Surgeon: Hill Johnson MD;  Location: [facility];  Service: General    UT  DELIVERY ONLY N/A 10/28/2021    Procedure:  SECTION () REPEAT;  Surgeon: Jannet Brantley MD;  Location: [facility] ;  Service: Obstetrics      Family History   Problem Relation Age of Onset    Fibromyalgia Mother     Alcohol abuse Mother     Substance Abuse Mother     Diabetes Father     Skin cancer Father     Blindness Brother     Cerebral palsy Brother     Strabismus Son     Cancer Maternal Grandmother         unkown type    Diabetes Paternal Grandmother     Glaucoma Paternal Grandmother     Colon cancer Paternal Grandfather     Galactosemia Half-Brother       Social History     Tobacco Use    Smoking status: Former     Current packs/day: 0.00     Average packs/day: 0.3 packs/day for 1 year (0.3 ttl pk-yrs)     Types: Cigars, Cigarettes     Start date: 3/1/2020     Quit date: 3/1/2021     Years since quitting: 3.7    Smokeless tobacco: Never    Tobacco comments:     1-5 cigars a day. no cigs for 3 yrs   Vaping Use    Vaping status: Never Used   Substance Use Topics    Alcohol use: Not Currently     Comment: once a year if any    Drug use: Not Currently     Types: Marijuana     Comment: once in morning for nausea      E-Cigarette/Vaping    E-Cigarette Use Never User       E-Cigarette/Vaping Substances    Nicotine No     THC No     CBD No     Flavoring No     Other No     Unknown No       I have reviewed and agree with the history as documented.     39-year-old female presenting to the  emergency department for evaluation of foot pain.  Patient has aching right lateral foot/ankle pain, she does not recall any trauma or inciting events, there is some swelling and tenderness as well no numbness weakness or tingling.  No erythema warmth fever or chills.        Review of Systems   Constitutional:  Negative for appetite change, chills, fatigue and fever.   HENT:  Negative for sneezing and sore throat.    Eyes:  Negative for visual disturbance.   Respiratory:  Negative for cough, choking, chest tightness, shortness of breath and wheezing.    Cardiovascular:  Negative for chest pain and palpitations.   Gastrointestinal:  Negative for abdominal pain, constipation, diarrhea, nausea and vomiting.   Genitourinary:  Negative for difficulty urinating and dysuria.   Musculoskeletal:  Positive for arthralgias.   Neurological:  Negative for dizziness, weakness, light-headedness, numbness and headaches.   All other systems reviewed and are negative.          Objective       ED Triage Vitals   Temperature Pulse Blood Pressure Respirations SpO2 Patient Position - Orthostatic VS   12/09/24 2050 12/09/24 2050 12/09/24 2050 12/09/24 2050 12/09/24 2050 --   98.5 °F (36.9 °C) 102 154/84 16 98 %       Temp Source Heart Rate Source BP Location FiO2 (%) Pain Score    12/09/24 2050 12/09/24 2050 -- -- 12/09/24 2305    Oral Monitor   9      Vitals      Date and Time Temp Pulse SpO2 Resp BP Pain Score FACES Pain Rating User   12/09/24 2305 -- -- -- -- -- 9 -- JR   12/09/24 2050 98.5 °F (36.9 °C) 102 98 % 16 154/84 -- -- VINCENT            Physical Exam  Vitals and nursing note reviewed.   Constitutional:       General: She is not in acute distress.     Appearance: She is well-developed. She is not diaphoretic.   HENT:      Head: Normocephalic and atraumatic.   Eyes:      Pupils: Pupils are equal, round, and reactive to light.   Neck:      Vascular: No JVD.      Trachea: No tracheal deviation.   Cardiovascular:      Rate and Rhythm:  Normal rate and regular rhythm.      Heart sounds: Normal heart sounds. No murmur heard.     No friction rub. No gallop.   Pulmonary:      Effort: Pulmonary effort is normal. No respiratory distress.      Breath sounds: Normal breath sounds. No wheezing or rales.   Abdominal:      General: Bowel sounds are normal. There is no distension.      Palpations: Abdomen is soft.      Tenderness: There is no abdominal tenderness. There is no guarding or rebound.   Musculoskeletal:      Comments: Tenderness and swelling over right deltoid ligament   Skin:     General: Skin is warm and dry.      Coloration: Skin is not pale.   Neurological:      Mental Status: She is alert and oriented to person, place, and time.      Cranial Nerves: No cranial nerve deficit.      Motor: No abnormal muscle tone.   Psychiatric:         Behavior: Behavior normal.         Results Reviewed       None            XR ankle 2 views RIGHT    (Results Pending)       Procedures    ED Medication and Procedure Management   Prior to Admission Medications   Prescriptions Last Dose Informant Patient Reported? Taking?   Prenatal Vit-Fe Fumarate-FA (PRENATAL 1+1 PO)  Self Yes No   Sig: Take by mouth    acetaminophen (TYLENOL) 325 mg tablet  Self No No   Sig: Take 2 tablets (650 mg total) by mouth every 6 (six) hours   Patient not taking: Reported on 12/10/2021    etonogestrel (Nexplanon) subdermal implant  Self Yes No   Si mg by Subdermal route once   nystatin (MYCOSTATIN) cream  Self No No   Sig: Apply topically 2 (two) times a day   omeprazole (PriLOSEC) 20 mg delayed release capsule   No No   Sig: Take 2 capsules by mouth daily for 14 days      Facility-Administered Medications: None     Discharge Medication List as of 12/10/2024 12:31 AM        START taking these medications    Details   ibuprofen (MOTRIN) 800 mg tablet Take 1 tablet (800 mg total) by mouth 3 (three) times a day, Starting Tue 12/10/2024, Normal           CONTINUE these medications  which have NOT CHANGED    Details   acetaminophen (TYLENOL) 325 mg tablet Take 2 tablets (650 mg total) by mouth every 6 (six) hours, Starting Sun 10/31/2021, No Print      etonogestrel (Nexplanon) subdermal implant 68 mg by Subdermal route once, Historical Med      nystatin (MYCOSTATIN) cream Apply topically 2 (two) times a day, Starting Thu 11/18/2021, Normal      omeprazole (PriLOSEC) 20 mg delayed release capsule Take 2 capsules by mouth daily for 14 days, Starting Sat 9/23/2017, Until Thu 4/22/2021, Print      Prenatal Vit-Fe Fumarate-FA (PRENATAL 1+1 PO) Take by mouth , Historical Med           No discharge procedures on file.  ED SEPSIS DOCUMENTATION   Time reflects when diagnosis was documented in both MDM as applicable and the Disposition within this note       Time User Action Codes Description Comment    12/10/2024 12:27 AM Hao Bah Add [S93.401A] Right ankle sprain                  Hao Bah MD  12/10/24 0435

## (undated) DEVICE — ENDOPATH XCEL BLADELESS TROCARS WITH STABILITY SLEEVES: Brand: ENDOPATH XCEL

## (undated) DEVICE — LIGAMAX 5 MM ENDOSCOPIC MULTIPLE CLIP APPLIER: Brand: LIGAMAX

## (undated) DEVICE — IV CATH 14 G X 1.75

## (undated) DEVICE — DRAPE C-ARM X-RAY

## (undated) DEVICE — SKIN MARKER DUAL TIP WITH RULER CAP, FLEXIBLE RULER AND LABELS: Brand: DEVON

## (undated) DEVICE — SUT VICRYL 0 CT-1 36 IN J946H

## (undated) DEVICE — FLOSEAL APPLICATOR TIP ENDOSCOPIC

## (undated) DEVICE — INTENDED FOR TISSUE SEPARATION, AND OTHER PROCEDURES THAT REQUIRE A SHARP SURGICAL BLADE TO PUNCTURE OR CUT.: Brand: BARD-PARKER SAFETY BLADES SIZE 15, STERILE

## (undated) DEVICE — GAUZE SPONGES,8 PLY: Brand: CURITY

## (undated) DEVICE — [HIGH FLOW INSUFFLATOR,  DO NOT USE IF PACKAGE IS DAMAGED,  KEEP DRY,  KEEP AWAY FROM SUNLIGHT,  PROTECT FROM HEAT AND RADIOACTIVE SOURCES.]: Brand: PNEUMOSURE

## (undated) DEVICE — REM POLYHESIVE ADULT PATIENT RETURN ELECTRODE: Brand: VALLEYLAB

## (undated) DEVICE — MEDI-VAC YANKAUER SUCTION HANDLE W/STRAIGHT TIP & CONTROL VENT: Brand: CARDINAL HEALTH

## (undated) DEVICE — ABDOMINAL PAD: Brand: DERMACEA

## (undated) DEVICE — GLOVE INDICATOR PI UNDERGLOVE SZ 6.5 BLUE

## (undated) DEVICE — SCD SEQUENTIAL COMPRESSION COMFORT SLEEVE MEDIUM KNEE LENGTH: Brand: KENDALL SCD

## (undated) DEVICE — ENDOPOUCH RETRIEVER SPECIMEN RETRIEVAL BAGS: Brand: ENDOPOUCH RETRIEVER

## (undated) DEVICE — LIGHT HANDLE COVER SLEEVE DISP BLUE STELLAR

## (undated) DEVICE — ELECTRODE LAP L WIRE E-Z CLEAN 33CM -0100

## (undated) DEVICE — GLOVE SRG BIOGEL ECLIPSE 7.5

## (undated) DEVICE — GLOVE PI ULTRA TOUCH SZ.6.5

## (undated) DEVICE — SUT VICRYL 4-0 PS-2 27 IN J426H

## (undated) DEVICE — 3M™ STERI-STRIP™ REINFORCED ADHESIVE SKIN CLOSURES, R1546, 1/4 IN X 4 IN (6 MM X 100 MM), 10 STRIPS/ENVELOPE: Brand: 3M™ STERI-STRIP™

## (undated) DEVICE — TELFA NON-ADHERENT ABSORBENT DRESSING: Brand: TELFA

## (undated) DEVICE — COTTON TIP APPLICTOR 2 PK

## (undated) DEVICE — ALLENTOWN LAP CHOLE APP PACK: Brand: CARDINAL HEALTH

## (undated) DEVICE — PACK C-SECTION PBDS

## (undated) DEVICE — CHLORAPREP HI-LITE 26ML ORANGE

## (undated) DEVICE — 3M™ TEGADERM™ TRANSPARENT FILM DRESSING FRAME STYLE, 1624W, 2-3/8 IN X 2-3/4 IN (6 CM X 7 CM), 100/CT 4CT/CASE: Brand: 3M™ TEGADERM™

## (undated) DEVICE — GLOVE INDICATOR PI UNDERGLOVE SZ 7.5 BLUE

## (undated) DEVICE — IRRIG ENDO FLO TUBING

## (undated) DEVICE — DRAPE EQUIPMENT RF WAND

## (undated) DEVICE — SUT MONOCRYL 3-0 UNDYED KS CS-1 Y523H

## (undated) DEVICE — Device

## (undated) DEVICE — SUT VICRYL 0 CTX 36 IN J978H

## (undated) DEVICE — 5 MM CURVED DISSECTORS WITH MONOPOLAR CAUTERY: Brand: ENDOPATH

## (undated) DEVICE — FLOSEAL MATRIX IS INDICATED IN SURGICAL PROCEDURES (OTHER THAN IN OPHTHALMIC) AS AN ADJUNCT TO HEMOSTASIS WHEN CONTROL OF BLEEDING BY LIGATURE OR CONVENTIONALPROCEDURES IS INEFFECTIVE OR IMPRACTICAL.: Brand: FLOSEAL HEMOSTATIC MATRIX

## (undated) DEVICE — CHOLE CATH KIT ARROW

## (undated) DEVICE — ENDOPATH XCEL UNIVERSAL TROCAR STABLILITY SLEEVES: Brand: ENDOPATH XCEL

## (undated) DEVICE — GAUZE SPONGES,16 PLY: Brand: CURITY

## (undated) DEVICE — HYDROPHILIC WOUND DRESSING WITH ZINC PLUS VITAMINS A AND B6.: Brand: DERMAGRAN®-B

## (undated) DEVICE — LARGE, DISPOSABLE ALEXIS O C-SECTION PROTECTOR - RETRACTOR: Brand: ALEXIS ® O C-SECTION PROTECTOR - RETRACTOR

## (undated) DEVICE — SYRINGE 10ML LL